# Patient Record
Sex: MALE | Race: WHITE | Employment: FULL TIME | URBAN - METROPOLITAN AREA
[De-identification: names, ages, dates, MRNs, and addresses within clinical notes are randomized per-mention and may not be internally consistent; named-entity substitution may affect disease eponyms.]

---

## 2017-01-06 RX ORDER — AMOXICILLIN 500 MG/1
CAPSULE ORAL
Qty: 30 CAPSULE | Refills: 0 | Status: SHIPPED | OUTPATIENT
Start: 2017-01-06 | End: 2017-12-05

## 2017-01-10 ENCOUNTER — TELEPHONE (OUTPATIENT)
Dept: INTERNAL MEDICINE CLINIC | Age: 52
End: 2017-01-10

## 2017-04-10 ENCOUNTER — TELEPHONE (OUTPATIENT)
Dept: INTERNAL MEDICINE CLINIC | Age: 52
End: 2017-04-10

## 2017-04-23 DIAGNOSIS — F32.0 MILD SINGLE CURRENT EPISODE OF MAJOR DEPRESSIVE DISORDER (HCC): ICD-10-CM

## 2017-07-11 DIAGNOSIS — E78.00 PURE HYPERCHOLESTEROLEMIA: ICD-10-CM

## 2017-07-13 RX ORDER — ATORVASTATIN CALCIUM 20 MG/1
TABLET, FILM COATED ORAL
Qty: 30 TABLET | Refills: 2 | Status: SHIPPED | OUTPATIENT
Start: 2017-07-13 | End: 2017-11-09 | Stop reason: SDUPTHER

## 2017-08-11 RX ORDER — IBUPROFEN 600 MG/1
TABLET ORAL
Qty: 90 TABLET | Refills: 2 | Status: SHIPPED | OUTPATIENT
Start: 2017-08-11 | End: 2017-12-05 | Stop reason: SDUPTHER

## 2017-11-09 DIAGNOSIS — E78.00 PURE HYPERCHOLESTEROLEMIA: ICD-10-CM

## 2017-11-09 RX ORDER — ATORVASTATIN CALCIUM 20 MG/1
TABLET, FILM COATED ORAL
Qty: 30 TABLET | Refills: 1 | Status: SHIPPED | OUTPATIENT
Start: 2017-11-09 | End: 2017-12-05 | Stop reason: SDUPTHER

## 2017-12-05 ENCOUNTER — OFFICE VISIT (OUTPATIENT)
Dept: INTERNAL MEDICINE CLINIC | Age: 52
End: 2017-12-05

## 2017-12-05 VITALS
HEART RATE: 80 BPM | SYSTOLIC BLOOD PRESSURE: 124 MMHG | HEIGHT: 67 IN | BODY MASS INDEX: 33.51 KG/M2 | DIASTOLIC BLOOD PRESSURE: 80 MMHG | TEMPERATURE: 98.1 F | WEIGHT: 213.5 LBS

## 2017-12-05 DIAGNOSIS — F32.0 MILD SINGLE CURRENT EPISODE OF MAJOR DEPRESSIVE DISORDER (HCC): ICD-10-CM

## 2017-12-05 DIAGNOSIS — Z12.11 COLON CANCER SCREENING: ICD-10-CM

## 2017-12-05 DIAGNOSIS — Z00.00 ROUTINE GENERAL MEDICAL EXAMINATION AT A HEALTH CARE FACILITY: Primary | ICD-10-CM

## 2017-12-05 DIAGNOSIS — E78.00 PURE HYPERCHOLESTEROLEMIA: ICD-10-CM

## 2017-12-05 DIAGNOSIS — Z12.5 PROSTATE CANCER SCREENING: ICD-10-CM

## 2017-12-05 DIAGNOSIS — F41.9 ANXIETY: ICD-10-CM

## 2017-12-05 LAB
A/G RATIO: 1.7 (ref 1.1–2.2)
ALBUMIN SERPL-MCNC: 4.7 G/DL (ref 3.4–5)
ALP BLD-CCNC: 112 U/L (ref 40–129)
ALT SERPL-CCNC: 28 U/L (ref 10–40)
ANION GAP SERPL CALCULATED.3IONS-SCNC: 16 MMOL/L (ref 3–16)
AST SERPL-CCNC: 22 U/L (ref 15–37)
BILIRUB SERPL-MCNC: 0.5 MG/DL (ref 0–1)
BILIRUBIN, POC: NORMAL
BLOOD URINE, POC: NORMAL
BUN BLDV-MCNC: 19 MG/DL (ref 7–20)
CALCIUM SERPL-MCNC: 9.7 MG/DL (ref 8.3–10.6)
CHLORIDE BLD-SCNC: 102 MMOL/L (ref 99–110)
CHOLESTEROL, TOTAL: 234 MG/DL (ref 0–199)
CLARITY, POC: CLEAR
CO2: 23 MMOL/L (ref 21–32)
COLOR, POC: NORMAL
CREAT SERPL-MCNC: 0.9 MG/DL (ref 0.9–1.3)
GFR AFRICAN AMERICAN: >60
GFR NON-AFRICAN AMERICAN: >60
GLOBULIN: 2.8 G/DL
GLUCOSE BLD-MCNC: 86 MG/DL (ref 70–99)
GLUCOSE URINE, POC: NORMAL
HCT VFR BLD CALC: 41.4 % (ref 40.5–52.5)
HDLC SERPL-MCNC: 64 MG/DL (ref 40–60)
HEMOGLOBIN: 14.1 G/DL (ref 13.5–17.5)
KETONES, POC: NORMAL
LDL CHOLESTEROL CALCULATED: 123 MG/DL
LEUKOCYTE EST, POC: NORMAL
MCH RBC QN AUTO: 31.7 PG (ref 26–34)
MCHC RBC AUTO-ENTMCNC: 34 G/DL (ref 31–36)
MCV RBC AUTO: 93.2 FL (ref 80–100)
NITRITE, POC: NORMAL
PDW BLD-RTO: 14.2 % (ref 12.4–15.4)
PH, POC: 6
PLATELET # BLD: 272 K/UL (ref 135–450)
PMV BLD AUTO: 7.8 FL (ref 5–10.5)
POTASSIUM SERPL-SCNC: 5.1 MMOL/L (ref 3.5–5.1)
PROSTATE SPECIFIC ANTIGEN: 0.33 NG/ML (ref 0–4)
PROTEIN, POC: NORMAL
RBC # BLD: 4.45 M/UL (ref 4.2–5.9)
SODIUM BLD-SCNC: 141 MMOL/L (ref 136–145)
SPECIFIC GRAVITY, POC: 1.02
TOTAL PROTEIN: 7.5 G/DL (ref 6.4–8.2)
TRIGL SERPL-MCNC: 236 MG/DL (ref 0–150)
TSH SERPL DL<=0.05 MIU/L-ACNC: 0.94 UIU/ML (ref 0.27–4.2)
UROBILINOGEN, POC: 0.2
VLDLC SERPL CALC-MCNC: 47 MG/DL
WBC # BLD: 7.1 K/UL (ref 4–11)

## 2017-12-05 PROCEDURE — 81002 URINALYSIS NONAUTO W/O SCOPE: CPT | Performed by: INTERNAL MEDICINE

## 2017-12-05 PROCEDURE — 99396 PREV VISIT EST AGE 40-64: CPT | Performed by: INTERNAL MEDICINE

## 2017-12-05 PROCEDURE — 36415 COLL VENOUS BLD VENIPUNCTURE: CPT | Performed by: INTERNAL MEDICINE

## 2017-12-05 RX ORDER — ALPRAZOLAM 0.5 MG/1
0.5 TABLET ORAL NIGHTLY PRN
Qty: 28 TABLET | Refills: 2 | Status: SHIPPED | OUTPATIENT
Start: 2017-12-05 | End: 2018-03-06 | Stop reason: SDUPTHER

## 2017-12-05 RX ORDER — OMEPRAZOLE 40 MG/1
40 CAPSULE, DELAYED RELEASE ORAL
Qty: 90 CAPSULE | Refills: 3 | Status: SHIPPED | OUTPATIENT
Start: 2017-12-05 | End: 2018-12-07

## 2017-12-05 RX ORDER — SERTRALINE HYDROCHLORIDE 100 MG/1
100 TABLET, FILM COATED ORAL DAILY
Qty: 90 TABLET | Refills: 3 | Status: SHIPPED | OUTPATIENT
Start: 2017-12-05 | End: 2018-03-06

## 2017-12-05 RX ORDER — ATORVASTATIN CALCIUM 20 MG/1
20 TABLET, FILM COATED ORAL DAILY
Qty: 90 TABLET | Refills: 3 | Status: SHIPPED | OUTPATIENT
Start: 2017-12-05 | End: 2018-08-31 | Stop reason: SDUPTHER

## 2017-12-05 RX ORDER — IBUPROFEN 600 MG/1
600 TABLET ORAL EVERY 8 HOURS PRN
Qty: 180 TABLET | Refills: 3 | Status: SHIPPED | OUTPATIENT
Start: 2017-12-05 | End: 2018-12-31 | Stop reason: SDUPTHER

## 2017-12-05 ASSESSMENT — PATIENT HEALTH QUESTIONNAIRE - PHQ9
SUM OF ALL RESPONSES TO PHQ QUESTIONS 1-9: 0
1. LITTLE INTEREST OR PLEASURE IN DOING THINGS: 0
SUM OF ALL RESPONSES TO PHQ9 QUESTIONS 1 & 2: 0
2. FEELING DOWN, DEPRESSED OR HOPELESS: 0

## 2017-12-05 ASSESSMENT — ENCOUNTER SYMPTOMS
ABDOMINAL PAIN: 1
RESPIRATORY NEGATIVE: 1
SHORTNESS OF BREATH: 0
CONSTIPATION: 0

## 2017-12-05 NOTE — PROGRESS NOTES
episode of major depressive disorder (HCC)    Anxiety  -     ALPRAZolam (XANAX) 0.5 MG tablet; Take 1 tablet by mouth nightly as needed for Sleep . Prostate cancer screening  -     POCT Urinalysis no Micro  -     Psa screening    Colon cancer screening  -     POCT Fecal Immunochemical Test (FIT); Future    Other orders  -     ibuprofen (ADVIL;MOTRIN) 600 MG tablet; Take 1 tablet by mouth every 8 hours as needed for Pain  -     omeprazole (PRILOSEC) 40 MG delayed release capsule; Take 1 capsule by mouth daily (with breakfast)  -     sertraline (ZOLOFT) 100 MG tablet; Take 1 tablet by mouth daily    Controlled Substances Monitoring:     Attestation: The Prescription Monitoring Report for this patient was reviewed today. Ann-Marie Oscar MD)  Documentation: No signs of potential drug abuse or diversion identified.  Ann-Marie Oscar MD)

## 2017-12-18 ENCOUNTER — TELEPHONE (OUTPATIENT)
Dept: INTERNAL MEDICINE CLINIC | Age: 52
End: 2017-12-18

## 2017-12-18 RX ORDER — AMOXICILLIN 250 MG/1
250 CAPSULE ORAL 3 TIMES DAILY
Qty: 30 CAPSULE | Refills: 0 | Status: SHIPPED | OUTPATIENT
Start: 2017-12-18 | End: 2017-12-28

## 2018-03-06 ENCOUNTER — OFFICE VISIT (OUTPATIENT)
Dept: INTERNAL MEDICINE CLINIC | Age: 53
End: 2018-03-06

## 2018-03-06 VITALS
WEIGHT: 219.5 LBS | HEART RATE: 73 BPM | SYSTOLIC BLOOD PRESSURE: 136 MMHG | OXYGEN SATURATION: 97 % | DIASTOLIC BLOOD PRESSURE: 82 MMHG | TEMPERATURE: 98.7 F | BODY MASS INDEX: 34.38 KG/M2

## 2018-03-06 DIAGNOSIS — Z72.0 TOBACCO ABUSE: ICD-10-CM

## 2018-03-06 DIAGNOSIS — M15.3 OTHER SECONDARY OSTEOARTHRITIS OF MULTIPLE SITES: ICD-10-CM

## 2018-03-06 DIAGNOSIS — F41.9 ANXIETY: ICD-10-CM

## 2018-03-06 DIAGNOSIS — F32.0 MILD SINGLE CURRENT EPISODE OF MAJOR DEPRESSIVE DISORDER (HCC): Primary | ICD-10-CM

## 2018-03-06 DIAGNOSIS — E78.00 PURE HYPERCHOLESTEROLEMIA: ICD-10-CM

## 2018-03-06 PROCEDURE — 99214 OFFICE O/P EST MOD 30 MIN: CPT | Performed by: INTERNAL MEDICINE

## 2018-03-06 RX ORDER — ALPRAZOLAM 0.5 MG/1
0.5 TABLET ORAL NIGHTLY PRN
Qty: 28 TABLET | Refills: 2 | Status: SHIPPED | OUTPATIENT
Start: 2018-03-06 | End: 2018-08-31 | Stop reason: SDUPTHER

## 2018-03-06 RX ORDER — BUPROPION HYDROCHLORIDE 100 MG/1
100 TABLET, EXTENDED RELEASE ORAL 2 TIMES DAILY
Qty: 180 TABLET | Refills: 3 | Status: SHIPPED | OUTPATIENT
Start: 2018-03-06 | End: 2018-08-31 | Stop reason: SDUPTHER

## 2018-03-06 ASSESSMENT — ENCOUNTER SYMPTOMS: RESPIRATORY NEGATIVE: 1

## 2018-03-06 NOTE — PROGRESS NOTES
Negative. Cardiovascular: Negative. Gastrointestinal:        Sx of IBS. Genitourinary: Negative. Musculoskeletal: Positive for arthralgias. Both knees. Skin: Negative. Neurological: Negative. Psychiatric/Behavioral: Positive for dysphoric mood and sleep disturbance. The patient is nervous/anxious. Objective:   Physical Exam   Constitutional: He appears well-developed and well-nourished. HENT:   Mouth/Throat: No oropharyngeal exudate. Eyes: Conjunctivae and EOM are normal. Pupils are equal, round, and reactive to light. No scleral icterus. Neck: Normal range of motion. No thyromegaly present. Cardiovascular: Normal rate, regular rhythm and intact distal pulses. Pulmonary/Chest: Effort normal. He has no wheezes. He exhibits no tenderness. Abdominal: Soft. He exhibits no mass. There is no tenderness. Musculoskeletal: Normal range of motion. He exhibits no edema. Lymphadenopathy:     He has no cervical adenopathy. Neurological: He is alert. No cranial nerve deficit. Skin: Skin is warm. Psychiatric: His behavior is normal.       Assessment:      Encounter Diagnoses   Name Primary?  Anxiety     Mild single current episode of major depressive disorder ([address] Yes    Other secondary osteoarthritis of multiple sites     Tobacco abuse     Pure hypercholesterolemia            Plan:      Holly Barragan was seen today for medication refill. Diagnoses and all orders for this visit:    Mild single current episode of major depressive disorder ([address]    Anxiety    Other secondary osteoarthritis of multiple sites    Tobacco abuse    Pure hypercholesterolemia            Controlled Substances Monitoring: The Prescription Monitoring Report for this patient was reviewed today. Dominic Conti MD)    No signs of potential drug abuse or diversion identified. , Possible medication side effects, risk of tolerance/dependence & alternative treatments discussed.  Dominic Conti MD)

## 2018-04-20 ENCOUNTER — OFFICE VISIT (OUTPATIENT)
Dept: INTERNAL MEDICINE CLINIC | Age: 53
End: 2018-04-20

## 2018-04-20 VITALS
OXYGEN SATURATION: 98 % | WEIGHT: 215 LBS | DIASTOLIC BLOOD PRESSURE: 94 MMHG | TEMPERATURE: 98.7 F | SYSTOLIC BLOOD PRESSURE: 156 MMHG | BODY MASS INDEX: 33.67 KG/M2 | HEART RATE: 80 BPM

## 2018-04-20 DIAGNOSIS — K04.7 ABSCESSED TOOTH: Primary | ICD-10-CM

## 2018-04-20 PROCEDURE — 99214 OFFICE O/P EST MOD 30 MIN: CPT | Performed by: INTERNAL MEDICINE

## 2018-04-20 RX ORDER — HYDROCODONE BITARTRATE AND ACETAMINOPHEN 5; 325 MG/1; MG/1
1 TABLET ORAL EVERY 8 HOURS PRN
Qty: 10 TABLET | Refills: 0 | Status: SHIPPED | OUTPATIENT
Start: 2018-04-20 | End: 2018-04-25

## 2018-04-20 RX ORDER — AMOXICILLIN 500 MG/1
500 CAPSULE ORAL 3 TIMES DAILY
Qty: 30 CAPSULE | Refills: 0 | Status: SHIPPED | OUTPATIENT
Start: 2018-04-20 | End: 2018-04-30

## 2018-04-20 ASSESSMENT — ENCOUNTER SYMPTOMS
RESPIRATORY NEGATIVE: 1
GASTROINTESTINAL NEGATIVE: 1

## 2018-04-23 ENCOUNTER — TELEPHONE (OUTPATIENT)
Dept: INTERNAL MEDICINE CLINIC | Age: 53
End: 2018-04-23

## 2018-05-02 ENCOUNTER — TELEPHONE (OUTPATIENT)
Dept: INTERNAL MEDICINE CLINIC | Age: 53
End: 2018-05-02

## 2018-08-31 ENCOUNTER — HOSPITAL ENCOUNTER (OUTPATIENT)
Dept: NON INVASIVE DIAGNOSTICS | Age: 53
Discharge: HOME | End: 2018-08-31
Attending: INTERNAL MEDICINE | Admitting: INTERNAL MEDICINE

## 2018-08-31 ENCOUNTER — OFFICE VISIT (OUTPATIENT)
Dept: INTERNAL MEDICINE CLINIC | Age: 53
End: 2018-08-31

## 2018-08-31 VITALS
BODY MASS INDEX: 32.79 KG/M2 | TEMPERATURE: 98.4 F | WEIGHT: 209.38 LBS | DIASTOLIC BLOOD PRESSURE: 82 MMHG | SYSTOLIC BLOOD PRESSURE: 134 MMHG | HEART RATE: 86 BPM | OXYGEN SATURATION: 97 %

## 2018-08-31 DIAGNOSIS — G89.29 CHRONIC PAIN OF RIGHT ANKLE: Primary | ICD-10-CM

## 2018-08-31 DIAGNOSIS — M25.571 CHRONIC PAIN OF RIGHT ANKLE: Primary | ICD-10-CM

## 2018-08-31 DIAGNOSIS — M25.561 CHRONIC PAIN OF RIGHT KNEE: ICD-10-CM

## 2018-08-31 DIAGNOSIS — M25.571 CHRONIC PAIN OF RIGHT ANKLE: ICD-10-CM

## 2018-08-31 DIAGNOSIS — E78.00 PURE HYPERCHOLESTEROLEMIA: ICD-10-CM

## 2018-08-31 DIAGNOSIS — F41.9 ANXIETY: ICD-10-CM

## 2018-08-31 DIAGNOSIS — G89.29 CHRONIC PAIN OF RIGHT ANKLE: ICD-10-CM

## 2018-08-31 DIAGNOSIS — G89.29 CHRONIC PAIN OF RIGHT KNEE: ICD-10-CM

## 2018-08-31 PROCEDURE — 99214 OFFICE O/P EST MOD 30 MIN: CPT | Performed by: INTERNAL MEDICINE

## 2018-08-31 RX ORDER — BUPROPION HYDROCHLORIDE 100 MG/1
100 TABLET, EXTENDED RELEASE ORAL 2 TIMES DAILY
Qty: 180 TABLET | Refills: 3 | Status: SHIPPED | OUTPATIENT
Start: 2018-08-31 | End: 2018-12-07

## 2018-08-31 RX ORDER — ATORVASTATIN CALCIUM 20 MG/1
20 TABLET, FILM COATED ORAL DAILY
Qty: 90 TABLET | Refills: 3 | Status: SHIPPED | OUTPATIENT
Start: 2018-08-31 | End: 2019-12-30

## 2018-08-31 RX ORDER — ALPRAZOLAM 0.5 MG/1
0.5 TABLET ORAL NIGHTLY PRN
Qty: 28 TABLET | Refills: 2 | Status: SHIPPED | OUTPATIENT
Start: 2018-08-31 | End: 2018-12-07 | Stop reason: SDUPTHER

## 2018-08-31 ASSESSMENT — ENCOUNTER SYMPTOMS
CHOKING: 0
RESPIRATORY NEGATIVE: 1
COLOR CHANGE: 0
APNEA: 0
CONSTIPATION: 0
EYES NEGATIVE: 1
SINUS PRESSURE: 0
STRIDOR: 0
BACK PAIN: 0

## 2018-08-31 NOTE — LETTER
[address] [facility]  [address] [address]  Phone: [phone]  Fax: [phone]    Rahat Moncada MD        August 31, 2018     Patient: Corina Cassidy   YOB: 1965   Date of Visit: 8/31/2018       To Whom It May Concern: It is my medical opinion that Leona Mora requires a disability parking placard for the following reasons:  He cannot walk 200 feet without stopping to rest.  Duration of need: 1 year    If you have any questions or concerns, please don't hesitate to call.     Sincerely,          Rahat Moncada MD

## 2018-08-31 NOTE — PROGRESS NOTES
Subjective:      Patient ID: Dru Gerard is a 45 y.o. male. Patient presents with: Foot Pain: right foot pain. has to get note to be off work until 9-17, he needs to heal his foot before his new job interview. Skin Lesion: wants a growth removed off right hand. Letter for School/Work: needs new letter for handicapped placard. Medication Refill: xanax  Dru Gerard is a 45 y.o. male with the following history as recorded in [facility]: There are no active problems to display for this patient. Current Outpatient Prescriptions:  buPROPion (WELLBUTRIN SR) 100 MG extended release tablet, Take 1 tablet by mouth 2 times daily, Disp: 180 tablet, Rfl: 3  atorvastatin (LIPITOR) 20 MG tablet, Take 1 tablet by mouth daily, Disp: 90 tablet, Rfl: 3  ALPRAZolam (XANAX) 0.5 MG tablet, Take 1 tablet by mouth nightly as needed for Sleep for up to 30 days. ., Disp: 28 tablet, Rfl: 2  ibuprofen (ADVIL;MOTRIN) 600 MG tablet, Take 1 tablet by mouth every 8 hours as needed for Pain, Disp: 180 tablet, Rfl: 3  omeprazole (PRILOSEC) 40 MG delayed release capsule, Take 1 capsule by mouth daily (with breakfast), Disp: 90 capsule, Rfl: 3  Misc Natural Products (GLUCOS-CHONDROIT-MSM COMPLEX PO), Take by mouth daily, Disp: , Rfl:   aspirin 325 MG EC tablet, Take 325 mg by mouth every other day , Disp: , Rfl:     No current facility-administered medications for this visit. Allergies: Patient has no known allergies. Past Medical History:  2003: Torn ACL      Comment: left, right  No date:  Torn meniscus      Comment: left, right  Past Surgical History:  No date: DENTAL SURGERY      Comment: tooth extraction  No date: KNEE ARTHROSCOPY Left  Review of patient's family history indicates:  Problem: Heart Disease      Relation: Mother       Age of Onset: (Not Specified)     Smoking status: Former Smoker                                                              Packs/day: 0.50      Years: 30.00        Quit date: 11/5/2017  Smokeless and deformity. Feet:    Lymphadenopathy:     He has no cervical adenopathy. Neurological: He is alert. No cranial nerve deficit. Skin: Skin is warm. Psychiatric: His behavior is normal.       Assessment:      Encounter Diagnoses   Name Primary?  Chronic pain of right ankle Yes    Pure hypercholesterolemia     Anxiety     Chronic pain of right knee            Plan:      Rober Bautista was seen today for foot pain, skin lesion, letter for school/work and medication refill. Diagnoses and all orders for this visit:    Chronic pain of right ankle  -     XR ANKLE LEFT (2 VIEWS)  -     External Referral To Orthopedic Surgery    Pure hypercholesterolemia  -     atorvastatin (LIPITOR) 20 MG tablet; Take 1 tablet by mouth daily    Anxiety  -     ALPRAZolam (XANAX) 0.5 MG tablet; Take 1 tablet by mouth nightly as needed for Sleep for up to 30 days. .    Chronic pain of right knee    Other orders  -     buPROPion (WELLBUTRIN SR) 100 MG extended release tablet; Take 1 tablet by mouth 2 times daily      Controlled Substances Monitoring:     RX Monitoring 8/31/2018   Attestation The Prescription Monitoring Report for this patient was reviewed today. Documentation Possible medication side effects, risk of tolerance/dependence & alternative treatments discussed. ;No signs of potential drug abuse or diversion identified. Rt ankle had injury many years ago. Put weight on rt ankle because of deformed lt knee. Referred to orthopedics for rt ankle pain. He may come for wart remove on his hand.    Ana Paula Harding MD

## 2018-12-07 ENCOUNTER — OFFICE VISIT (OUTPATIENT)
Dept: INTERNAL MEDICINE CLINIC | Age: 53
End: 2018-12-07
Payer: COMMERCIAL

## 2018-12-07 VITALS
SYSTOLIC BLOOD PRESSURE: 120 MMHG | OXYGEN SATURATION: 94 % | BODY MASS INDEX: 33.74 KG/M2 | DIASTOLIC BLOOD PRESSURE: 76 MMHG | HEIGHT: 67 IN | WEIGHT: 215 LBS | HEART RATE: 63 BPM | TEMPERATURE: 98.3 F

## 2018-12-07 DIAGNOSIS — E78.00 PURE HYPERCHOLESTEROLEMIA: ICD-10-CM

## 2018-12-07 DIAGNOSIS — Z00.00 ROUTINE GENERAL MEDICAL EXAMINATION AT A HEALTH CARE FACILITY: Primary | ICD-10-CM

## 2018-12-07 DIAGNOSIS — F41.9 ANXIETY: ICD-10-CM

## 2018-12-07 LAB
BILIRUBIN, POC: NORMAL
BLOOD URINE, POC: NORMAL
CLARITY, POC: CLEAR
COLOR, POC: NORMAL
GLUCOSE URINE, POC: NORMAL
KETONES, POC: NORMAL
LEUKOCYTE EST, POC: NORMAL
NITRITE, POC: NORMAL
PH, POC: 6
PROTEIN, POC: NORMAL
SPECIFIC GRAVITY, POC: 1.01
UROBILINOGEN, POC: 0.2

## 2018-12-07 PROCEDURE — 81002 URINALYSIS NONAUTO W/O SCOPE: CPT | Performed by: INTERNAL MEDICINE

## 2018-12-07 PROCEDURE — 36415 COLL VENOUS BLD VENIPUNCTURE: CPT | Performed by: INTERNAL MEDICINE

## 2018-12-07 PROCEDURE — 99396 PREV VISIT EST AGE 40-64: CPT | Performed by: INTERNAL MEDICINE

## 2018-12-07 RX ORDER — ALPRAZOLAM 0.5 MG/1
0.5 TABLET ORAL NIGHTLY PRN
Qty: 28 TABLET | Refills: 2 | Status: SHIPPED | OUTPATIENT
Start: 2018-12-07 | End: 2021-02-22 | Stop reason: SDUPTHER

## 2018-12-07 RX ORDER — BUPROPION HYDROCHLORIDE 150 MG/1
150 TABLET, EXTENDED RELEASE ORAL 2 TIMES DAILY
Qty: 180 TABLET | Refills: 3 | Status: SHIPPED | OUTPATIENT
Start: 2018-12-07 | End: 2021-02-22 | Stop reason: SDUPTHER

## 2018-12-07 ASSESSMENT — ENCOUNTER SYMPTOMS
EYES NEGATIVE: 1
SHORTNESS OF BREATH: 0
RESPIRATORY NEGATIVE: 1

## 2018-12-07 NOTE — PROGRESS NOTES
Comment: down to 4-5 cigs per day  Alcohol use: Yes              Comment: social drink. Hyperlipidemia   This is a chronic problem. The current episode started more than 1 year ago. The problem is uncontrolled. Recent lipid tests were reviewed and are variable. There are no known factors aggravating his hyperlipidemia. Pertinent negatives include no chest pain, leg pain, myalgias or shortness of breath. Current antihyperlipidemic treatment includes statins. The current treatment provides significant improvement of lipids. Compliance problems: missed med often. Risk factors for coronary artery disease include dyslipidemia, male sex and stress. Review of Systems   Constitutional: Negative for fatigue. HENT: Negative. Eyes: Negative. Respiratory: Negative. Negative for shortness of breath. Tobbaco abuse. Cardiovascular: Negative. Negative for chest pain. Genitourinary: Negative. Musculoskeletal: Positive for arthralgias. Negative for myalgias. Knees and ankles. Skin: Negative. Neurological: Positive for headaches. Psychiatric/Behavioral: Positive for dysphoric mood. The patient is nervous/anxious. Objective:   Physical Exam   Constitutional: He appears well-developed and well-nourished. HENT:   Head: Normocephalic and atraumatic. Right Ear: External ear normal.   Left Ear: External ear normal.   Nose: Nose normal.   Mouth/Throat: Oropharynx is clear and moist.   Eyes: Conjunctivae are normal. Right eye exhibits no discharge. Left eye exhibits no discharge. No scleral icterus. Neck: Normal range of motion. Neck supple. No JVD present. No tracheal deviation present. No thyromegaly present. Cardiovascular: Normal rate, regular rhythm, normal heart sounds and intact distal pulses. Exam reveals no gallop and no friction rub. No murmur heard. Pulmonary/Chest: Effort normal and breath sounds normal. No stridor. No respiratory distress.  He has no

## 2018-12-08 LAB
A/G RATIO: 1.8 (ref 1.1–2.2)
ALBUMIN SERPL-MCNC: 4.8 G/DL (ref 3.4–5)
ALP BLD-CCNC: 118 U/L (ref 40–129)
ALT SERPL-CCNC: 34 U/L (ref 10–40)
AMPHETAMINE SCREEN, URINE: NORMAL
ANION GAP SERPL CALCULATED.3IONS-SCNC: 15 MMOL/L (ref 3–16)
AST SERPL-CCNC: 21 U/L (ref 15–37)
BARBITURATE SCREEN URINE: NORMAL
BENZODIAZEPINE SCREEN, URINE: NORMAL
BILIRUB SERPL-MCNC: 0.6 MG/DL (ref 0–1)
BUN BLDV-MCNC: 13 MG/DL (ref 7–20)
CALCIUM SERPL-MCNC: 10 MG/DL (ref 8.3–10.6)
CANNABINOID SCREEN URINE: NORMAL
CHLORIDE BLD-SCNC: 103 MMOL/L (ref 99–110)
CHOLESTEROL, TOTAL: 213 MG/DL (ref 0–199)
CO2: 27 MMOL/L (ref 21–32)
COCAINE METABOLITE SCREEN URINE: NORMAL
CREAT SERPL-MCNC: 0.8 MG/DL (ref 0.9–1.3)
GFR AFRICAN AMERICAN: >60
GFR NON-AFRICAN AMERICAN: >60
GLOBULIN: 2.6 G/DL
GLUCOSE BLD-MCNC: 79 MG/DL (ref 70–99)
HCT VFR BLD CALC: 44 % (ref 40.5–52.5)
HDLC SERPL-MCNC: 61 MG/DL (ref 40–60)
HEMOGLOBIN: 14.6 G/DL (ref 13.5–17.5)
LDL CHOLESTEROL CALCULATED: 102 MG/DL
Lab: NORMAL
MCH RBC QN AUTO: 31.3 PG (ref 26–34)
MCHC RBC AUTO-ENTMCNC: 33.2 G/DL (ref 31–36)
MCV RBC AUTO: 94.3 FL (ref 80–100)
METHADONE SCREEN, URINE: NORMAL
OPIATE SCREEN URINE: NORMAL
OXYCODONE URINE: NORMAL
PDW BLD-RTO: 14.9 % (ref 12.4–15.4)
PH UA: 6
PHENCYCLIDINE SCREEN URINE: NORMAL
PLATELET # BLD: 246 K/UL (ref 135–450)
PMV BLD AUTO: 8.3 FL (ref 5–10.5)
POTASSIUM SERPL-SCNC: 4.3 MMOL/L (ref 3.5–5.1)
PROPOXYPHENE SCREEN: NORMAL
RBC # BLD: 4.67 M/UL (ref 4.2–5.9)
SODIUM BLD-SCNC: 145 MMOL/L (ref 136–145)
TOTAL PROTEIN: 7.4 G/DL (ref 6.4–8.2)
TRIGL SERPL-MCNC: 252 MG/DL (ref 0–150)
TSH SERPL DL<=0.05 MIU/L-ACNC: 0.89 UIU/ML (ref 0.27–4.2)
VLDLC SERPL CALC-MCNC: 50 MG/DL
WBC # BLD: 6.9 K/UL (ref 4–11)

## 2019-01-03 RX ORDER — IBUPROFEN 600 MG/1
TABLET ORAL
Qty: 90 TABLET | Refills: 2 | Status: SHIPPED | OUTPATIENT
Start: 2019-01-03 | End: 2021-02-22

## 2019-09-16 ENCOUNTER — HOSPITAL ENCOUNTER (EMERGENCY)
Age: 54
Discharge: HOME | End: 2019-09-16
Attending: EMERGENCY MEDICINE
Payer: COMMERCIAL

## 2019-09-16 ENCOUNTER — APPOINTMENT (OUTPATIENT)
Dept: GENERAL RADIOLOGY | Age: 54
End: 2019-09-16
Payer: COMMERCIAL

## 2019-09-16 VITALS
WEIGHT: 208 LBS | BODY MASS INDEX: 32.65 KG/M2 | HEART RATE: 66 BPM | SYSTOLIC BLOOD PRESSURE: 173 MMHG | TEMPERATURE: 98 F | HEIGHT: 67 IN | RESPIRATION RATE: 16 BRPM | OXYGEN SATURATION: 98 % | DIASTOLIC BLOOD PRESSURE: 105 MMHG

## 2019-09-16 DIAGNOSIS — S60.221A CONTUSION OF RIGHT HAND, INITIAL ENCOUNTER: Primary | ICD-10-CM

## 2019-09-16 PROCEDURE — 73130 X-RAY EXAM OF HAND: CPT

## 2019-09-16 PROCEDURE — 99283 EMERGENCY DEPT VISIT LOW MDM: CPT

## 2019-09-16 ASSESSMENT — PAIN DESCRIPTION - FREQUENCY: FREQUENCY: CONTINUOUS

## 2019-09-16 ASSESSMENT — PAIN DESCRIPTION - DESCRIPTORS: DESCRIPTORS: SORE

## 2019-09-16 ASSESSMENT — PAIN DESCRIPTION - ORIENTATION: ORIENTATION: RIGHT

## 2019-09-16 ASSESSMENT — PAIN SCALES - GENERAL
PAINLEVEL_OUTOF10: 8
PAINLEVEL_OUTOF10: 8

## 2019-09-16 ASSESSMENT — PAIN - FUNCTIONAL ASSESSMENT: PAIN_FUNCTIONAL_ASSESSMENT: PREVENTS OR INTERFERES SOME ACTIVE ACTIVITIES AND ADLS

## 2019-09-16 ASSESSMENT — PAIN DESCRIPTION - PAIN TYPE: TYPE: ACUTE PAIN

## 2019-09-16 ASSESSMENT — PAIN DESCRIPTION - PROGRESSION: CLINICAL_PROGRESSION: NOT CHANGED

## 2019-09-16 ASSESSMENT — PAIN DESCRIPTION - LOCATION: LOCATION: HAND

## 2019-09-16 NOTE — ED PROVIDER NOTES
Recommend conservative therapy and close clinical follow-up. LABS:  Labs Reviewed - No data to display    All other labs were within normal range or not returned as of this dictation. EMERGENCY DEPARTMENT COURSE and DIFFERENTIAL DIAGNOSIS/MDM:   Vitals:    Vitals:    09/16/19 1949   BP: (!) 173/105   Pulse: 66   Resp: 16   Temp: 98 °F (36.7 °C)   TempSrc: Oral   SpO2: 98%   Weight: 208 lb (94.3 kg)   Height: 5' 7\" (1.702 m)       Patient hit his car door with his hand. He has extensive swelling on the dorsum of his hand. His tenderness is mainly in the area of the distal second third and fourth metacarpals. The x-ray showed some lucencies in the area of the distal fifth metacarpal which were questionable for fracture. I reexamined the patient. I discussed with him the x-ray findings. He says he has no pain at all in that area. He has no tenderness in that area on exam.  I did explain to him that the radiologist felt there was a questionable fracture but in light of the fact that he had no pain or tenderness there were going to manage him with an Ace wrap, ice and elevate. I gave him an orthopedic referral for follow-up for persistent pain. He will use ibuprofen and Tylenol as needed for pain. The x-ray results, treatment plan, and follow-up were discussed with the patient. He understands a follow-up and treatment plan. His blood pressure was elevated. He understands he needs to follow-up to have his blood pressure recheck. PROCEDURES:  None    FINAL IMPRESSION      1.  Contusion of right hand, initial encounter          DISPOSITION/PLAN   DISPOSITION Decision To Discharge 09/16/2019 08:16:36 PM      PATIENT REFERRED TO:  Ariel Albarado MD  [address] [facility]  [address] [place] [address]  [phone]            DISCHARGE MEDICATIONS:  New Prescriptions    No medications on file       (Please note that portions of this note were completed with a voice recognition program. Efforts were made to edit the dictations but occasionally words are mis-transcribed.)    Jolie Young MD  Attending Emergency Physician        Dagmar Porter MD  09/16/19 2019

## 2019-12-29 DIAGNOSIS — E78.00 PURE HYPERCHOLESTEROLEMIA: ICD-10-CM

## 2019-12-30 RX ORDER — ATORVASTATIN CALCIUM 20 MG/1
TABLET, FILM COATED ORAL
Qty: 90 TABLET | Refills: 2 | Status: SHIPPED | OUTPATIENT
Start: 2019-12-30 | End: 2020-11-10

## 2020-11-10 RX ORDER — ATORVASTATIN CALCIUM 20 MG/1
TABLET, FILM COATED ORAL
Qty: 30 TABLET | Refills: 1 | Status: SHIPPED | OUTPATIENT
Start: 2020-11-10 | End: 2021-01-18

## 2021-01-17 DIAGNOSIS — E78.00 PURE HYPERCHOLESTEROLEMIA: ICD-10-CM

## 2021-01-18 RX ORDER — ATORVASTATIN CALCIUM 20 MG/1
TABLET, FILM COATED ORAL
Qty: 30 TABLET | Refills: 0 | Status: SHIPPED | OUTPATIENT
Start: 2021-01-18 | End: 2021-03-05

## 2021-02-22 ENCOUNTER — OFFICE VISIT (OUTPATIENT)
Dept: FAMILY MEDICINE CLINIC | Age: 56
End: 2021-02-22
Payer: COMMERCIAL

## 2021-02-22 VITALS — HEART RATE: 72 BPM | OXYGEN SATURATION: 98 % | WEIGHT: 210 LBS | BODY MASS INDEX: 32.89 KG/M2 | TEMPERATURE: 98 F

## 2021-02-22 DIAGNOSIS — F41.9 ANXIETY: ICD-10-CM

## 2021-02-22 DIAGNOSIS — L98.9 SKIN LESION: Primary | ICD-10-CM

## 2021-02-22 DIAGNOSIS — E78.00 PURE HYPERCHOLESTEROLEMIA: ICD-10-CM

## 2021-02-22 PROCEDURE — G8417 CALC BMI ABV UP PARAM F/U: HCPCS | Performed by: INTERNAL MEDICINE

## 2021-02-22 PROCEDURE — 99213 OFFICE O/P EST LOW 20 MIN: CPT | Performed by: INTERNAL MEDICINE

## 2021-02-22 PROCEDURE — 3017F COLORECTAL CA SCREEN DOC REV: CPT | Performed by: INTERNAL MEDICINE

## 2021-02-22 PROCEDURE — 4004F PT TOBACCO SCREEN RCVD TLK: CPT | Performed by: INTERNAL MEDICINE

## 2021-02-22 PROCEDURE — G8427 DOCREV CUR MEDS BY ELIG CLIN: HCPCS | Performed by: INTERNAL MEDICINE

## 2021-02-22 PROCEDURE — G8484 FLU IMMUNIZE NO ADMIN: HCPCS | Performed by: INTERNAL MEDICINE

## 2021-02-22 RX ORDER — ALPRAZOLAM 0.5 MG/1
0.5 TABLET ORAL NIGHTLY PRN
Qty: 28 TABLET | Refills: 2 | Status: SHIPPED | OUTPATIENT
Start: 2021-02-22 | End: 2021-03-24

## 2021-02-22 RX ORDER — BUPROPION HYDROCHLORIDE 150 MG/1
150 TABLET, EXTENDED RELEASE ORAL 2 TIMES DAILY
Qty: 180 TABLET | Refills: 3 | Status: SHIPPED | OUTPATIENT
Start: 2021-02-22 | End: 2022-04-05 | Stop reason: ALTCHOICE

## 2021-02-22 ASSESSMENT — ENCOUNTER SYMPTOMS
GASTROINTESTINAL NEGATIVE: 1
RESPIRATORY NEGATIVE: 1

## 2021-02-22 NOTE — PROGRESS NOTES
Negative. Respiratory: Negative. Cardiovascular: Negative. Gastrointestinal: Negative. Musculoskeletal: Positive for arthralgias. Knees. Skin:        Moles on his back. Neurological: Negative for dizziness. Psychiatric/Behavioral: The patient is nervous/anxious. Objective:   Physical Exam  Constitutional:       Appearance: He is well-developed. HENT:      Head: Normocephalic and atraumatic. Right Ear: External ear normal.      Left Ear: External ear normal.      Nose: Nose normal.   Eyes:      General: No scleral icterus. Right eye: No discharge. Left eye: No discharge. Conjunctiva/sclera: Conjunctivae normal.   Neck:      Musculoskeletal: Normal range of motion and neck supple. Thyroid: No thyromegaly. Vascular: No JVD. Trachea: No tracheal deviation. Cardiovascular:      Rate and Rhythm: Normal rate and regular rhythm. Heart sounds: Normal heart sounds. No murmur. No friction rub. No gallop. Pulmonary:      Effort: Pulmonary effort is normal. No respiratory distress. Breath sounds: Normal breath sounds. No stridor. No wheezing or rales. Chest:      Chest wall: No tenderness. Abdominal:      General: Bowel sounds are normal. There is no distension. Palpations: Abdomen is soft. There is no mass. Tenderness: There is no abdominal tenderness. There is no guarding or rebound. Genitourinary:     Penis: Normal. No tenderness. Prostate: Normal.      Rectum: Normal. Guaiac result negative. Musculoskeletal:         General: No tenderness. Lymphadenopathy:      Cervical: No cervical adenopathy. Skin:     General: Skin is warm and dry. Coloration: Skin is not pale. Findings: No erythema or rash. Neurological:      Cranial Nerves: No cranial nerve deficit. Motor: No abnormal muscle tone. Coordination: Coordination normal.      Deep Tendon Reflexes: Reflexes are normal and symmetric. Reflexes normal.   Psychiatric:         Behavior: Behavior normal.         Thought Content: Thought content normal.         Judgment: Judgment normal.         Assessment:      Encounter Diagnoses   Name Primary?  Skin lesion Yes    Anxiety     Pure hypercholesterolemia            Plan:      Loli Blue was seen today for medication refill. Diagnoses and all orders for this visit:    Skin lesion  -     External Referral To Dermatology    Anxiety  -     ALPRAZolam (XANAX) 0.5 MG tablet; Take 1 tablet by mouth nightly as needed for Sleep for up to 30 days. Pure hypercholesterolemia    Other orders  -     buPROPion (WELLBUTRIN SR) 150 MG extended release tablet;  Take 1 tablet by mouth 2 times daily              Terrell Diaz MD

## 2021-02-22 NOTE — PATIENT INSTRUCTIONS
Please call your pharmacy if you need any refills of your medication(s). Please call our office at [phone] if you don't hear from us about your test results. Bring an accurate list of your medications with you at every appointment to ensure that we have the correct information.     Our office hours are: Monday - Friday 8:30 am- 5 pm    Phone lines turn on at 8:30 am

## 2021-04-01 ENCOUNTER — OFFICE VISIT (OUTPATIENT)
Dept: FAMILY MEDICINE CLINIC | Age: 56
End: 2021-04-01
Payer: COMMERCIAL

## 2021-04-01 ENCOUNTER — TELEPHONE (OUTPATIENT)
Dept: FAMILY MEDICINE CLINIC | Age: 56
End: 2021-04-01

## 2021-04-01 VITALS
WEIGHT: 201.4 LBS | SYSTOLIC BLOOD PRESSURE: 158 MMHG | OXYGEN SATURATION: 99 % | DIASTOLIC BLOOD PRESSURE: 80 MMHG | HEIGHT: 67 IN | TEMPERATURE: 98.5 F | BODY MASS INDEX: 31.61 KG/M2 | HEART RATE: 66 BPM

## 2021-04-01 DIAGNOSIS — K92.1 MELENA: Primary | ICD-10-CM

## 2021-04-01 DIAGNOSIS — K62.5 BRIGHT RED RECTAL BLEEDING: Primary | ICD-10-CM

## 2021-04-01 DIAGNOSIS — K92.1 MELENA: ICD-10-CM

## 2021-04-01 LAB
A/G RATIO: 1.5 (ref 1.1–2.2)
ALBUMIN SERPL-MCNC: 4.4 G/DL (ref 3.4–5)
ALP BLD-CCNC: 120 U/L (ref 40–129)
ALT SERPL-CCNC: 36 U/L (ref 10–40)
ANION GAP SERPL CALCULATED.3IONS-SCNC: 8 MMOL/L (ref 3–16)
AST SERPL-CCNC: 29 U/L (ref 15–37)
BASOPHILS ABSOLUTE: 0 K/UL (ref 0–0.2)
BASOPHILS RELATIVE PERCENT: 0.5 %
BILIRUB SERPL-MCNC: 0.3 MG/DL (ref 0–1)
BUN BLDV-MCNC: 14 MG/DL (ref 7–20)
CALCIUM SERPL-MCNC: 9 MG/DL (ref 8.3–10.6)
CHLORIDE BLD-SCNC: 104 MMOL/L (ref 99–110)
CO2: 28 MMOL/L (ref 21–32)
CREAT SERPL-MCNC: 0.9 MG/DL (ref 0.9–1.3)
EOSINOPHILS ABSOLUTE: 0.2 K/UL (ref 0–0.6)
EOSINOPHILS RELATIVE PERCENT: 3 %
GFR AFRICAN AMERICAN: >60
GFR NON-AFRICAN AMERICAN: >60
GLOBULIN: 3 G/DL
GLUCOSE BLD-MCNC: 88 MG/DL (ref 70–99)
HCT VFR BLD CALC: 41.4 % (ref 40.5–52.5)
HEMOGLOBIN: 14.2 G/DL (ref 13.5–17.5)
LYMPHOCYTES ABSOLUTE: 1.6 K/UL (ref 1–5.1)
LYMPHOCYTES RELATIVE PERCENT: 24.8 %
MCH RBC QN AUTO: 32 PG (ref 26–34)
MCHC RBC AUTO-ENTMCNC: 34.3 G/DL (ref 31–36)
MCV RBC AUTO: 93.4 FL (ref 80–100)
MONOCYTES ABSOLUTE: 0.6 K/UL (ref 0–1.3)
MONOCYTES RELATIVE PERCENT: 8.9 %
NEUTROPHILS ABSOLUTE: 4.1 K/UL (ref 1.7–7.7)
NEUTROPHILS RELATIVE PERCENT: 62.8 %
PDW BLD-RTO: 14.4 % (ref 12.4–15.4)
PLATELET # BLD: 219 K/UL (ref 135–450)
PMV BLD AUTO: 8.1 FL (ref 5–10.5)
POTASSIUM SERPL-SCNC: 4.2 MMOL/L (ref 3.5–5.1)
RBC # BLD: 4.44 M/UL (ref 4.2–5.9)
SODIUM BLD-SCNC: 140 MMOL/L (ref 136–145)
TOTAL PROTEIN: 7.4 G/DL (ref 6.4–8.2)
WBC # BLD: 6.4 K/UL (ref 4–11)

## 2021-04-01 PROCEDURE — 3017F COLORECTAL CA SCREEN DOC REV: CPT | Performed by: INTERNAL MEDICINE

## 2021-04-01 PROCEDURE — G8427 DOCREV CUR MEDS BY ELIG CLIN: HCPCS | Performed by: INTERNAL MEDICINE

## 2021-04-01 PROCEDURE — 99213 OFFICE O/P EST LOW 20 MIN: CPT | Performed by: INTERNAL MEDICINE

## 2021-04-01 PROCEDURE — G8417 CALC BMI ABV UP PARAM F/U: HCPCS | Performed by: INTERNAL MEDICINE

## 2021-04-01 PROCEDURE — 4004F PT TOBACCO SCREEN RCVD TLK: CPT | Performed by: INTERNAL MEDICINE

## 2021-04-01 RX ORDER — ALPRAZOLAM 0.5 MG/1
0.5 TABLET ORAL NIGHTLY PRN
COMMUNITY
End: 2021-09-29 | Stop reason: SDUPTHER

## 2021-04-01 SDOH — ECONOMIC STABILITY: TRANSPORTATION INSECURITY
IN THE PAST 12 MONTHS, HAS LACK OF TRANSPORTATION KEPT YOU FROM MEETINGS, WORK, OR FROM GETTING THINGS NEEDED FOR DAILY LIVING?: NO

## 2021-04-01 SDOH — ECONOMIC STABILITY: TRANSPORTATION INSECURITY
IN THE PAST 12 MONTHS, HAS THE LACK OF TRANSPORTATION KEPT YOU FROM MEDICAL APPOINTMENTS OR FROM GETTING MEDICATIONS?: NO

## 2021-04-01 SDOH — ECONOMIC STABILITY: FOOD INSECURITY: WITHIN THE PAST 12 MONTHS, THE FOOD YOU BOUGHT JUST DIDN'T LAST AND YOU DIDN'T HAVE MONEY TO GET MORE.: NEVER TRUE

## 2021-04-01 ASSESSMENT — PATIENT HEALTH QUESTIONNAIRE - PHQ9
1. LITTLE INTEREST OR PLEASURE IN DOING THINGS: 0
SUM OF ALL RESPONSES TO PHQ QUESTIONS 1-9: 0

## 2021-04-01 NOTE — PATIENT INSTRUCTIONS
Thank you for choosing [facility]. Please bring a current list of medications to every appointment. Please contact your pharmacy for any prescription refill(s) that you are requesting. Cigarette Smoking and Its Health Risks   GENERAL INFORMATION:   Smoking and your health: Cigarette smoking is the most preventable cause of illness and death in the [place]. A large number of Americans smoke cigarettes, and each year more than one million children and adults start smoking cigarettes. Many people die every year from illnesses caused by smoking. People who smoke die earlier than those who do not smoke. The risk of disease increases if you smoke a lot, inhale deeply, or have smoked many years. Why are cigarettes bad for you? Cigarettes are filled with poison that goes into the lungs when you inhale. Coughing, dizziness, and burning of the eyes, nose, and throat are early signs that smoking is harming you. Smoking increases your health risks if you have diabetes, high blood pressure, or high blood cholesterol. The long-term problems of smoking cigarettes are the following:   Cancer: Smoking increases your chances of getting cancer. Cigarette smoking may play a role in developing many kinds of cancer. Lung cancer is the most common kind of cancer caused by smoking. A smoker is at greater risk of getting cancer of the lips, mouth, throat, or voice box. Smokers also have a higher risk of getting esophagus, stomach, kidney, pancreas, cervix, bladder, and skin cancer. Heart and blood vessel disease: If you already have heart or blood vessel problems and smoke, you are at even greater risk of having continued or worse health problems. The nicotine in the tobacco causes an increase in your heart rate and blood pressure. The arteries (blood vessels) in your arms and legs tighten and narrow because of the nicotine in cigarette smoke.  Cigarette smoke increases blood clotting, and may damage the cigarette. Men may have problems having an erection. Sleeping problems. Smoking is an expensive (costly) habit. You will save money if you choose to stop smoking. Sore throat. Staining of teeth. Women and smoking: You may have a higher risk of having a heart attack or stroke if you smoke and use birth control pills. This risk is more serious if you are 35 years or older. The risk of losing your unborn baby or having a stillborn baby is higher if you are pregnant and smoke. Babies born to smoking mothers often weigh less, and are at a higher risk of sudden infant death syndrome (SIDS). You may have a harder time getting pregnant if you are a smoker. Women who smoke may have a higher risk of osteoporosis (also known as \"brittle bones\"). Women who smoke also have a higher risk of incontinence, which is when you are unable to control when you urinate. Are there risks with smoking cigars or pipes? The risks are the same for people who smoke cigars or pipes as they are for cigarette smokers. There is a risk of getting cancer of the mouth, lip, larynx (voice box), or esophagus if you smoke a cigar or pipe. What are the risks of using snuff or chewing tobacco (\"smokeless tobacco\")? People who use snuff or chewing tobacco have an increased risk of getting mouth or throat cancer. The risk of heart disease, stroke, blood vessel disease and stomach problems is the same as it is for cigarette smokers. What is \"passive smoking\"? Tobacco smoke is dangerous to others. The effect that smoking has on nonsmokers is called \"passive smoking\". Nonsmokers who breathe tobacco smoke have the same health risks as smokers. Children who are around tobacco smoke may have more colds, ear infections, or other breathing problems. Why should I quit smoking? The benefits from quitting smoking happen right away. Your sense of taste and smell will improve. Your body, clothes, car, and home will not smell of tobacco smoke.  Your chance of getting cancer will be reduced as compared to a person who does not quit. As a former smoker, you will live longer than people who continue to smoke. Women who quit smoking before getting pregnant have a better chance of having a healthy baby. You will decrease the health risks of nonsmokers if you stop smoking. By stopping smoking you will also save money. What is the best way to stop smoking? A large percentage of people have tried to quit smoking at least once. Most people who try to quit smoking go through a series of stages. Following are the stages you may go through to stop smoking: Thinking about quitting. Deciding to quit on a certain day. Quitting smoking. Successfully staying an ex-smoker. You must be strong in order to quit smoking. When you decide to quit, you can get help from your caregiver or others. You will learn that there are many ways to stop smoking. Talk to your caregiver about the best method for you when you are ready to quit smoking. Ask your caregiver for more information about how to stop smoking. Call or write the following for more information about the risks of smoking. Smokefree. gov  Phone: 9-808.633.7260  Web Address: www.smokefree. gov  American Lung Association  [address] [facility],[address]. [address]  Phone: 9-1-892.986.5562  Phone: 5-8-011--039-7784  Web Address: [employer] [address] [place] [address]  Phone: 5-473.883.1867  Web Address: http://[employer]/. gov   CARE AGREEMENT:   You have the right to help plan your care. To help with this plan, you must learn about your health condition and how it may be treated. You can then discuss treatment options with your caregivers. Work with them to decide what care may be used to treat you. You always have the right to refuse treatment. Copyright © 2009. [employer] All rights reserved.  Information is for End User's use only and may not be sold, redistributed or otherwise used for commercial purposes. The above information is an  only. It is not intended as medical advice for individual conditions or treatments. Talk to your doctor, nurse or pharmacist before following any medical regimen to see if it is safe and effective for you. Patient Education        Lower Gastrointestinal Bleeding: Care Instructions  Your Care Instructions     The digestive or gastrointestinal tract goes from the mouth to the anus. It is often called the GI tract. Bleeding in the lower GI tract can happen anywhere in your small or large intestine. It can also happen in your rectum or anus. In some cases, it is caused by an infection, cancer, or inflammatory bowel disease. Or it may be caused by hemorrhoids, diverticulitis, or clotting problems. Light bleeding may not cause any symptoms at first. But if you continue to bleed for a while, you may feel very weak or tired. Sudden, heavy bleeding means you need to see a doctor right away. This kind of bleeding can be very dangerous. But it can usually be cured or controlled. The doctor may do some tests to find the cause of your bleeding. Follow-up care is a key part of your treatment and safety. Be sure to make and go to all appointments, and call your doctor if you are having problems. It's also a good idea to know your test results and keep a list of the medicines you take. How can you care for yourself at home? · Be safe with medicines. Take your medicines exactly as prescribed. Call your doctor if you think you are having a problem with your medicine. You will get more details on the specific medicines your doctor prescribes. · Do not take aspirin or other anti-inflammatory medicines, such as naproxen (Aleve) or ibuprofen (Advil, Motrin), without talking to your doctor first. Ask your doctor if it is okay to use acetaminophen (Tylenol). · Do not drink alcohol. · The bleeding may make you lose iron.  So it's important to eat foods that have a lot of iron. These include red meat, shellfish, poultry, and eggs. They also include beans, raisins, whole-grain breads, and leafy green vegetables. If you want help planning meals, you can meet with a dietitian. When should you call for help? Call 911 anytime you think you may need emergency care. For example, call if:    · You have sudden, severe belly pain.     · You vomit blood or what looks like coffee grounds.     · You passed out (lost consciousness).     · Your stools are maroon or very bloody. Call your doctor now or seek immediate medical care if:    · You are dizzy or lightheaded, or you feel like you may faint.     · Your stools are black and look like tar, or they have streaks of blood.     · You have belly pain.     · You vomit or have nausea. Watch closely for changes in your health, and be sure to contact your doctor if you do not get better as expected. Where can you learn more? Go to https://[employer]oll[employer]. org and sign in to your Cell[employer] account. Enter F874 in the Linkurious box to learn more about \"Lower Gastrointestinal Bleeding: Care Instructions. \"     If you do not have an account, please click on the \"Sign Up Now\" link. Current as of: February 26, 2020               Content Version: 12.8  © 2006-2021 Healthwise, Incorporated. Care instructions adapted under license by [facility] ([facility]). If you have questions about a medical condition or this instruction, always ask your healthcare professional. [place] [address] any warranty or liability for your use of this information. Patient Education        Lower Gastrointestinal Bleeding: Care Instructions  Your Care Instructions     The digestive or gastrointestinal tract goes from the mouth to the anus. It is often called the GI tract. Bleeding in the lower GI tract can happen anywhere in your small or large intestine. It can also happen in your rectum or anus.  In some cases, it is caused by an infection, cancer, or inflammatory bowel disease. Or it may be caused by hemorrhoids, diverticulitis, or clotting problems. Light bleeding may not cause any symptoms at first. But if you continue to bleed for a while, you may feel very weak or tired. Sudden, heavy bleeding means you need to see a doctor right away. This kind of bleeding can be very dangerous. But it can usually be cured or controlled. The doctor may do some tests to find the cause of your bleeding. Follow-up care is a key part of your treatment and safety. Be sure to make and go to all appointments, and call your doctor if you are having problems. It's also a good idea to know your test results and keep a list of the medicines you take. How can you care for yourself at home? · Be safe with medicines. Take your medicines exactly as prescribed. Call your doctor if you think you are having a problem with your medicine. You will get more details on the specific medicines your doctor prescribes. · Do not take aspirin or other anti-inflammatory medicines, such as naproxen (Aleve) or ibuprofen (Advil, Motrin), without talking to your doctor first. Ask your doctor if it is okay to use acetaminophen (Tylenol). · Do not drink alcohol. · The bleeding may make you lose iron. So it's important to eat foods that have a lot of iron. These include red meat, shellfish, poultry, and eggs. They also include beans, raisins, whole-grain breads, and leafy green vegetables. If you want help planning meals, you can meet with a dietitian. When should you call for help? Call 911 anytime you think you may need emergency care. For example, call if:    · You have sudden, severe belly pain.     · You vomit blood or what looks like coffee grounds.     · You passed out (lost consciousness).     · Your stools are maroon or very bloody.    Call your doctor now or seek immediate medical care if:    · You are dizzy or lightheaded, or you feel like you may faint.     · Your stools are black and look like tar, or they have streaks of blood.     · You have belly pain.     · You vomit or have nausea. Watch closely for changes in your health, and be sure to contact your doctor if you do not get better as expected. Where can you learn more? Go to https://chpegalenewmontserrat.[employer]. org and sign in to your Daio account. Enter G964 in the appiris box to learn more about \"Lower Gastrointestinal Bleeding: Care Instructions. \"     If you do not have an account, please click on the \"Sign Up Now\" link. Current as of: February 26, 2020               Content Version: 12.8  © 2006-2021 Healthwise, Incorporated. Care instructions adapted under license by [facility] ([facility]). If you have questions about a medical condition or this instruction, always ask your healthcare professional. [address] any warranty or liability for your use of this information.

## 2021-04-01 NOTE — PROGRESS NOTES
SUBJECTIVE:  Josph Burkitt is a 60 y.o. male being evaluated for:    Chief Complaint   Patient presents with    Rectal Bleeding     patient c/o rectal bleeding (blood in stool) since 4am this morning; loose stools. patient denies abdominal pain, nausea, vomiting, fever. please note patient states that he takes [place] lot\" of Ibuprofen       HPI   Awoke with clots of blood in his sool and then loose bloody stools   Blood around it  Nose bleed drip in there   Always tired and fatigued   Not feeling like will pass out  A little nervous  Slight cramping in his stomach NOt painful  No heart burn  No nausea vomiting Takes lots of ibuprofen for his kneee Checking into knee replacement  [facility] and getting [facility] in his knee       No Known Allergies  Current Outpatient Medications   Medication Sig Dispense Refill    ALPRAZolam (XANAX) 0.5 MG tablet Take 0.5 mg by mouth nightly as needed for Sleep.  Glucosamine-Chondroitin (GLUCOSAMINE CHONDR COMPLEX PO) Take by mouth Take 2-3 by mouth every morning      atorvastatin (LIPITOR) 20 MG tablet TAKE ONE TABLET BY MOUTH DAILY 90 tablet 1    buPROPion (WELLBUTRIN SR) 150 MG extended release tablet Take 1 tablet by mouth 2 times daily 180 tablet 3    Misc Natural Products (GLUCOS-CHONDROIT-MSM COMPLEX PO) Take by mouth daily       No current facility-administered medications for this visit.           Social History     Socioeconomic History    Marital status:      Spouse name: Not on file    Number of children: Not on file    Years of education: Not on file    Highest education level: Not on file   Occupational History    Not on file   Social Needs    Financial resource strain: Not hard at all   [employer] insecurity     Worry: Never true     Inability: Never true   [employer] needs     Medical: No     Non-medical: No   Tobacco Use    Smoking status: Current Every Day Smoker     Packs/day: 0.25     Years: 30.00     Pack years: 7.50    Smokeless tobacco: Never Used    Tobacco comment: down to 4-5 cigs per day   Substance and Sexual Activity    Alcohol use: Yes     Comment: social drink.  Drug use: No    Sexual activity: Yes     Partners: Female   Lifestyle    Physical activity     Days per week: Not on file     Minutes per session: Not on file    Stress: Not on file   Relationships    Social connections     Talks on phone: Not on file     Gets together: Not on file     Attends [employer] service: Not on file     Active member of club or organization: Not on file     Attends meetings of clubs or organizations: Not on file     Relationship status: Not on file    Intimate partner violence     Fear of current or ex partner: Not on file     Emotionally abused: Not on file     Physically abused: Not on file     Forced sexual activity: Not on file   Other Topics Concern    Not on file   Social History Narrative    Not on file      Past Medical History:   Diagnosis Date    Anxiety     Depression     Hyperlipidemia     Torn ACL 2003    left, right    Torn meniscus     left, right     Past Surgical History:   Procedure Laterality Date    DENTAL SURGERY      tooth extraction    KNEE ARTHROSCOPY Left        Review of Systems   Constitutional: Positive for fatigue. Negative for chills and fever. HENT: Positive for nosebleeds. Respiratory: Negative for cough and stridor. Cardiovascular: Negative for chest pain, palpitations and leg swelling. Gastrointestinal: Positive for abdominal pain (mild cramping ), anal bleeding and blood in stool. Negative for constipation, diarrhea, nausea and vomiting. No  Heart burn    Genitourinary: Negative for dysuria. Musculoskeletal: Positive for arthralgias (knees ). Neurological: Negative for dizziness, light-headedness and headaches. Psychiatric/Behavioral: The patient is nervous/anxious.         OBJECTIVE:  BP (!) 158/80 (Site: Right Upper Arm, Position: Sitting, Cuff Size: Medium Adult)   Pulse 66 Erica Huffman MD, Gastroenterology, [facility]        No orders of the defined types were placed in this encounter. No follow-ups on file. Patient Instructions     Thank you for choosing [facility]. Please bring a current list of medications to every appointment. Please contact your pharmacy for any prescription refill(s) that you are requesting. Cigarette Smoking and Its Health Risks   GENERAL INFORMATION:   Smoking and your health: Cigarette smoking is the most preventable cause of illness and death in the [place]. A large number of Americans smoke cigarettes, and each year more than one million children and adults start smoking cigarettes. Many people die every year from illnesses caused by smoking. People who smoke die earlier than those who do not smoke. The risk of disease increases if you smoke a lot, inhale deeply, or have smoked many years. Why are cigarettes bad for you? Cigarettes are filled with poison that goes into the lungs when you inhale. Coughing, dizziness, and burning of the eyes, nose, and throat are early signs that smoking is harming you. Smoking increases your health risks if you have diabetes, high blood pressure, or high blood cholesterol. The long-term problems of smoking cigarettes are the following:   Cancer: Smoking increases your chances of getting cancer. Cigarette smoking may play a role in developing many kinds of cancer. Lung cancer is the most common kind of cancer caused by smoking. A smoker is at greater risk of getting cancer of the lips, mouth, throat, or voice box. Smokers also have a higher risk of getting esophagus, stomach, kidney, pancreas, cervix, bladder, and skin cancer. Heart and blood vessel disease: If you already have heart or blood vessel problems and smoke, you are at even greater risk of having continued or worse health problems.  The nicotine in the tobacco causes an increase in your heart rate and blood of the skin, usually the face. Higher risk of bone fractures, such as hip, wrist, or spine. Higher risk of starting a fire. This may happen if you fall asleep with a lit cigarette. Men may have problems having an erection. Sleeping problems. Smoking is an expensive (costly) habit. You will save money if you choose to stop smoking. Sore throat. Staining of teeth. Women and smoking: You may have a higher risk of having a heart attack or stroke if you smoke and use birth control pills. This risk is more serious if you are 35 years or older. The risk of losing your unborn baby or having a stillborn baby is higher if you are pregnant and smoke. Babies born to smoking mothers often weigh less, and are at a higher risk of sudden infant death syndrome (SIDS). You may have a harder time getting pregnant if you are a smoker. Women who smoke may have a higher risk of osteoporosis (also known as \"brittle bones\"). Women who smoke also have a higher risk of incontinence, which is when you are unable to control when you urinate. Are there risks with smoking cigars or pipes? The risks are the same for people who smoke cigars or pipes as they are for cigarette smokers. There is a risk of getting cancer of the mouth, lip, larynx (voice box), or esophagus if you smoke a cigar or pipe. What are the risks of using snuff or chewing tobacco (\"smokeless tobacco\")? People who use snuff or chewing tobacco have an increased risk of getting mouth or throat cancer. The risk of heart disease, stroke, blood vessel disease and stomach problems is the same as it is for cigarette smokers. What is \"passive smoking\"? Tobacco smoke is dangerous to others. The effect that smoking has on nonsmokers is called \"passive smoking\". Nonsmokers who breathe tobacco smoke have the same health risks as smokers. Children who are around tobacco smoke may have more colds, ear infections, or other breathing problems.   Why should I quit smoking? The benefits from quitting smoking happen right away. Your sense of taste and smell will improve. Your body, clothes, car, and home will not smell of tobacco smoke. Your chance of getting cancer will be reduced as compared to a person who does not quit. As a former smoker, you will live longer than people who continue to smoke. Women who quit smoking before getting pregnant have a better chance of having a healthy baby. You will decrease the health risks of nonsmokers if you stop smoking. By stopping smoking you will also save money. What is the best way to stop smoking? A large percentage of people have tried to quit smoking at least once. Most people who try to quit smoking go through a series of stages. Following are the stages you may go through to stop smoking: Thinking about quitting. Deciding to quit on a certain day. Quitting smoking. Successfully staying an ex-smoker. You must be strong in order to quit smoking. When you decide to quit, you can get help from your caregiver or others. You will learn that there are many ways to stop smoking. Talk to your caregiver about the best method for you when you are ready to quit smoking. Ask your caregiver for more information about how to stop smoking. Call or write the following for more information about the risks of smoking. Smokefree. gov  Phone: 9-740.843.4260  Web Address: www.smokefree. gov  American Lung Association  [address] [place] [address],[address]. [address]  Phone: 0-9-214.442.2028  Phone: 9-9-063--772-8964  Web Address: [employer]. [address] [place] [address]  Phone: 4-809.782.1565  Web Address: http://[employer]/. gov   CARE AGREEMENT:   You have the right to help plan your care. To help with this plan, you must learn about your health condition and how it may be treated. You can then discuss treatment options with your caregivers. Work with them to decide what care may be used to treat you.  You always have the right to refuse treatment. Copyright © 2009. [employer] All rights reserved. Information is for End User's use only and may not be sold, redistributed or otherwise used for commercial purposes. The above information is an  only. It is not intended as medical advice for individual conditions or treatments. Talk to your doctor, nurse or pharmacist before following any medical regimen to see if it is safe and effective for you. Patient Education        Lower Gastrointestinal Bleeding: Care Instructions  Your Care Instructions     The digestive or gastrointestinal tract goes from the mouth to the anus. It is often called the GI tract. Bleeding in the lower GI tract can happen anywhere in your small or large intestine. It can also happen in your rectum or anus. In some cases, it is caused by an infection, cancer, or inflammatory bowel disease. Or it may be caused by hemorrhoids, diverticulitis, or clotting problems. Light bleeding may not cause any symptoms at first. But if you continue to bleed for a while, you may feel very weak or tired. Sudden, heavy bleeding means you need to see a doctor right away. This kind of bleeding can be very dangerous. But it can usually be cured or controlled. The doctor may do some tests to find the cause of your bleeding. Follow-up care is a key part of your treatment and safety. Be sure to make and go to all appointments, and call your doctor if you are having problems. It's also a good idea to know your test results and keep a list of the medicines you take. How can you care for yourself at home? · Be safe with medicines. Take your medicines exactly as prescribed. Call your doctor if you think you are having a problem with your medicine. You will get more details on the specific medicines your doctor prescribes.   · Do not take aspirin or other anti-inflammatory medicines, such as naproxen (Aleve) or ibuprofen (Advil, Motrin), without talking to your doctor first. Ask your doctor if it is okay to use acetaminophen (Tylenol). · Do not drink alcohol. · The bleeding may make you lose iron. So it's important to eat foods that have a lot of iron. These include red meat, shellfish, poultry, and eggs. They also include beans, raisins, whole-grain breads, and leafy green vegetables. If you want help planning meals, you can meet with a dietitian. When should you call for help? Call 911 anytime you think you may need emergency care. For example, call if:    · You have sudden, severe belly pain.     · You vomit blood or what looks like coffee grounds.     · You passed out (lost consciousness).     · Your stools are maroon or very bloody. Call your doctor now or seek immediate medical care if:    · You are dizzy or lightheaded, or you feel like you may faint.     · Your stools are black and look like tar, or they have streaks of blood.     · You have belly pain.     · You vomit or have nausea. Watch closely for changes in your health, and be sure to contact your doctor if you do not get better as expected. Where can you learn more? Go to https://[employer].VBOX. org and sign in to your [employer] account. Enter L229 in the [facility] box to learn more about \"Lower Gastrointestinal Bleeding: Care Instructions. \"     If you do not have an account, please click on the \"Sign Up Now\" link. Current as of: February 26, 2020               Content Version: 12.8  © 2006-2021 Healthwise, Incorporated. Care instructions adapted under license by [facility] ([facility]). If you have questions about a medical condition or this instruction, always ask your healthcare professional. [place] [address] any warranty or liability for your use of this information.          Patient Education        Lower Gastrointestinal Bleeding: Care Instructions  Your Care Instructions     The digestive or gastrointestinal tract goes from the mouth to the anus. It is often called the GI tract. Bleeding in the lower GI tract can happen anywhere in your small or large intestine. It can also happen in your rectum or anus. In some cases, it is caused by an infection, cancer, or inflammatory bowel disease. Or it may be caused by hemorrhoids, diverticulitis, or clotting problems. Light bleeding may not cause any symptoms at first. But if you continue to bleed for a while, you may feel very weak or tired. Sudden, heavy bleeding means you need to see a doctor right away. This kind of bleeding can be very dangerous. But it can usually be cured or controlled. The doctor may do some tests to find the cause of your bleeding. Follow-up care is a key part of your treatment and safety. Be sure to make and go to all appointments, and call your doctor if you are having problems. It's also a good idea to know your test results and keep a list of the medicines you take. How can you care for yourself at home? · Be safe with medicines. Take your medicines exactly as prescribed. Call your doctor if you think you are having a problem with your medicine. You will get more details on the specific medicines your doctor prescribes. · Do not take aspirin or other anti-inflammatory medicines, such as naproxen (Aleve) or ibuprofen (Advil, Motrin), without talking to your doctor first. Ask your doctor if it is okay to use acetaminophen (Tylenol). · Do not drink alcohol. · The bleeding may make you lose iron. So it's important to eat foods that have a lot of iron. These include red meat, shellfish, poultry, and eggs. They also include beans, raisins, whole-grain breads, and leafy green vegetables. If you want help planning meals, you can meet with a dietitian. When should you call for help? Call 911 anytime you think you may need emergency care.  For example, call if:    · You have sudden, severe belly pain.     · You vomit blood or what looks like coffee grounds.     · You passed out (lost consciousness).     · Your stools are maroon or very bloody. Call your doctor now or seek immediate medical care if:    · You are dizzy or lightheaded, or you feel like you may faint.     · Your stools are black and look like tar, or they have streaks of blood.     · You have belly pain.     · You vomit or have nausea. Watch closely for changes in your health, and be sure to contact your doctor if you do not get better as expected. Where can you learn more? Go to https://[employer]peXi3eb.Advanced Patient Care. org and sign in to your [employer] account. Enter W116 in the [employer] box to learn more about \"Lower Gastrointestinal Bleeding: Care Instructions. \"     If you do not have an account, please click on the \"Sign Up Now\" link. Current as of: February 26, 2020               Content Version: 12.8  © 3582-9797 Healthwise, Incorporated. Care instructions adapted under license by [facility] [place] ([facility]). If you have questions about a medical condition or this instruction, always ask your healthcare professional. [address] any warranty or liability for your use of this information.

## 2021-04-01 NOTE — TELEPHONE ENCOUNTER
We schedule him at the end of the day.   Maybe we should get some blood work beforehand the lab might close

## 2021-04-07 NOTE — PROGRESS NOTES
[facility] time__4/13/21 1315__________        Surgery time_____1415_______    Take the following medications with a sip of water:    Do not eat or drink anything after 12:00 midnight prior to your surgery. This includes water chewing gum, mints and ice chips. You may brush your teeth and gargle the morning of your surgery, but do not swallow the water     Please see your family doctor/pediatrician for a history and physical and/or concerning medications. Bring any test results/reports from your physicians office. If you are under the care of a heart doctor or specialist doctor, please be aware that you may be asked to them for clearance    You may be asked to stop blood thinners such as Coumadin, Plavix, Fragmin, Lovenox, etc., or any anti-inflammatories such as:  Aspirin, Ibuprofen, Advil, Naproxen prior to your surgery. We also ask that you stop any OTC medications such as fish oil, vitamin E, glucosamine, garlic, Multivitamins, COQ 10, etc.    We ask that you do not smoke 24 hours prior to surgery  We ask that you do not  drink any alcoholic beverages 24 hours prior to surgery     You must make arrangements for a responsible adult to take you home after your surgery. For your safety you will not be allowed to leave alone or drive yourself home. Your surgery will be cancelled if you do not have a ride home. Also for your safety, it is strongly suggested that someone stay with you the first 24 hours after your surgery. A parent or legal guardian must accompany a child scheduled for surgery and plan to stay at the hospital until the child is discharged. Please do not bring other children with you. For your comfort, please wear simple loose fitting clothing to the hospital.  Please do not bring valuables.     Do not wear any make-up or nail polish on your fingers or toes      For your safety, please do not wear any jewelry or body piercing's on the day of surgery. All jewelry must be removed. If you have dentures, they will be removed before going to operating room. For your convenience, we will provide you with a container. If you wear contact lenses or glasses, they will be removed, please bring a case for them. If you have a living will and a durable power of  for healthcare, please bring in a copy. As part of our patient safety program to minimize surgical site infections, we ask you to do the following:    · Please notify your surgeon if you develop any illness between         now and the  day of your surgery. · This includes a cough, cold, fever, sore throat, nausea,         or vomiting, and diarrhea, etc.  ·  Please notify your surgeon if you experience dizziness, shortness         of breath or blurred vision between now and the time of your surgery. Do not shave your operative site 96 hours prior to surgery. For face and neck surgery, men may use an electric razor 48 hours   prior to surgery. You may shower the night before surgery or the morning of   your surgery with an antibacterial soap. You will need to bring a photo ID and insurance card    [facility] has an onsite pharmacy, would you like to utilize our pharmacy     If you will be staying overnight and use a C-pap machine, please bring   your C-pap to hospital     Our goal is to provide you with excellent care, therefore, visitors will be limited to two(2) in the room at a time so that we may focus on providing this care for you. Please contact pre-admission testing if you have any further questions. [facility] phone number:  [phone]  Please note these are generalized instructions for all surgical cases, you may be provided with more specific instructions according to your surgery.

## 2021-04-07 NOTE — PROGRESS NOTES
Preoperative Screening for Elective Surgery/Invasive Procedures While COVID-19 present in the community     Have you tested positive or have been told to self-isolate for COVID-19 like symptoms within the past 28 days?  Do you currently have any of the following symptoms? o Fever >100.0 F or 99.9 F in immunocompromised patients? o New onset cough, shortness of breath or difficulty breathing?  o New onset sore throat, myalgia (muscle aches and pains), headache, loss of taste/smell or diarrhea?  Have you had a potential exposure to COVID-19 within the past 14 days by:  o Close contact with a confirmed case? o Close contact with a healthcare worker,  or essential infrastructure worker (grocery store, [employer], gas station, public utilities or transportation)? o Do you reside in a congregate setting such as; skilled nursing facility, adult home, correctional facility, homeless shelter or other institutional setting?  o Have you had recent travel to a known COVID-19 hotspot? Indicate if the patient has a positive screen by answering yes to one or more of the above questions. Patients who test positive or screen positive prior to surgery or on the day of surgery should be evaluated in conjunction with the surgeon/proceduralist/anesthesiologist to determine the urgency of the procedure. no to all above. What Type Of Note Output Would You Prefer (Optional)?: Bullet Format Is This A New Presentation, Or A Follow-Up?: Skin Lesions Which Family Member (Optional)?: Father Which Family Member (Optional)?: Mother

## 2021-04-10 ASSESSMENT — ENCOUNTER SYMPTOMS
NAUSEA: 0
CONSTIPATION: 0
ANAL BLEEDING: 1
BLOOD IN STOOL: 1
ABDOMINAL PAIN: 1
COUGH: 0
STRIDOR: 0
DIARRHEA: 0
VOMITING: 0

## 2021-04-12 ENCOUNTER — ANESTHESIA EVENT (OUTPATIENT)
Dept: ENDOSCOPY | Age: 56
End: 2021-04-12
Payer: COMMERCIAL

## 2021-04-13 ENCOUNTER — ANESTHESIA (OUTPATIENT)
Dept: ENDOSCOPY | Age: 56
End: 2021-04-13
Payer: COMMERCIAL

## 2021-04-13 ENCOUNTER — HOSPITAL ENCOUNTER (OUTPATIENT)
Age: 56
Setting detail: OUTPATIENT SURGERY
Discharge: HOME | End: 2021-04-13
Attending: INTERNAL MEDICINE | Admitting: INTERNAL MEDICINE
Payer: COMMERCIAL

## 2021-04-13 VITALS
BODY MASS INDEX: 31.55 KG/M2 | RESPIRATION RATE: 16 BRPM | WEIGHT: 201 LBS | SYSTOLIC BLOOD PRESSURE: 162 MMHG | TEMPERATURE: 97.3 F | OXYGEN SATURATION: 98 % | HEART RATE: 54 BPM | HEIGHT: 67 IN | DIASTOLIC BLOOD PRESSURE: 97 MMHG

## 2021-04-13 VITALS
RESPIRATION RATE: 16 BRPM | DIASTOLIC BLOOD PRESSURE: 65 MMHG | OXYGEN SATURATION: 98 % | SYSTOLIC BLOOD PRESSURE: 143 MMHG

## 2021-04-13 PROCEDURE — 3700000001 HC ADD 15 MINUTES (ANESTHESIA): Performed by: INTERNAL MEDICINE

## 2021-04-13 PROCEDURE — 3609027000 HC COLONOSCOPY: Performed by: INTERNAL MEDICINE

## 2021-04-13 PROCEDURE — 2580000003 HC RX 258: Performed by: ANESTHESIOLOGY

## 2021-04-13 PROCEDURE — 6360000002 HC RX W HCPCS: Performed by: NURSE ANESTHETIST, CERTIFIED REGISTERED

## 2021-04-13 PROCEDURE — 3700000000 HC ANESTHESIA ATTENDED CARE: Performed by: INTERNAL MEDICINE

## 2021-04-13 PROCEDURE — 7100000011 HC PHASE II RECOVERY - ADDTL 15 MIN: Performed by: INTERNAL MEDICINE

## 2021-04-13 PROCEDURE — 7100000010 HC PHASE II RECOVERY - FIRST 15 MIN: Performed by: INTERNAL MEDICINE

## 2021-04-13 RX ORDER — SODIUM CHLORIDE 0.9 % (FLUSH) 0.9 %
10 SYRINGE (ML) INJECTION PRN
Status: DISCONTINUED | OUTPATIENT
Start: 2021-04-13 | End: 2021-04-13 | Stop reason: HOSPADM

## 2021-04-13 RX ORDER — ONDANSETRON 2 MG/ML
4 INJECTION INTRAMUSCULAR; INTRAVENOUS
Status: DISCONTINUED | OUTPATIENT
Start: 2021-04-13 | End: 2021-04-13 | Stop reason: HOSPADM

## 2021-04-13 RX ORDER — SODIUM CHLORIDE 0.9 % (FLUSH) 0.9 %
10 SYRINGE (ML) INJECTION EVERY 12 HOURS SCHEDULED
Status: DISCONTINUED | OUTPATIENT
Start: 2021-04-13 | End: 2021-04-13 | Stop reason: HOSPADM

## 2021-04-13 RX ORDER — SODIUM CHLORIDE 9 MG/ML
INJECTION, SOLUTION INTRAVENOUS CONTINUOUS
Status: DISCONTINUED | OUTPATIENT
Start: 2021-04-13 | End: 2021-04-13 | Stop reason: HOSPADM

## 2021-04-13 RX ORDER — SODIUM CHLORIDE 9 MG/ML
25 INJECTION, SOLUTION INTRAVENOUS PRN
Status: DISCONTINUED | OUTPATIENT
Start: 2021-04-13 | End: 2021-04-13 | Stop reason: HOSPADM

## 2021-04-13 RX ORDER — PROPOFOL 10 MG/ML
INJECTION, EMULSION INTRAVENOUS PRN
Status: DISCONTINUED | OUTPATIENT
Start: 2021-04-13 | End: 2021-04-13 | Stop reason: SDUPTHER

## 2021-04-13 RX ADMIN — SODIUM CHLORIDE: 9 INJECTION, SOLUTION INTRAVENOUS at 13:49

## 2021-04-13 RX ADMIN — PROPOFOL 150 MG: 10 INJECTION, EMULSION INTRAVENOUS at 14:17

## 2021-04-13 RX ADMIN — PROPOFOL 50 MG: 10 INJECTION, EMULSION INTRAVENOUS at 14:23

## 2021-04-13 ASSESSMENT — ENCOUNTER SYMPTOMS: SHORTNESS OF BREATH: 0

## 2021-04-13 ASSESSMENT — LIFESTYLE VARIABLES: SMOKING_STATUS: 1

## 2021-04-13 ASSESSMENT — PAIN SCALES - GENERAL: PAINLEVEL_OUTOF10: 0

## 2021-04-13 NOTE — H&P
[facility]   Pre-operative History and Physical    Patient: Shiloh Cobb  : 1965  Acct#: [de-identified]    History Obtained From: electronic medical record    HISTORY OF PRESENT ILLNESS  Procedure:Colonoscopy  Indications:rectal bleeding  Past Medical History:        Diagnosis Date    Anxiety     Depression     Hyperlipidemia     Torn ACL     left, right    Torn meniscus     left, right     Past Surgical History:        Procedure Laterality Date    DENTAL SURGERY      tooth extraction    KNEE ARTHROSCOPY Left      Medications prior to admission:   Prior to Admission medications    Medication Sig Start Date End Date Taking? Authorizing Provider   ALPRAZolam Maddi Harding) 0.5 MG tablet Take 0.5 mg by mouth nightly as needed for Sleep. Yes Historical Provider, MD   Glucosamine-Chondroitin (GLUCOSAMINE CHONDR COMPLEX PO) Take by mouth Take 2-3 by mouth every morning   Yes Historical Provider, MD   atorvastatin (LIPITOR) 20 MG tablet TAKE ONE TABLET BY MOUTH DAILY 3/5/21  Yes Joey Dunne MD   buPROPion [facility] SR) 150 MG extended release tablet Take 1 tablet by mouth 2 times daily 21  Yes Joey Dunne MD   Misc Natural Products (GLUCOS-CHONDROIT-MSM COMPLEX PO) Take by mouth daily   Yes Historical Provider, MD     Allergies:   Patient has no known allergies.     Social History     Socioeconomic History    Marital status:      Spouse name: Not on file    Number of children: Not on file    Years of education: Not on file    Highest education level: Not on file   Occupational History    Not on file   Social Needs    Financial resource strain: Not hard at all   [employer] insecurity     Worry: Never true     Inability: Never true   [employer] needs     Medical: No     Non-medical: No   Tobacco Use    Smoking status: Current Every Day Smoker     Packs/day: 0.25     Years: 30.00     Pack years: 7.50    Smokeless tobacco: Never Used    Tobacco comment: down to 4-5 cigs per day Substance and Sexual Activity    Alcohol use: Yes     Comment: social drink.  Drug use: No    Sexual activity: Yes     Partners: Female   Lifestyle    Physical activity     Days per week: Not on file     Minutes per session: Not on file    Stress: Not on file   Relationships    Social connections     Talks on phone: Not on file     Gets together: Not on file     Attends Hinduism service: Not on file     Active member of club or organization: Not on file     Attends meetings of clubs or organizations: Not on file     Relationship status: Not on file    Intimate partner violence     Fear of current or ex partner: Not on file     Emotionally abused: Not on file     Physically abused: Not on file     Forced sexual activity: Not on file   Other Topics Concern    Not on file   Social History Narrative    Not on file     Family History   Problem Relation Age of Onset    Heart Disease Mother          PHYSICAL EXAM:      Pulse 62   Temp 97.8 °F (36.6 °C) (Temporal)   Ht 5' 7\" (1.702 m)   Wt 201 lb (91.2 kg)   BMI 31.48 kg/m²  I        Heart:normal    Lungs: normal    Abdomen: normal      ASA Grade:  See anesthesia note      ASSESSMENT AND PLAN:    1. Procedure options, risks and benefits reviewed with patient and expresses understanding.

## 2021-04-13 NOTE — ANESTHESIA POSTPROCEDURE EVALUATION
Department of Anesthesiology  Postprocedure Note    Patient: Pam Zepeda  MRN: [id number]  YOB: 1965  Date of evaluation: 4/13/2021  Time:  2:52 PM     Procedure Summary     Date: 04/13/21 Room / Location: [address] [place] [address]    Anesthesia Start: 2172 Anesthesia Stop: 3647    Procedure: COLONOSCOPY DIAGNOSTIC (N/A ) Diagnosis:       Rectal bleeding      (Rectal bleeding)    Surgeons: Chucho Baxter MD Responsible Provider: Praful Randhawa MD    Anesthesia Type: MAC ASA Status: 2          Anesthesia Type: MAC    Fran Phase I: Fran Score: 10    Fran Phase II: Fran Score: 10    Last vitals: Reviewed and per EMR flowsheets.        Anesthesia Post Evaluation    Patient location during evaluation: PACU  Patient participation: complete - patient participated  Level of consciousness: awake and alert  Airway patency: patent  Nausea & Vomiting: no nausea and no vomiting  Complications: no  Cardiovascular status: hemodynamically stable  Respiratory status: acceptable  Hydration status: stable

## 2021-04-13 NOTE — ANESTHESIA PRE PROCEDURE
left, right       Past Surgical History:        Procedure Laterality Date    DENTAL SURGERY      tooth extraction    KNEE ARTHROSCOPY Left        Social History:    Social History     Tobacco Use    Smoking status: Current Every Day Smoker     Packs/day: 0.25     Years: 30.00     Pack years: 7.50    Smokeless tobacco: Never Used    Tobacco comment: down to 4-5 cigs per day   Substance Use Topics    Alcohol use: Yes     Comment: social drink. Ready to quit: Not Answered  Counseling given: Not Answered  Comment: down to 4-5 cigs per day      Vital Signs (Current):   Vitals:    04/07/21 1051 04/13/21 1341   Pulse:  62   Temp:  97.8 °F (36.6 °C)   TempSrc:  Temporal   Weight: 201 lb (91.2 kg)    Height: 5' 7\" (1.702 m)                                               BP Readings from Last 3 Encounters:   04/01/21 (!) 158/80   09/16/19 (!) 173/105   12/07/18 120/76       NPO Status: Time of last liquid consumption: 0915                        Time of last solid consumption: 1030                        Date of last liquid consumption: 04/13/21                        Date of last solid food consumption: 04/12/21    BMI:   Wt Readings from Last 3 Encounters:   04/07/21 201 lb (91.2 kg)   04/01/21 201 lb 6.4 oz (91.4 kg)   02/22/21 210 lb (95.3 kg)     Body mass index is 31.48 kg/m².     CBC:   Lab Results   Component Value Date    WBC 6.4 04/01/2021    RBC 4.44 04/01/2021    HGB 14.2 04/01/2021    HCT 41.4 04/01/2021    MCV 93.4 04/01/2021    RDW 14.4 04/01/2021     04/01/2021       CMP:   Lab Results   Component Value Date     04/01/2021    K 4.2 04/01/2021     04/01/2021    CO2 28 04/01/2021    BUN 14 04/01/2021    CREATININE 0.9 04/01/2021    GFRAA >60 04/01/2021    AGRATIO 1.5 04/01/2021    LABGLOM >60 04/01/2021    GLUCOSE 88 04/01/2021    PROT 7.4 04/01/2021    CALCIUM 9.0 04/01/2021    BILITOT 0.3 04/01/2021    ALKPHOS 120 04/01/2021    AST 29 04/01/2021    ALT 36 04/01/2021       POC Tests: No results for input(s): POCGLU, POCNA, POCK, POCCL, POCBUN, POCHEMO, POCHCT in the last 72 hours. Coags: No results found for: PROTIME, INR, APTT    HCG (If Applicable): No results found for: PREGTESTUR, PREGSERUM, HCG, HCGQUANT     ABGs: No results found for: PHART, PO2ART, NVO3WPC, SRL4RCI, BEART, Q5DQWHKK     Type & Screen (If Applicable):  No results found for: LABABO, LABRH    Drug/Infectious Status (If Applicable):  No results found for: HIV, HEPCAB    COVID-19 Screening (If Applicable): No results found for: COVID19        Anesthesia Evaluation  Patient summary reviewed no history of anesthetic complications:   Airway: Mallampati: III  TM distance: >3 FB   Neck ROM: full  Mouth opening: > = 3 FB Dental:      Comment: No loose teeth    Pulmonary: breath sounds clear to auscultation  (+) current smoker    (-) COPD, asthma, shortness of breath, recent URI and sleep apnea                           Cardiovascular:        (-) hypertension, valvular problems/murmurs, past MI, CAD, CABG/stent, dysrhythmias,  angina,  CHF, orthopnea and murmur      Rhythm: regular  Rate: normal                    Neuro/Psych:   (+) psychiatric history:depression/anxiety    (-) seizures, neuromuscular disease, TIA and CVA           GI/Hepatic/Renal:   (+) bowel prep,      (-) GERD, PUD, hepatitis, liver disease and no renal disease       Endo/Other:        (-) diabetes mellitus, hypothyroidism, hyperthyroidism, blood dyscrasia, arthritis               Abdominal:           Vascular:                                      Anesthesia Plan      MAC     ASA 2       Induction: intravenous. Anesthetic plan and risks discussed with patient. Plan discussed with CRNA. This pre-anesthesia assessment may be used as a history and physical.    DOS STAFF ADDENDUM:    Pt seen and examined, chart reviewed (including anesthesia, drug and allergy history).   No interval changes to history and physical examination. Anesthetic plan, risks, benefits, alternatives, and personnel involved discussed with patient. Patient verbalized an understanding and agrees to proceed.       Power Mahre MD  April 13, 2021  1:53 PM      Power Maher MD   4/13/2021

## 2021-09-15 DIAGNOSIS — E78.00 PURE HYPERCHOLESTEROLEMIA: ICD-10-CM

## 2021-09-15 DIAGNOSIS — Z12.5 PROSTATE CANCER SCREENING: Primary | ICD-10-CM

## 2021-09-15 RX ORDER — ATORVASTATIN CALCIUM 20 MG/1
TABLET, FILM COATED ORAL
Qty: 90 TABLET | Refills: 0 | Status: SHIPPED | OUTPATIENT
Start: 2021-09-15 | End: 2021-12-13

## 2021-09-28 ENCOUNTER — TELEPHONE (OUTPATIENT)
Dept: FAMILY MEDICINE CLINIC | Age: 56
End: 2021-09-28

## 2021-09-28 NOTE — TELEPHONE ENCOUNTER
----- Message from Maranda Moran sent at 9/28/2021  3:22 PM EDT -----  Subject: Message to Provider    QUESTIONS  Information for Provider? Pt is asking what can he drink since he is   having fasting labs and his appt is tomorrow. He is outside working and   asked that you leave him a message. ---------------------------------------------------------------------------  --------------  [employer]  What is the best way for the office to contact you? OK to leave message on   voicemail  Preferred Call Back Phone Number? [phone]  ---------------------------------------------------------------------------  --------------  SCRIPT ANSWERS  Relationship to Patient?  Self

## 2021-09-29 ENCOUNTER — OFFICE VISIT (OUTPATIENT)
Dept: FAMILY MEDICINE CLINIC | Age: 56
End: 2021-09-29
Payer: COMMERCIAL

## 2021-09-29 VITALS
TEMPERATURE: 97.6 F | SYSTOLIC BLOOD PRESSURE: 142 MMHG | DIASTOLIC BLOOD PRESSURE: 87 MMHG | BODY MASS INDEX: 31.48 KG/M2 | WEIGHT: 200.6 LBS | HEIGHT: 67 IN | OXYGEN SATURATION: 97 % | HEART RATE: 98 BPM

## 2021-09-29 DIAGNOSIS — E78.00 PURE HYPERCHOLESTEROLEMIA: Primary | ICD-10-CM

## 2021-09-29 DIAGNOSIS — M17.0 PRIMARY OSTEOARTHRITIS OF BOTH KNEES: ICD-10-CM

## 2021-09-29 DIAGNOSIS — F32.A ANXIETY AND DEPRESSION: ICD-10-CM

## 2021-09-29 DIAGNOSIS — K58.9 IRRITABLE BOWEL SYNDROME, UNSPECIFIED TYPE: ICD-10-CM

## 2021-09-29 DIAGNOSIS — F41.9 ANXIETY AND DEPRESSION: ICD-10-CM

## 2021-09-29 PROCEDURE — 90715 TDAP VACCINE 7 YRS/> IM: CPT | Performed by: INTERNAL MEDICINE

## 2021-09-29 PROCEDURE — 99213 OFFICE O/P EST LOW 20 MIN: CPT | Performed by: INTERNAL MEDICINE

## 2021-09-29 PROCEDURE — 4004F PT TOBACCO SCREEN RCVD TLK: CPT | Performed by: INTERNAL MEDICINE

## 2021-09-29 PROCEDURE — G8427 DOCREV CUR MEDS BY ELIG CLIN: HCPCS | Performed by: INTERNAL MEDICINE

## 2021-09-29 PROCEDURE — 90674 CCIIV4 VAC NO PRSV 0.5 ML IM: CPT | Performed by: INTERNAL MEDICINE

## 2021-09-29 PROCEDURE — G8417 CALC BMI ABV UP PARAM F/U: HCPCS | Performed by: INTERNAL MEDICINE

## 2021-09-29 PROCEDURE — 3017F COLORECTAL CA SCREEN DOC REV: CPT | Performed by: INTERNAL MEDICINE

## 2021-09-29 PROCEDURE — 90472 IMMUNIZATION ADMIN EACH ADD: CPT | Performed by: INTERNAL MEDICINE

## 2021-09-29 PROCEDURE — 90471 IMMUNIZATION ADMIN: CPT | Performed by: INTERNAL MEDICINE

## 2021-09-29 RX ORDER — DICYCLOMINE HYDROCHLORIDE 10 MG/1
10 CAPSULE ORAL 3 TIMES DAILY PRN
Qty: 90 CAPSULE | Refills: 0 | Status: SHIPPED | OUTPATIENT
Start: 2021-09-29 | End: 2021-10-27

## 2021-09-29 RX ORDER — VIT C/B6/B5/MAGNESIUM/HERB 173 50-5-6-5MG
CAPSULE ORAL DAILY
COMMUNITY
End: 2022-07-07

## 2021-09-29 RX ORDER — VENLAFAXINE HYDROCHLORIDE 150 MG/1
150 CAPSULE, EXTENDED RELEASE ORAL DAILY
Qty: 30 CAPSULE | Refills: 2 | Status: SHIPPED | OUTPATIENT
Start: 2021-09-29 | End: 2021-12-27

## 2021-09-29 RX ORDER — ALPRAZOLAM 0.5 MG/1
0.5 TABLET ORAL NIGHTLY PRN
Qty: 30 TABLET | Refills: 2 | Status: SHIPPED | OUTPATIENT
Start: 2021-09-29 | End: 2022-02-08

## 2021-09-29 RX ORDER — THIAMINE MONONITRATE (VIT B1) 100 MG
100 TABLET ORAL DAILY
COMMUNITY

## 2021-09-29 ASSESSMENT — ENCOUNTER SYMPTOMS
NAUSEA: 0
BLOOD IN STOOL: 0
ABDOMINAL DISTENTION: 1
VOMITING: 0
TROUBLE SWALLOWING: 0
ABDOMINAL PAIN: 0
DIARRHEA: 0
SINUS PRESSURE: 0
CONSTIPATION: 0
COUGH: 0
SHORTNESS OF BREATH: 0

## 2021-09-29 NOTE — PROGRESS NOTES
SUBJECTIVE:  Lindsey Pritchard is a 60 y.o. male being evaluated for:    Chief Complaint   Patient presents with   Mason Saldana Doctor      Dr Anisa Whitman patient     Knee Pain       HPI     Bone on bone in  both knees Seeing ortho next month Dr Eveline Mei weight and stomach always is bloated and feels uncomfortable  NO Nausea diatthea  No heart burn  Gassy  Colonoscopy     Wellbutrin not helping  Wife says has adhd  Depressed and sad  Son has autism and is a handful   Anxious and nervous at times  Trouble sleeping Has been on zoloft in the past Trouble completing projects around the house  Not organized at home     No Known Allergies  Current Outpatient Medications   Medication Sig Dispense Refill    turmeric 500 MG CAPS Take by mouth daily      B Complex Vitamins (VITAMIN B COMPLEX PO) Take by mouth      vitamin B-1 (THIAMINE) 100 MG tablet Take 100 mg by mouth daily      dicyclomine (BENTYL) 10 MG capsule Take 1 capsule by mouth 3 times daily as needed (abdominal bloating) 90 capsule 0    ALPRAZolam (XANAX) 0.5 MG tablet Take 1 tablet by mouth nightly as needed for Sleep for up to 90 days. 30 tablet 2    venlafaxine (EFFEXOR XR) 150 MG extended release capsule Take 1 capsule by mouth daily 30 capsule 2    atorvastatin (LIPITOR) 20 MG tablet TAKE ONE TABLET BY MOUTH DAILY 90 tablet 0    Glucosamine-Chondroitin (GLUCOSAMINE CHONDR COMPLEX PO) Take by mouth Take 2-3 by mouth every morning      buPROPion (WELLBUTRIN SR) 150 MG extended release tablet Take 1 tablet by mouth 2 times daily 180 tablet 3    Misc Natural Products (GLUCOS-CHONDROIT-MSM COMPLEX PO) Take by mouth daily       No current facility-administered medications for this visit.          Social History     Socioeconomic History    Marital status:      Spouse name: Not on file    Number of children: Not on file    Years of education: Not on file    Highest education level: Not on file   Occupational History    Not on file   Tobacco Use    Smoking status: Current Every Day Smoker     Packs/day: 0.25     Years: 30.00     Pack years: 7.50    Smokeless tobacco: Never Used    Tobacco comment: down to 4-5 cigs per day   Vaping Use    Vaping Use: Never used   Substance and Sexual Activity    Alcohol use: Yes     Comment: social drink.  Drug use: No    Sexual activity: Yes     Partners: Female   Other Topics Concern    Not on file   Social History Narrative    Not on file     Social Determinants of Health     Financial Resource Strain: Low Risk     Difficulty of Paying Living Expenses: Not hard at all   Food Insecurity: No Food Insecurity    Worried About Running Out of Food in the Last Year: Never true    [address] in the Last Year: Never true   Transportation Needs: No Transportation Needs    Lack of Transportation (Medical): No    Lack of Transportation (Non-Medical):  No   Physical Activity:     Days of Exercise per Week:     Minutes of Exercise per Session:    Stress:     Feeling of Stress :    Social Connections:     Frequency of Communication with Friends and Family:     Frequency of Social Gatherings with Friends and Family:     Attends Anabaptist Services:     Active Member of Clubs or Organizations:     Attends Club or Organization Meetings:     Marital Status:    Intimate Partner Violence:     Fear of Current or Ex-Partner:     Emotionally Abused:     Physically Abused:     Sexually Abused:       Past Medical History:   Diagnosis Date    Anxiety     Depression     Hyperlipidemia     Osteoarthritis     knees    Torn ACL 2003    left, right    Torn meniscus     left, right     Past Surgical History:   Procedure Laterality Date    COLONOSCOPY N/A 4/13/2021    COLONOSCOPY DIAGNOSTIC performed by Dee Salgado MD at [address]      tooth extraction    KNEE ARTHROSCOPY Left      Family History   Problem Relation Age of Onset    Heart Disease Mother    Kimberly Rojo Other Mother Pulmonary embolism     Other Father         does not know     Cancer Maternal Grandfather         abdominal cancer of some kind      Review of Systems   Constitutional: Positive for fatigue. Negative for activity change and unexpected weight change. HENT: Negative for congestion, nosebleeds, sinus pressure and trouble swallowing. Eyes: Negative for visual disturbance (glasses and due to see eye doctor ). Respiratory: Negative for cough and shortness of breath. Cardiovascular: Negative for chest pain, palpitations and leg swelling. Gastrointestinal: Positive for abdominal distention. Negative for abdominal pain, blood in stool, constipation, diarrhea, nausea and vomiting. No heart burn    Genitourinary: Negative for difficulty urinating. Musculoskeletal: Positive for arthralgias. Neurological: Negative for dizziness, light-headedness and headaches. Psychiatric/Behavioral: Positive for dysphoric mood and sleep disturbance. Negative for agitation, behavioral problems, decreased concentration and suicidal ideas. The patient is nervous/anxious and is hyperactive. OBJECTIVE:  BP (!) 142/87 (Site: Right Upper Arm, Position: Sitting, Cuff Size: Medium Adult)   Pulse 98   Temp 97.6 °F (36.4 °C) (Temporal)   Ht 5' 7\" (1.702 m)   Wt 200 lb 9.6 oz (91 kg)   SpO2 97%   BMI 31.42 kg/m²      Body mass index is 31.42 kg/m². Physical Exam  Constitutional:       General: He is not in acute distress. Appearance: Normal appearance. He is well-developed. HENT:      Head: Normocephalic and atraumatic. Right Ear: External ear normal.      Left Ear: External ear normal.      Nose: Nose normal.      Mouth/Throat:      Pharynx: Oropharynx is clear. Eyes:      Conjunctiva/sclera: Conjunctivae normal.   Neck:      Thyroid: No thyromegaly. Vascular: No carotid bruit or JVD. Trachea: No tracheal deviation. Cardiovascular:      Rate and Rhythm: Normal rate and regular rhythm. Heart sounds: Normal heart sounds. No murmur heard. No friction rub. No gallop. Pulmonary:      Effort: Pulmonary effort is normal.      Breath sounds: Normal breath sounds. Chest:      Chest wall: No tenderness. Abdominal:      General: Bowel sounds are normal. There is no distension. Palpations: Abdomen is soft. Tenderness: There is no abdominal tenderness. Comments: No  hsm    Genitourinary:     Penis: No tenderness. Musculoskeletal:         General: No swelling. Cervical back: Neck supple. Lymphadenopathy:      Cervical: No cervical adenopathy. Skin:     General: Skin is warm and dry. Neurological:      General: No focal deficit present. Mental Status: He is alert. Motor: No abnormal muscle tone. Gait: Gait normal.      Deep Tendon Reflexes: Reflexes are normal and symmetric. Psychiatric:         Behavior: Behavior normal.         Thought Content: Thought content normal.         ASSESSMENT/PLAN:    Indigo Rinaldi was seen today for established new doctor and knee pain. Diagnoses and all orders for this visit:    Pure hypercholesterolemia has order for labs     Anxiety and depression reviewed oarrs   -     ALPRAZolam (XANAX) 0.5 MG tablet; Take 1 tablet by mouth nightly as needed for Sleep for up to 90 days. Primary osteoarthritis of both knees following with Dr Mag Heck     Irritable bowel syndrome, unspecified type  -     dicyclomine (BENTYL) 10 MG capsule;  Take 1 capsule by mouth 3 times daily as needed (abdominal bloating)    vaccine  -     INFLUENZA, MDCK QUADV, 2 YRS AND OLDER, IM, PF, PREFILL SYR OR SDV, 0.5ML (FLUCELVAX QUADV, PF)  -     Tdap (age 6y and older) IM (BOOSTRIX)          Orders Placed This Encounter   Medications    dicyclomine (BENTYL) 10 MG capsule     Sig: Take 1 capsule by mouth 3 times daily as needed (abdominal bloating)     Dispense:  90 capsule     Refill:  0    ALPRAZolam (XANAX) 0.5 MG tablet     Sig: Take 1 tablet by mouth nightly as needed for Sleep for up to 90 days. Dispense:  30 tablet     Refill:  2    venlafaxine (EFFEXOR XR) 150 MG extended release capsule     Sig: Take 1 capsule by mouth daily     Dispense:  30 capsule     Refill:  2        Return in about 3 months (around 12/29/2021), or if symptoms worsen or fail to improve, for medication monitoring. There are no Patient Instructions on file for this visit.

## 2021-10-27 RX ORDER — DICYCLOMINE HYDROCHLORIDE 10 MG/1
CAPSULE ORAL
Qty: 90 CAPSULE | Refills: 3 | Status: SHIPPED | OUTPATIENT
Start: 2021-10-27 | End: 2022-08-01

## 2021-12-11 DIAGNOSIS — E78.00 PURE HYPERCHOLESTEROLEMIA: ICD-10-CM

## 2021-12-13 RX ORDER — ATORVASTATIN CALCIUM 20 MG/1
TABLET, FILM COATED ORAL
Qty: 90 TABLET | Refills: 1 | Status: SHIPPED | OUTPATIENT
Start: 2021-12-13 | End: 2022-06-09

## 2021-12-27 RX ORDER — VENLAFAXINE HYDROCHLORIDE 150 MG/1
CAPSULE, EXTENDED RELEASE ORAL
Qty: 30 CAPSULE | Refills: 2 | Status: SHIPPED | OUTPATIENT
Start: 2021-12-27 | End: 2022-04-15 | Stop reason: SDUPTHER

## 2022-01-11 ENCOUNTER — OFFICE VISIT (OUTPATIENT)
Dept: FAMILY MEDICINE CLINIC | Age: 57
End: 2022-01-11
Payer: COMMERCIAL

## 2022-01-11 VITALS
HEIGHT: 67 IN | HEART RATE: 76 BPM | OXYGEN SATURATION: 96 % | BODY MASS INDEX: 32.33 KG/M2 | DIASTOLIC BLOOD PRESSURE: 86 MMHG | WEIGHT: 206 LBS | SYSTOLIC BLOOD PRESSURE: 152 MMHG | TEMPERATURE: 98.2 F

## 2022-01-11 DIAGNOSIS — E78.00 PURE HYPERCHOLESTEROLEMIA: ICD-10-CM

## 2022-01-11 DIAGNOSIS — R94.31 NONSPECIFIC ST-T WAVE ELECTROCARDIOGRAPHIC CHANGES: ICD-10-CM

## 2022-01-11 DIAGNOSIS — F41.9 ANXIETY AND DEPRESSION: ICD-10-CM

## 2022-01-11 DIAGNOSIS — F32.A ANXIETY AND DEPRESSION: ICD-10-CM

## 2022-01-11 DIAGNOSIS — M17.12 PRIMARY OSTEOARTHRITIS OF LEFT KNEE: ICD-10-CM

## 2022-01-11 DIAGNOSIS — Z01.818 PRE-OP EXAMINATION: Primary | ICD-10-CM

## 2022-01-11 DIAGNOSIS — R03.0 BORDERLINE HYPERTENSION: ICD-10-CM

## 2022-01-11 PROCEDURE — G8417 CALC BMI ABV UP PARAM F/U: HCPCS | Performed by: INTERNAL MEDICINE

## 2022-01-11 PROCEDURE — G8482 FLU IMMUNIZE ORDER/ADMIN: HCPCS | Performed by: INTERNAL MEDICINE

## 2022-01-11 PROCEDURE — G8427 DOCREV CUR MEDS BY ELIG CLIN: HCPCS | Performed by: INTERNAL MEDICINE

## 2022-01-11 PROCEDURE — 99212 OFFICE O/P EST SF 10 MIN: CPT | Performed by: INTERNAL MEDICINE

## 2022-01-11 PROCEDURE — 93000 ELECTROCARDIOGRAM COMPLETE: CPT | Performed by: INTERNAL MEDICINE

## 2022-01-11 ASSESSMENT — ENCOUNTER SYMPTOMS
ABDOMINAL PAIN: 0
BACK PAIN: 1
NAUSEA: 0
APNEA: 0
TROUBLE SWALLOWING: 0
COUGH: 0
BLOOD IN STOOL: 0
DIARRHEA: 0
SHORTNESS OF BREATH: 0
CONSTIPATION: 0
VOMITING: 0

## 2022-01-11 NOTE — PROGRESS NOTES
SUBJECTIVE:  Trina Pena is a 60 y.o. male being evaluated for:    Chief Complaint   Patient presents with   Judith Alvares Pre-op Exam      Fax number  [phone]  Patient is having surgery on 2022 with Shantel Cowan of knee replacement. HPI   Bad arthritis in his left knee worsening with trouble ambulating  Has tried shots for 2 years and no longer lasting    Uses lots of ibuprofen  Does home exercises  Working long hours and increasing pain   BP up      No Known Allergies  Current Outpatient Medications   Medication Sig Dispense Refill    venlafaxine (EFFEXOR XR) 150 MG extended release capsule TAKE ONE CAPSULE BY MOUTH DAILY 30 capsule 2    atorvastatin (LIPITOR) 20 MG tablet TAKE ONE TABLET BY MOUTH DAILY 90 tablet 1    dicyclomine (BENTYL) 10 MG capsule TAKE ONE CAPSULE BY MOUTH THREE TIMES A DAY AS NEEDED FOR ABDOMINAL BLOATING 90 capsule 3    turmeric 500 MG CAPS Take by mouth daily      B Complex Vitamins (VITAMIN B COMPLEX PO) Take by mouth      vitamin B-1 (THIAMINE) 100 MG tablet Take 100 mg by mouth daily      Glucosamine-Chondroitin (GLUCOSAMINE CHONDR COMPLEX PO) Take by mouth Take 2-3 by mouth every morning      buPROPion (WELLBUTRIN SR) 150 MG extended release tablet Take 1 tablet by mouth 2 times daily 180 tablet 3    Misc Natural Products (GLUCOS-CHONDROIT-MSM COMPLEX PO) Take by mouth daily       No current facility-administered medications for this visit. Social History     Socioeconomic History    Marital status:      Spouse name: Not on file    Number of children: Not on file    Years of education: Not on file    Highest education level: Not on file   Occupational History    Not on file   Tobacco Use    Smoking status: Former Smoker     Start date:      Quit date: 12/15/2021     Years since quittin.0    Smokeless tobacco: Never Used   Vaping Use    Vaping Use: Never used   Substance and Sexual Activity    Alcohol use: Yes     Comment: social drink.  Drug use: No    Sexual activity: Yes     Partners: Female   Other Topics Concern    Not on file   Social History Narrative    Not on file     Social Determinants of Health     Financial Resource Strain: Low Risk     Difficulty of Paying Living Expenses: Not hard at all   Food Insecurity: No Food Insecurity    Worried About Running Out of Food in the Last Year: Never true    [address] in the Last Year: Never true   Transportation Needs: No Transportation Needs    Lack of Transportation (Medical): No    Lack of Transportation (Non-Medical):  No   Physical Activity:     Days of Exercise per Week: Not on file    Minutes of Exercise per Session: Not on file   Stress:     Feeling of Stress : Not on file   Social Connections:     Frequency of Communication with Friends and Family: Not on file    Frequency of Social Gatherings with Friends and Family: Not on file    Attends Christian Services: Not on file    Active Member of [address] [place] [address] or Organizations: Not on file    Attends Club or Organization Meetings: Not on file    Marital Status: Not on file   Intimate Partner Violence:     Fear of Current or Ex-Partner: Not on file    Emotionally Abused: Not on file    Physically Abused: Not on file    Sexually Abused: Not on file   Housing Stability:     Unable to Pay for Housing in the Last Year: Not on file    Number of [place] in the Last Year: Not on file    Unstable Housing in the Last Year: Not on file      Past Medical History:   Diagnosis Date    Anxiety     Depression     Hyperlipidemia     Hypertension     borderline     Osteoarthritis     knees    Torn ACL 2003    left, right    Torn meniscus     left, right     Past Surgical History:   Procedure Laterality Date    COLONOSCOPY N/A 4/13/2021    COLONOSCOPY DIAGNOSTIC performed by Mima Steele MD at [address]      tooth extraction    KNEE ARTHROSCOPY Left      Family History   Problem Relation Age of Onset    Other Mother         Pulmonary embolism     Other Father         does not know     Cancer Maternal Grandfather         abdominal cancer of some kind    NO family hx of problems with anesthesia or bleeding/clotting disorders    Review of Systems   Constitutional: Positive for activity change and unexpected weight change (up slightly ). Negative for fever. No problems with anesthesia in patient or family  No known exposure to contagious or infectious diseases   HENT: Positive for nosebleeds (rare). Negative for congestion, dental problem (no loose teeth or caps in front   Does hav posterior dental problem ) and trouble swallowing. Eyes: Negative for visual disturbance. No glaucoma    Respiratory: Negative for apnea, cough and shortness of breath. No COPD or asthma or TB    Cardiovascular: Negative for chest pain, palpitations and leg swelling. No  Hx  Of MI, valvular heart disease, arrhythmia, rheumatic fever, or chf    Gastrointestinal: Negative for abdominal pain, blood in stool, constipation, diarrhea, nausea and vomiting. No hepatitis or jaundice,  No hx of ulcer disease   Endocrine:        No hx of thyroid disease or diabetes    Genitourinary: Negative for difficulty urinating and hematuria. No hx of kidney or bladder disease    Musculoskeletal: Positive for arthralgias and back pain (depends on activity ). Negative for gait problem and neck pain. No skeletal deformities or defects    Skin: Negative for rash and wound. Neurological: Negative for dizziness, tremors, seizures, syncope, speech difficulty, weakness, light-headedness, numbness and headaches. No hx of stroke or tia   No paralysis      Hematological: Does not bruise/bleed easily (no bleeding with previous surgeries  ). No hx of blood clots    Psychiatric/Behavioral: Positive for dysphoric mood (meds helping ). Negative for suicidal ideas.        OBJECTIVE:  BP (!) 152/86 (Site: Right Upper Arm, Position: Sitting, Cuff Size: Medium Adult)   Pulse 76   Temp 98.2 °F (36.8 °C) (Oral)   Ht 5' 7\" (1.702 m)   Wt 206 lb (93.4 kg)   SpO2 96%   BMI 32.26 kg/m²      Body mass index is 32.26 kg/m². Physical Exam  Constitutional:       General: He is not in acute distress. Appearance: Normal appearance. He is well-developed. HENT:      Head: Normocephalic and atraumatic. Right Ear: Tympanic membrane, ear canal and external ear normal.      Left Ear: Tympanic membrane, ear canal and external ear normal.      Nose: Nose normal.      Mouth/Throat:      Pharynx: Oropharynx is clear. Eyes:      Conjunctiva/sclera: Conjunctivae normal.   Neck:      Thyroid: No thyromegaly. Vascular: No carotid bruit or JVD. Trachea: No tracheal deviation. Cardiovascular:      Rate and Rhythm: Normal rate and regular rhythm. Heart sounds: Normal heart sounds. No murmur heard. No friction rub. No gallop. Pulmonary:      Effort: Pulmonary effort is normal.      Breath sounds: Normal breath sounds. Chest:      Chest wall: No tenderness. Abdominal:      General: Bowel sounds are normal. There is no distension. Palpations: Abdomen is soft. Tenderness: There is no abdominal tenderness. Comments: No  hsm    Genitourinary:     Penis: No tenderness. Musculoskeletal:         General: No swelling. Cervical back: Neck supple. Lymphadenopathy:      Cervical: No cervical adenopathy. Skin:     General: Skin is warm and dry. Neurological:      General: No focal deficit present. Mental Status: He is alert. Motor: No abnormal muscle tone. Gait: Gait normal.      Deep Tendon Reflexes: Reflexes are normal and symmetric. Psychiatric:         Mood and Affect: Mood normal.         Behavior: Behavior normal.         Thought Content:  Thought content normal.         ASSESSMENT/PLAN:    Lauren Mccormick was seen today for pre-op exam.    Diagnoses and all orders for this visit:    Pre-op examination EKG done    Told to check pre op orders and get labs requested by [facility]   Disability and fmla forms done  If needs more time will need to be through ortho    Labs reviewed  Normal kidney and electrolytes, blood count and protime      Primary osteoarthritis of left knee     non specific  ST-T wave ekg abnormalities new from 2014  No symptoms  Normal stress test     prediabetes and HGA1c 5.7     Pure hypercholesterolemia  Comments:  controlled     Borderline hypertension  Comments:  cut down salt and caffiene  Dash diet     Cleared for surgery     No orders of the defined types were placed in this encounter. Return in about 3 months (around 4/11/2022), or if symptoms worsen or fail to improve, for BP check . There are no Patient Instructions on file for this visit.

## 2022-01-12 ENCOUNTER — TELEPHONE (OUTPATIENT)
Dept: FAMILY MEDICINE CLINIC | Age: 57
End: 2022-01-12

## 2022-01-12 LAB
ANION GAP SERPL CALCULATED.3IONS-SCNC: 14 MMOL/L (ref 3–16)
APTT: 41.9 SEC (ref 26.2–38.6)
BASOPHILS ABSOLUTE: 0 K/UL (ref 0–0.2)
BASOPHILS RELATIVE PERCENT: 0.5 %
BUN BLDV-MCNC: 18 MG/DL (ref 7–20)
CALCIUM SERPL-MCNC: 9.4 MG/DL (ref 8.3–10.6)
CHLORIDE BLD-SCNC: 105 MMOL/L (ref 99–110)
CO2: 24 MMOL/L (ref 21–32)
CREAT SERPL-MCNC: 0.8 MG/DL (ref 0.9–1.3)
EOSINOPHILS ABSOLUTE: 0.2 K/UL (ref 0–0.6)
EOSINOPHILS RELATIVE PERCENT: 3.2 %
GFR AFRICAN AMERICAN: >60
GFR NON-AFRICAN AMERICAN: >60
GLUCOSE BLD-MCNC: 116 MG/DL (ref 70–99)
HCT VFR BLD CALC: 42.4 % (ref 40.5–52.5)
HEMOGLOBIN: 14.1 G/DL (ref 13.5–17.5)
INR BLD: 0.91 (ref 0.88–1.12)
LYMPHOCYTES ABSOLUTE: 1.5 K/UL (ref 1–5.1)
LYMPHOCYTES RELATIVE PERCENT: 22.8 %
MCH RBC QN AUTO: 30.9 PG (ref 26–34)
MCHC RBC AUTO-ENTMCNC: 33.3 G/DL (ref 31–36)
MCV RBC AUTO: 92.6 FL (ref 80–100)
MONOCYTES ABSOLUTE: 0.5 K/UL (ref 0–1.3)
MONOCYTES RELATIVE PERCENT: 6.8 %
NEUTROPHILS ABSOLUTE: 4.5 K/UL (ref 1.7–7.7)
NEUTROPHILS RELATIVE PERCENT: 66.7 %
PDW BLD-RTO: 15.1 % (ref 12.4–15.4)
PLATELET # BLD: 265 K/UL (ref 135–450)
PMV BLD AUTO: 7.9 FL (ref 5–10.5)
POTASSIUM SERPL-SCNC: 3.9 MMOL/L (ref 3.5–5.1)
PROTHROMBIN TIME: 10.2 SEC (ref 9.9–12.7)
RBC # BLD: 4.58 M/UL (ref 4.2–5.9)
SODIUM BLD-SCNC: 143 MMOL/L (ref 136–145)
WBC # BLD: 6.7 K/UL (ref 4–11)

## 2022-01-12 NOTE — TELEPHONE ENCOUNTER
----- Message from Opla Laguerre sent at 1/12/2022 10:04 AM EST -----  Subject: Message to Provider    QUESTIONS  Information for Provider? Pt will drop off paperwork this evening, that   the Dr requested. Antohny try to be there this after noon to get labs drawn. Thank you  ---------------------------------------------------------------------------  --------------  CALL BACK INFO  What is the best way for the office to contact you? OK to leave message on   voicemail  Preferred Call Back Phone Number? [phone]  ---------------------------------------------------------------------------  --------------  SCRIPT ANSWERS  Relationship to Patient?  Self

## 2022-01-13 LAB
ESTIMATED AVERAGE GLUCOSE: 116.9 MG/DL
HBA1C MFR BLD: 5.7 %

## 2022-01-17 ENCOUNTER — HOSPITAL ENCOUNTER (OUTPATIENT)
Dept: NON INVASIVE DIAGNOSTICS | Age: 57
Discharge: HOME | End: 2022-01-17
Payer: COMMERCIAL

## 2022-01-17 ENCOUNTER — TELEPHONE (OUTPATIENT)
Dept: FAMILY MEDICINE CLINIC | Age: 57
End: 2022-01-17

## 2022-01-17 DIAGNOSIS — R94.31 NONSPECIFIC ST-T WAVE ELECTROCARDIOGRAPHIC CHANGES: Primary | ICD-10-CM

## 2022-01-17 PROCEDURE — 93017 CV STRESS TEST TRACING ONLY: CPT

## 2022-01-17 NOTE — TELEPHONE ENCOUNTER
Per Dr Rona bender to order stress test STAT appt scheduled for tues 1/18/222 @ 9:00 am    Pt informed of new day and time

## 2022-01-17 NOTE — TELEPHONE ENCOUNTER
Pt is scheduled for a stress test today for his upcoming knee replacement. Pt had a normal EKG 5 years ago and this EKG came back abnormal.    Pt is needing a new order for   NUCLEAR MEDICINE STRESS TEST  This will need a PA done     Pt is needing to be scheduled this week, his surgery is on the 25th.      Latricia Warren at [facility]   [phone]

## 2022-01-18 ENCOUNTER — HOSPITAL ENCOUNTER (OUTPATIENT)
Dept: NON INVASIVE DIAGNOSTICS | Age: 57
Discharge: HOME | End: 2022-01-18
Payer: COMMERCIAL

## 2022-01-18 DIAGNOSIS — R94.31 NONSPECIFIC ST-T WAVE ELECTROCARDIOGRAPHIC CHANGES: ICD-10-CM

## 2022-01-18 LAB
LV EF: 58 %
LVEF MODALITY: NORMAL

## 2022-01-18 PROCEDURE — A9502 TC99M TETROFOSMIN: HCPCS | Performed by: INTERNAL MEDICINE

## 2022-01-18 PROCEDURE — 6360000002 HC RX W HCPCS: Performed by: INTERNAL MEDICINE

## 2022-01-18 PROCEDURE — 78452 HT MUSCLE IMAGE SPECT MULT: CPT

## 2022-01-18 PROCEDURE — 93017 CV STRESS TEST TRACING ONLY: CPT

## 2022-01-18 PROCEDURE — 3430000000 HC RX DIAGNOSTIC RADIOPHARMACEUTICAL: Performed by: INTERNAL MEDICINE

## 2022-01-18 RX ADMIN — TETROFOSMIN 30 MILLICURIE: 1.38 INJECTION, POWDER, LYOPHILIZED, FOR SOLUTION INTRAVENOUS at 09:51

## 2022-01-18 RX ADMIN — REGADENOSON 0.4 MG: 0.08 INJECTION, SOLUTION INTRAVENOUS at 10:06

## 2022-01-18 RX ADMIN — TETROFOSMIN 10 MILLICURIE: 1.38 INJECTION, POWDER, LYOPHILIZED, FOR SOLUTION INTRAVENOUS at 08:40

## 2022-01-19 ENCOUNTER — TELEPHONE (OUTPATIENT)
Dept: FAMILY MEDICINE CLINIC | Age: 57
End: 2022-01-19

## 2022-01-19 NOTE — TELEPHONE ENCOUNTER
----- Message from Josefina Gomez sent at 1/19/2022  3:06 PM EST -----  Subject: Message to Provider    QUESTIONS  Information for Provider? Roya Hdz from [facility] Dr [address] Fletcher   fax number, Is not on hippa.  # [phone]  ---------------------------------------------------------------------------  --------------  Osvaldo Danger INFO  What is the best way for the office to contact you? OK to leave message on   voicemail  Preferred Call Back Phone Number? [phone]  ---------------------------------------------------------------------------  --------------  SCRIPT ANSWERS  Relationship to Patient? Third Party  Representative Name?  Insurance company

## 2022-01-21 LAB — APTT: 41.4 SEC (ref 26.2–38.6)

## 2022-02-08 DIAGNOSIS — F41.9 ANXIETY AND DEPRESSION: ICD-10-CM

## 2022-02-08 DIAGNOSIS — F32.A ANXIETY AND DEPRESSION: ICD-10-CM

## 2022-02-08 RX ORDER — ALPRAZOLAM 0.5 MG/1
0.5 TABLET ORAL NIGHTLY PRN
Qty: 30 TABLET | Refills: 0 | Status: SHIPPED | OUTPATIENT
Start: 2022-02-08 | End: 2022-03-10

## 2022-03-27 DIAGNOSIS — F41.9 ANXIETY: Primary | ICD-10-CM

## 2022-03-28 RX ORDER — ALPRAZOLAM 0.5 MG/1
0.5 TABLET ORAL NIGHTLY PRN
Qty: 30 TABLET | Refills: 0 | Status: SHIPPED | OUTPATIENT
Start: 2022-03-28 | End: 2022-04-27

## 2022-04-05 ENCOUNTER — OFFICE VISIT (OUTPATIENT)
Dept: FAMILY MEDICINE CLINIC | Age: 57
End: 2022-04-05
Payer: COMMERCIAL

## 2022-04-05 VITALS
WEIGHT: 215 LBS | BODY MASS INDEX: 33.74 KG/M2 | HEART RATE: 91 BPM | DIASTOLIC BLOOD PRESSURE: 89 MMHG | HEIGHT: 67 IN | SYSTOLIC BLOOD PRESSURE: 147 MMHG | OXYGEN SATURATION: 98 % | TEMPERATURE: 98.6 F

## 2022-04-05 DIAGNOSIS — I10 PRIMARY HYPERTENSION: ICD-10-CM

## 2022-04-05 DIAGNOSIS — R61 NIGHT SWEATS: ICD-10-CM

## 2022-04-05 DIAGNOSIS — M17.12 PRIMARY OSTEOARTHRITIS OF LEFT KNEE: ICD-10-CM

## 2022-04-05 DIAGNOSIS — F32.A ANXIETY AND DEPRESSION: Primary | ICD-10-CM

## 2022-04-05 DIAGNOSIS — Z51.81 MEDICATION MONITORING ENCOUNTER: ICD-10-CM

## 2022-04-05 DIAGNOSIS — R35.0 URINARY FREQUENCY: ICD-10-CM

## 2022-04-05 DIAGNOSIS — F41.9 ANXIETY AND DEPRESSION: Primary | ICD-10-CM

## 2022-04-05 LAB
AMPHETAMINE SCREEN, URINE: ABNORMAL
BARBITURATE SCREEN URINE: ABNORMAL
BENZODIAZEPINE SCREEN, URINE: ABNORMAL
BILIRUBIN, POC: NEGATIVE
BLOOD URINE, POC: NEGATIVE
CANNABINOID SCREEN URINE: POSITIVE
CLARITY, POC: CLEAR
COCAINE METABOLITE SCREEN URINE: ABNORMAL
COLOR, POC: YELLOW
GLUCOSE URINE, POC: NEGATIVE
KETONES, POC: NORMAL
LEUKOCYTE EST, POC: NEGATIVE
Lab: ABNORMAL
METHADONE SCREEN, URINE: ABNORMAL
NITRITE, POC: NEGATIVE
OPIATE SCREEN URINE: ABNORMAL
OXYCODONE URINE: ABNORMAL
PH UA: 7
PH, POC: 7
PHENCYCLIDINE SCREEN URINE: ABNORMAL
PROPOXYPHENE SCREEN: ABNORMAL
PROTEIN, POC: NEGATIVE
SPECIFIC GRAVITY, POC: 1.02
UROBILINOGEN, POC: NORMAL

## 2022-04-05 PROCEDURE — G8427 DOCREV CUR MEDS BY ELIG CLIN: HCPCS | Performed by: INTERNAL MEDICINE

## 2022-04-05 PROCEDURE — 81002 URINALYSIS NONAUTO W/O SCOPE: CPT | Performed by: INTERNAL MEDICINE

## 2022-04-05 PROCEDURE — 99213 OFFICE O/P EST LOW 20 MIN: CPT | Performed by: INTERNAL MEDICINE

## 2022-04-05 PROCEDURE — 4004F PT TOBACCO SCREEN RCVD TLK: CPT | Performed by: INTERNAL MEDICINE

## 2022-04-05 PROCEDURE — G8417 CALC BMI ABV UP PARAM F/U: HCPCS | Performed by: INTERNAL MEDICINE

## 2022-04-05 PROCEDURE — 3017F COLORECTAL CA SCREEN DOC REV: CPT | Performed by: INTERNAL MEDICINE

## 2022-04-05 RX ORDER — NICOTINE POLACRILEX 2 MG
GUM BUCCAL
COMMUNITY

## 2022-04-05 RX ORDER — METOPROLOL SUCCINATE 50 MG/1
50 TABLET, EXTENDED RELEASE ORAL DAILY
Qty: 30 TABLET | Refills: 2 | Status: SHIPPED | OUTPATIENT
Start: 2022-04-05 | End: 2022-07-05

## 2022-04-05 SDOH — ECONOMIC STABILITY: FOOD INSECURITY: WITHIN THE PAST 12 MONTHS, THE FOOD YOU BOUGHT JUST DIDN'T LAST AND YOU DIDN'T HAVE MONEY TO GET MORE.: NEVER TRUE

## 2022-04-05 SDOH — ECONOMIC STABILITY: FOOD INSECURITY: WITHIN THE PAST 12 MONTHS, YOU WORRIED THAT YOUR FOOD WOULD RUN OUT BEFORE YOU GOT MONEY TO BUY MORE.: NEVER TRUE

## 2022-04-05 ASSESSMENT — PATIENT HEALTH QUESTIONNAIRE - PHQ9
10. IF YOU CHECKED OFF ANY PROBLEMS, HOW DIFFICULT HAVE THESE PROBLEMS MADE IT FOR YOU TO DO YOUR WORK, TAKE CARE OF THINGS AT HOME, OR GET ALONG WITH OTHER PEOPLE: 0
5. POOR APPETITE OR OVEREATING: 1
SUM OF ALL RESPONSES TO PHQ9 QUESTIONS 1 & 2: 0
SUM OF ALL RESPONSES TO PHQ QUESTIONS 1-9: 9
8. MOVING OR SPEAKING SO SLOWLY THAT OTHER PEOPLE COULD HAVE NOTICED. OR THE OPPOSITE, BEING SO FIGETY OR RESTLESS THAT YOU HAVE BEEN MOVING AROUND A LOT MORE THAN USUAL: 0
SUM OF ALL RESPONSES TO PHQ QUESTIONS 1-9: 9
7. TROUBLE CONCENTRATING ON THINGS, SUCH AS READING THE NEWSPAPER OR WATCHING TELEVISION: 0
SUM OF ALL RESPONSES TO PHQ QUESTIONS 1-9: 9
1. LITTLE INTEREST OR PLEASURE IN DOING THINGS: 0
SUM OF ALL RESPONSES TO PHQ QUESTIONS 1-9: 9
9. THOUGHTS THAT YOU WOULD BE BETTER OFF DEAD, OR OF HURTING YOURSELF: 0
6. FEELING BAD ABOUT YOURSELF - OR THAT YOU ARE A FAILURE OR HAVE LET YOURSELF OR YOUR FAMILY DOWN: 2
3. TROUBLE FALLING OR STAYING ASLEEP: 3
2. FEELING DOWN, DEPRESSED OR HOPELESS: 0
4. FEELING TIRED OR HAVING LITTLE ENERGY: 3

## 2022-04-05 ASSESSMENT — SOCIAL DETERMINANTS OF HEALTH (SDOH): HOW HARD IS IT FOR YOU TO PAY FOR THE VERY BASICS LIKE FOOD, HOUSING, MEDICAL CARE, AND HEATING?: NOT HARD AT ALL

## 2022-04-05 NOTE — PROGRESS NOTES
SUBJECTIVE:  Toyin Staples is a 60 y.o. male being evaluated for:    Chief Complaint   Patient presents with    Follow-up      Patient is here for [facility] followup today .  Sweats      Patient is having night sweats . HPI   Problems at home   Trouble with their autistic son  Left knee total knee some pain with increased activity and with weather changes we are having     Increased anxiety and depression    Night sweats    No Known Allergies  Current Outpatient Medications   Medication Sig Dispense Refill    Biotin 1 MG CAPS Take by mouth      metoprolol succinate (TOPROL XL) 50 MG extended release tablet Take 1 tablet by mouth daily 30 tablet 2    ALPRAZolam (XANAX) 0.5 MG tablet Take 1 tablet by mouth nightly as needed for Sleep for up to 30 days. 30 tablet 0    venlafaxine (EFFEXOR XR) 150 MG extended release capsule TAKE ONE CAPSULE BY MOUTH DAILY 30 capsule 2    atorvastatin (LIPITOR) 20 MG tablet TAKE ONE TABLET BY MOUTH DAILY 90 tablet 1    dicyclomine (BENTYL) 10 MG capsule TAKE ONE CAPSULE BY MOUTH THREE TIMES A DAY AS NEEDED FOR ABDOMINAL BLOATING 90 capsule 3    turmeric 500 MG CAPS Take by mouth daily      B Complex Vitamins (VITAMIN B COMPLEX PO) Take by mouth      vitamin B-1 (THIAMINE) 100 MG tablet Take 100 mg by mouth daily      Glucosamine-Chondroitin (GLUCOSAMINE CHONDR COMPLEX PO) Take by mouth Take 2-3 by mouth every morning      Misc Natural Products (GLUCOS-CHONDROIT-MSM COMPLEX PO) Take by mouth daily       No current facility-administered medications for this visit.          Social History     Socioeconomic History    Marital status:      Spouse name: Not on file    Number of children: Not on file    Years of education: Not on file    Highest education level: Not on file   Occupational History    Not on file   Tobacco Use    Smoking status: Current Some Day Smoker     Start date: 0     Last attempt to quit: 12/15/2021     Years since quittin.3    Smokeless tobacco: Never Used   Vaping Use    Vaping Use: Some days   Substance and Sexual Activity    Alcohol use: Yes     Comment: social drink.  Drug use: No    Sexual activity: Yes     Partners: Female   Other Topics Concern    Not on file   Social History Narrative    Not on file     Social Determinants of Health     Financial Resource Strain: Low Risk     Difficulty of Paying Living Expenses: Not hard at all   Food Insecurity: No Food Insecurity    Worried About Running Out of Food in the Last Year: Never true    [address] in the Last Year: Never true   Transportation Needs: No Transportation Needs    Lack of Transportation (Medical): No    Lack of Transportation (Non-Medical):  No   Physical Activity:     Days of Exercise per Week: Not on file    Minutes of Exercise per Session: Not on file   Stress:     Feeling of Stress : Not on file   Social Connections:     Frequency of Communication with Friends and Family: Not on file    Frequency of Social Gatherings with Friends and Family: Not on file    Attends [employer] Services: Not on file    Active Member of Clubs or Organizations: Not on file    Attends Club or Organization Meetings: Not on file    Marital Status: Not on file   Intimate Partner Violence:     Fear of Current or Ex-Partner: Not on file    Emotionally Abused: Not on file    Physically Abused: Not on file    Sexually Abused: Not on file   Housing Stability:     Unable to Pay for Housing in the Last Year: Not on file    Number of [place] in the Last Year: Not on file    Unstable Housing in the Last Year: Not on file      Past Medical History:   Diagnosis Date    Anxiety     Depression     Hyperlipidemia     Hypertension     borderline     Osteoarthritis     knees    Torn ACL 2003    left, right    Torn meniscus     left, right     Past Surgical History:   Procedure Laterality Date    COLONOSCOPY N/A 4/13/2021    COLONOSCOPY DIAGNOSTIC performed by Delbra Bloch Dougie Jacob MD at [address]      tooth extraction    KNEE ARTHROSCOPY Left        Review of Systems   Constitutional: Positive for diaphoresis and fatigue. Negative for activity change and unexpected weight change. HENT: Negative for nosebleeds. Respiratory: Negative for cough and shortness of breath. Cardiovascular: Negative for chest pain, palpitations and leg swelling. Gastrointestinal: Negative for abdominal pain, blood in stool, constipation, diarrhea, nausea and vomiting. No heart burn    Musculoskeletal: Positive for arthralgias. Neurological: Negative for dizziness, light-headedness and headaches. Psychiatric/Behavioral: Positive for dysphoric mood and sleep disturbance. Negative for agitation, behavioral problems, decreased concentration and suicidal ideas. The patient is nervous/anxious and is hyperactive. OBJECTIVE:  BP (!) 147/89 (Site: Left Upper Arm, Position: Sitting, Cuff Size: Medium Adult)   Pulse 91   Temp 98.6 °F (37 °C) (Oral)   Ht 5' 7\" (1.702 m)   Wt 215 lb (97.5 kg)   SpO2 98%   BMI 33.67 kg/m²      Body mass index is 33.67 kg/m². Physical Exam  Constitutional:       General: He is not in acute distress. Appearance: Normal appearance. He is well-developed. HENT:      Head: Normocephalic and atraumatic. Right Ear: External ear normal.      Left Ear: External ear normal.      Nose: Nose normal.      Mouth/Throat:      Pharynx: Oropharynx is clear. Eyes:      Conjunctiva/sclera: Conjunctivae normal.   Neck:      Thyroid: No thyromegaly. Vascular: No carotid bruit or JVD. Trachea: No tracheal deviation. Cardiovascular:      Rate and Rhythm: Normal rate and regular rhythm. Heart sounds: Normal heart sounds. No murmur heard. No friction rub. No gallop. Pulmonary:      Effort: Pulmonary effort is normal.      Breath sounds: Normal breath sounds. Chest:      Chest wall: No tenderness.    Abdominal: General: Bowel sounds are normal. There is no distension. Palpations: Abdomen is soft. Tenderness: There is no abdominal tenderness. Comments: No  hsm    Genitourinary:     Penis: No tenderness. Musculoskeletal:         General: No swelling or deformity. Cervical back: Neck supple. Lymphadenopathy:      Cervical: No cervical adenopathy. Skin:     General: Skin is warm and dry. Neurological:      General: No focal deficit present. Mental Status: He is alert. Motor: No abnormal muscle tone. Gait: Gait normal.      Deep Tendon Reflexes: Reflexes are normal and symmetric. Psychiatric:         Behavior: Behavior normal.         Thought Content: Thought content normal.         ASSESSMENT/PLAN:    Taylor Moreno was seen today for follow-up and sweats. Diagnoses and all orders for this visit:    Anxiety and depression does not want any medications  Dealing with it     Primary osteoarthritis of left knee      Primary hypertension controlled   -     Comprehensive Metabolic Panel; Future    Night sweats  -     Cancel: CBC with Auto Differential; Future  -     TSH; Future  -     CBC with Auto Differential; Future    Urinary frequency  -     POCT Urinalysis no Micro        Orders Placed This Encounter   Medications    metoprolol succinate (TOPROL XL) 50 MG extended release tablet     Sig: Take 1 tablet by mouth daily     Dispense:  30 tablet     Refill:  2        Return if symptoms worsen or fail to improve. There are no Patient Instructions on file for this visit.

## 2022-04-10 ASSESSMENT — ENCOUNTER SYMPTOMS
DIARRHEA: 0
SHORTNESS OF BREATH: 0
ABDOMINAL PAIN: 0
NAUSEA: 0
COUGH: 0
BLOOD IN STOOL: 0
VOMITING: 0
CONSTIPATION: 0

## 2022-04-15 ENCOUNTER — TELEPHONE (OUTPATIENT)
Dept: FAMILY MEDICINE CLINIC | Age: 57
End: 2022-04-15

## 2022-04-15 RX ORDER — VENLAFAXINE HYDROCHLORIDE 150 MG/1
CAPSULE, EXTENDED RELEASE ORAL
Qty: 90 CAPSULE | Refills: 1 | Status: SHIPPED | OUTPATIENT
Start: 2022-04-15 | End: 2022-07-07 | Stop reason: SDUPTHER

## 2022-04-15 NOTE — TELEPHONE ENCOUNTER
Patient was told by [facility] that they have been sending refill requests for Venlafaxine 150 mg, and we are not responding. Patient has not taken in 2 days. Please call when sent.   [phone]

## 2022-06-09 DIAGNOSIS — E78.00 PURE HYPERCHOLESTEROLEMIA: ICD-10-CM

## 2022-06-09 RX ORDER — ATORVASTATIN CALCIUM 20 MG/1
TABLET, FILM COATED ORAL
Qty: 30 TABLET | Refills: 3 | Status: SHIPPED | OUTPATIENT
Start: 2022-06-09 | End: 2022-07-07 | Stop reason: SDUPTHER

## 2022-06-25 DIAGNOSIS — F41.9 ANXIETY: Primary | ICD-10-CM

## 2022-06-27 RX ORDER — ALPRAZOLAM 0.5 MG/1
0.5 TABLET ORAL NIGHTLY PRN
Qty: 30 TABLET | Refills: 0 | Status: SHIPPED | OUTPATIENT
Start: 2022-06-27 | End: 2022-07-27

## 2022-07-05 RX ORDER — METOPROLOL SUCCINATE 50 MG/1
TABLET, EXTENDED RELEASE ORAL
Qty: 30 TABLET | Refills: 2 | Status: SHIPPED
Start: 2022-07-05 | End: 2022-07-07 | Stop reason: DRUGHIGH

## 2022-07-07 ENCOUNTER — OFFICE VISIT (OUTPATIENT)
Dept: FAMILY MEDICINE CLINIC | Age: 57
End: 2022-07-07
Payer: COMMERCIAL

## 2022-07-07 VITALS
SYSTOLIC BLOOD PRESSURE: 142 MMHG | OXYGEN SATURATION: 98 % | WEIGHT: 211 LBS | HEIGHT: 67 IN | HEART RATE: 100 BPM | BODY MASS INDEX: 33.12 KG/M2 | DIASTOLIC BLOOD PRESSURE: 73 MMHG | TEMPERATURE: 98.2 F

## 2022-07-07 DIAGNOSIS — M15.9 PRIMARY OSTEOARTHRITIS INVOLVING MULTIPLE JOINTS: ICD-10-CM

## 2022-07-07 DIAGNOSIS — I10 PRIMARY HYPERTENSION: ICD-10-CM

## 2022-07-07 DIAGNOSIS — F41.9 ANXIETY AND DEPRESSION: Primary | ICD-10-CM

## 2022-07-07 DIAGNOSIS — F32.A ANXIETY AND DEPRESSION: Primary | ICD-10-CM

## 2022-07-07 DIAGNOSIS — R73.03 PRE-DIABETES: ICD-10-CM

## 2022-07-07 DIAGNOSIS — Z51.81 MEDICATION MONITORING ENCOUNTER: ICD-10-CM

## 2022-07-07 DIAGNOSIS — M17.12 PRIMARY OSTEOARTHRITIS OF LEFT KNEE: ICD-10-CM

## 2022-07-07 DIAGNOSIS — E78.00 PURE HYPERCHOLESTEROLEMIA: ICD-10-CM

## 2022-07-07 PROCEDURE — 3017F COLORECTAL CA SCREEN DOC REV: CPT | Performed by: INTERNAL MEDICINE

## 2022-07-07 PROCEDURE — G8417 CALC BMI ABV UP PARAM F/U: HCPCS | Performed by: INTERNAL MEDICINE

## 2022-07-07 PROCEDURE — 4004F PT TOBACCO SCREEN RCVD TLK: CPT | Performed by: INTERNAL MEDICINE

## 2022-07-07 PROCEDURE — G8427 DOCREV CUR MEDS BY ELIG CLIN: HCPCS | Performed by: INTERNAL MEDICINE

## 2022-07-07 PROCEDURE — 99214 OFFICE O/P EST MOD 30 MIN: CPT | Performed by: INTERNAL MEDICINE

## 2022-07-07 RX ORDER — ATORVASTATIN CALCIUM 20 MG/1
20 TABLET, FILM COATED ORAL DAILY
Qty: 90 TABLET | Refills: 1 | Status: SHIPPED | OUTPATIENT
Start: 2022-07-07

## 2022-07-07 RX ORDER — VENLAFAXINE HYDROCHLORIDE 150 MG/1
CAPSULE, EXTENDED RELEASE ORAL
Qty: 90 CAPSULE | Refills: 1 | Status: SHIPPED | OUTPATIENT
Start: 2022-07-07

## 2022-07-07 RX ORDER — METOPROLOL SUCCINATE 100 MG/1
100 TABLET, EXTENDED RELEASE ORAL DAILY
Qty: 90 TABLET | Refills: 1 | Status: SHIPPED | OUTPATIENT
Start: 2022-07-07 | End: 2022-07-07 | Stop reason: SDUPTHER

## 2022-07-07 RX ORDER — METOPROLOL SUCCINATE 100 MG/1
100 TABLET, EXTENDED RELEASE ORAL DAILY
Qty: 90 TABLET | Refills: 1 | Status: SHIPPED | OUTPATIENT
Start: 2022-07-07

## 2022-07-07 NOTE — PROGRESS NOTES
SUBJECTIVE:  Neva Duque is a 60 y.o. male being evaluated for:    Chief Complaint   Patient presents with    3 Month Follow-Up      Patient is here for 3 months followup today . HPI   left knee is finally doing better after replacement  Right is hurting worse  Anxiety off amd pm   Out of his metoprolol Blood pressure at home running 135/80's  No headaches nose bleeds cp or dizziness    Uses CBD oil for his arthritis   No Known Allergies  Current Outpatient Medications   Medication Sig Dispense Refill    metoprolol succinate (TOPROL XL) 100 MG extended release tablet Take 1 tablet by mouth daily 90 tablet 1    atorvastatin (LIPITOR) 20 MG tablet Take 1 tablet by mouth daily 90 tablet 1    venlafaxine (EFFEXOR XR) 150 MG extended release capsule TAKE ONE CAPSULE BY MOUTH DAILY 90 capsule 1    ALPRAZolam (XANAX) 0.5 MG tablet Take 1 tablet by mouth nightly as needed for Sleep for up to 30 days. 30 tablet 0    Biotin 1 MG CAPS Take by mouth      dicyclomine (BENTYL) 10 MG capsule TAKE ONE CAPSULE BY MOUTH THREE TIMES A DAY AS NEEDED FOR ABDOMINAL BLOATING 90 capsule 3    B Complex Vitamins (VITAMIN B COMPLEX PO) Take by mouth      vitamin B-1 (THIAMINE) 100 MG tablet Take 100 mg by mouth daily      Glucosamine-Chondroitin (GLUCOSAMINE CHONDR COMPLEX PO) Take by mouth Take 2-3 by mouth every morning      Misc Natural Products (GLUCOS-CHONDROIT-MSM COMPLEX PO) Take by mouth daily       No current facility-administered medications for this visit.          Social History     Socioeconomic History    Marital status:      Spouse name: Not on file    Number of children: Not on file    Years of education: Not on file    Highest education level: Not on file   Occupational History    Not on file   Tobacco Use    Smoking status: Current Some Day Smoker     Packs/day: 0.25     Start date: 0     Last attempt to quit: 12/15/2021     Years since quittin.5    Smokeless tobacco: Never Used Vaping Use    Vaping Use: Some days   Substance and Sexual Activity    Alcohol use: Yes     Alcohol/week: 6.0 standard drinks     Types: 3 Cans of beer, 3 Shots of liquor per week     Comment: social drink.  Drug use: No    Sexual activity: Yes     Partners: Female   Other Topics Concern    Not on file   Social History Narrative    Not on file     Social Determinants of Health     Financial Resource Strain: Low Risk     Difficulty of Paying Living Expenses: Not hard at all   Food Insecurity: No Food Insecurity    Worried About Running Out of Food in the Last Year: Never true    [address] in the Last Year: Never true   Transportation Needs: No Transportation Needs    Lack of Transportation (Medical): No    Lack of Transportation (Non-Medical):  No   Physical Activity:     Days of Exercise per Week: Not on file    Minutes of Exercise per Session: Not on file   Stress:     Feeling of Stress : Not on file   Social Connections:     Frequency of Communication with Friends and Family: Not on file    Frequency of Social Gatherings with Friends and Family: Not on file    Attends Denominational Services: Not on file    Active Member of [address] [place] [address] or Organizations: Not on file    Attends Club or Organization Meetings: Not on file    Marital Status: Not on file   Intimate Partner Violence:     Fear of Current or Ex-Partner: Not on file    Emotionally Abused: Not on file    Physically Abused: Not on file    Sexually Abused: Not on file   Housing Stability:     Unable to Pay for Housing in the Last Year: Not on file    Number of [place] in the Last Year: Not on file    Unstable Housing in the Last Year: Not on file      Past Medical History:   Diagnosis Date    Anxiety     Depression     Hyperlipidemia     Hypertension     borderline     Osteoarthritis     knees    Torn ACL 2003    left, right    Torn meniscus     left, right     Past Surgical History:   Procedure Laterality Date    COLONOSCOPY N/A 4/13/2021    COLONOSCOPY DIAGNOSTIC performed by Zenaida Reddy MD at [address]      tooth extraction    KNEE ARTHROSCOPY Left        Review of Systems   Constitutional: Negative for activity change, diaphoresis, fatigue and unexpected weight change. HENT: Negative for nosebleeds. Respiratory: Negative for cough and shortness of breath. Cardiovascular: Negative for chest pain, palpitations and leg swelling. Gastrointestinal: Negative for abdominal pain, blood in stool, constipation, diarrhea, nausea and vomiting. No heart burn    Musculoskeletal: Positive for arthralgias. Neurological: Negative for dizziness, light-headedness and headaches. Psychiatric/Behavioral: Positive for dysphoric mood and sleep disturbance. Negative for agitation, behavioral problems, decreased concentration and suicidal ideas. The patient is nervous/anxious and is hyperactive. OBJECTIVE:  BP (!) 142/73 (Site: Left Upper Arm, Position: Sitting, Cuff Size: Medium Adult)   Pulse 100   Temp 98.2 °F (36.8 °C) (Oral)   Ht 5' 7\" (1.702 m)   Wt 211 lb (95.7 kg)   SpO2 98%   BMI 33.05 kg/m²      Body mass index is 33.05 kg/m². Physical Exam  Constitutional:       General: He is not in acute distress. Appearance: Normal appearance. He is well-developed. He is obese. HENT:      Head: Normocephalic and atraumatic. Eyes:      Conjunctiva/sclera: Conjunctivae normal.   Neck:      Thyroid: No thyromegaly. Vascular: No JVD. Trachea: No tracheal deviation. Cardiovascular:      Rate and Rhythm: Normal rate and regular rhythm. Heart sounds: Normal heart sounds. No murmur heard. No friction rub. No gallop. Pulmonary:      Effort: Pulmonary effort is normal.      Breath sounds: Normal breath sounds. Chest:      Chest wall: No tenderness. Abdominal:      General: Bowel sounds are normal. There is no distension. Palpations: Abdomen is soft. Tenderness: There is no abdominal tenderness. Comments: No  hsm    Genitourinary:     Penis: No tenderness. Musculoskeletal:         General: No swelling or deformity. Cervical back: Neck supple. Lymphadenopathy:      Cervical: No cervical adenopathy. Skin:     General: Skin is warm and dry. Neurological:      General: No focal deficit present. Mental Status: He is alert. Motor: No abnormal muscle tone. Gait: Gait normal.      Deep Tendon Reflexes: Reflexes are normal and symmetric. Psychiatric:         Behavior: Behavior normal.         Thought Content: Thought content normal.      Comments: Anxious          ASSESSMENT/PLAN:    Trini Peter was seen today for 3 month follow-up. Diagnoses and all orders for this visit:    Anxiety and depression xanax nighty and reviewed oarrs  Discussed cbd and positive marijuana screen   -     venlafaxine (EFFEXOR XR) 150 MG extended release capsule; TAKE ONE CAPSULE BY MOUTH DAILY    Pure hypercholesterolemia  -     atorvastatin (LIPITOR) 20 MG tablet; Take 1 tablet by mouth daily  -     Lipid Panel; Future  -     Comprehensive Metabolic Panel; Future    Primary hypertension increase dose  -     Comprehensive Metabolic Panel; Future  -     metoprolol succinate (TOPROL XL) 100 MG extended release tablet; Take 1 tablet by mouth daily    Primary osteoarthritis involving multiple joints  -     CBC; Future    Primary osteoarthritis of left knee  Doing therapy  Stopping CBD     Pre-diabetes low carb diet   -     Hemoglobin A1C; Future    Medication monitoring encounter  -     CBC;  Future      Orders Placed This Encounter   Medications    metoprolol succinate (TOPROL XL) 100 MG extended release tablet     Sig: Take 1 tablet by mouth daily     Dispense:  90 tablet     Refill:  1    atorvastatin (LIPITOR) 20 MG tablet     Sig: Take 1 tablet by mouth daily     Dispense:  90 tablet     Refill:  1    venlafaxine (EFFEXOR XR) 150 MG extended release capsule Sig: TAKE ONE CAPSULE BY MOUTH DAILY     Dispense:  90 capsule     Refill:  1        Return in about 3 months (around 10/7/2022), or if symptoms worsen or fail to improve. There are no Patient Instructions on file for this visit.

## 2022-07-10 ASSESSMENT — ENCOUNTER SYMPTOMS
VOMITING: 0
BLOOD IN STOOL: 0
ABDOMINAL PAIN: 0
DIARRHEA: 0
SHORTNESS OF BREATH: 0
COUGH: 0
NAUSEA: 0
CONSTIPATION: 0

## 2022-08-01 RX ORDER — DICYCLOMINE HYDROCHLORIDE 10 MG/1
CAPSULE ORAL
Qty: 90 CAPSULE | Refills: 3 | Status: SHIPPED | OUTPATIENT
Start: 2022-08-01

## 2022-08-20 DIAGNOSIS — F41.9 ANXIETY: Primary | ICD-10-CM

## 2022-08-22 RX ORDER — ALPRAZOLAM 0.5 MG/1
0.5 TABLET ORAL NIGHTLY PRN
Qty: 30 TABLET | Refills: 0 | Status: SHIPPED | OUTPATIENT
Start: 2022-08-22 | End: 2022-09-21

## 2022-09-26 DIAGNOSIS — F41.9 ANXIETY: Primary | ICD-10-CM

## 2022-09-26 RX ORDER — ALPRAZOLAM 0.5 MG/1
0.5 TABLET ORAL NIGHTLY PRN
Qty: 30 TABLET | Refills: 0 | Status: SHIPPED | OUTPATIENT
Start: 2022-09-26 | End: 2022-10-26

## 2022-10-14 DIAGNOSIS — E78.00 PURE HYPERCHOLESTEROLEMIA: ICD-10-CM

## 2022-10-14 DIAGNOSIS — R73.03 PRE-DIABETES: ICD-10-CM

## 2022-10-14 DIAGNOSIS — M15.9 PRIMARY OSTEOARTHRITIS INVOLVING MULTIPLE JOINTS: ICD-10-CM

## 2022-10-14 DIAGNOSIS — I10 PRIMARY HYPERTENSION: ICD-10-CM

## 2022-10-14 DIAGNOSIS — Z51.81 MEDICATION MONITORING ENCOUNTER: ICD-10-CM

## 2022-10-14 DIAGNOSIS — F41.9 ANXIETY: ICD-10-CM

## 2022-10-14 LAB
HCT VFR BLD CALC: 42.2 % (ref 40.5–52.5)
HEMOGLOBIN: 14.1 G/DL (ref 13.5–17.5)
MCH RBC QN AUTO: 30.6 PG (ref 26–34)
MCHC RBC AUTO-ENTMCNC: 33.3 G/DL (ref 31–36)
MCV RBC AUTO: 91.8 FL (ref 80–100)
PDW BLD-RTO: 14.1 % (ref 12.4–15.4)
PLATELET # BLD: 331 K/UL (ref 135–450)
PMV BLD AUTO: 7.8 FL (ref 5–10.5)
RBC # BLD: 4.6 M/UL (ref 4.2–5.9)
WBC # BLD: 6.1 K/UL (ref 4–11)

## 2022-10-14 RX ORDER — ALPRAZOLAM 0.5 MG/1
TABLET ORAL
Qty: 30 TABLET | OUTPATIENT
Start: 2022-10-14

## 2022-10-15 LAB
A/G RATIO: 1.4 (ref 1.1–2.2)
ALBUMIN SERPL-MCNC: 4.2 G/DL (ref 3.4–5)
ALP BLD-CCNC: 152 U/L (ref 40–129)
ALT SERPL-CCNC: 40 U/L (ref 10–40)
ANION GAP SERPL CALCULATED.3IONS-SCNC: 14 MMOL/L (ref 3–16)
AST SERPL-CCNC: 30 U/L (ref 15–37)
BILIRUB SERPL-MCNC: 0.4 MG/DL (ref 0–1)
BUN BLDV-MCNC: 10 MG/DL (ref 7–20)
CALCIUM SERPL-MCNC: 10.1 MG/DL (ref 8.3–10.6)
CHLORIDE BLD-SCNC: 104 MMOL/L (ref 99–110)
CHOLESTEROL, TOTAL: 188 MG/DL (ref 0–199)
CO2: 24 MMOL/L (ref 21–32)
CREAT SERPL-MCNC: 0.8 MG/DL (ref 0.9–1.3)
ESTIMATED AVERAGE GLUCOSE: 116.9 MG/DL
GFR AFRICAN AMERICAN: >60
GFR NON-AFRICAN AMERICAN: >60
GLUCOSE BLD-MCNC: 96 MG/DL (ref 70–99)
HBA1C MFR BLD: 5.7 %
HDLC SERPL-MCNC: 54 MG/DL (ref 40–60)
LDL CHOLESTEROL CALCULATED: 95 MG/DL
POTASSIUM SERPL-SCNC: 4.4 MMOL/L (ref 3.5–5.1)
SODIUM BLD-SCNC: 142 MMOL/L (ref 136–145)
TOTAL PROTEIN: 7.2 G/DL (ref 6.4–8.2)
TRIGL SERPL-MCNC: 194 MG/DL (ref 0–150)
VLDLC SERPL CALC-MCNC: 39 MG/DL

## 2022-10-17 ENCOUNTER — TELEPHONE (OUTPATIENT)
Dept: FAMILY MEDICINE CLINIC | Age: 57
End: 2022-10-17

## 2022-10-17 DIAGNOSIS — R74.8 ELEVATED ALKALINE PHOSPHATASE LEVEL: Primary | ICD-10-CM

## 2022-10-17 NOTE — TELEPHONE ENCOUNTER
----- Message from Nedra Mederos sent at 10/17/2022  8:12 AM EDT -----  Subject: Referral Request    Reason for referral request? patient is wanting to know if needs to do his   lab over since he had to reschedule to 11/29  Provider patient wants to be referred to(if known):     Provider Phone Number(if known):     Additional Information for Provider?   ---------------------------------------------------------------------------  --------------  [address]    [phone]; OK to leave message on voicemail  ---------------------------------------------------------------------------  --------------
Pt informed of results and to recheck the potassium level in 3 months.
Patient

## 2022-11-17 ENCOUNTER — APPOINTMENT (OUTPATIENT)
Dept: GENERAL RADIOLOGY | Age: 57
End: 2022-11-17
Payer: COMMERCIAL

## 2022-11-17 ENCOUNTER — HOSPITAL ENCOUNTER (EMERGENCY)
Age: 57
Discharge: HOME | End: 2022-11-17
Payer: COMMERCIAL

## 2022-11-17 VITALS
HEIGHT: 67 IN | BODY MASS INDEX: 30.07 KG/M2 | SYSTOLIC BLOOD PRESSURE: 159 MMHG | WEIGHT: 191.58 LBS | RESPIRATION RATE: 16 BRPM | TEMPERATURE: 97.9 F | DIASTOLIC BLOOD PRESSURE: 90 MMHG | HEART RATE: 65 BPM | OXYGEN SATURATION: 99 %

## 2022-11-17 DIAGNOSIS — S63.501A RIGHT WRIST SPRAIN, INITIAL ENCOUNTER: Primary | ICD-10-CM

## 2022-11-17 PROCEDURE — 99283 EMERGENCY DEPT VISIT LOW MDM: CPT

## 2022-11-17 PROCEDURE — 6370000000 HC RX 637 (ALT 250 FOR IP): Performed by: PHYSICIAN ASSISTANT

## 2022-11-17 PROCEDURE — 73110 X-RAY EXAM OF WRIST: CPT

## 2022-11-17 RX ORDER — IBUPROFEN 400 MG/1
800 TABLET ORAL ONCE
Status: COMPLETED | OUTPATIENT
Start: 2022-11-17 | End: 2022-11-17

## 2022-11-17 RX ORDER — IBUPROFEN 800 MG/1
800 TABLET ORAL EVERY 8 HOURS PRN
Qty: 20 TABLET | Refills: 0 | Status: SHIPPED | OUTPATIENT
Start: 2022-11-17

## 2022-11-17 RX ADMIN — IBUPROFEN 800 MG: 400 TABLET, FILM COATED ORAL at 15:55

## 2022-11-17 ASSESSMENT — PAIN SCALES - GENERAL
PAINLEVEL_OUTOF10: 10
PAINLEVEL_OUTOF10: 10

## 2022-11-17 ASSESSMENT — PAIN DESCRIPTION - PAIN TYPE: TYPE: ACUTE PAIN

## 2022-11-17 ASSESSMENT — PAIN DESCRIPTION - FREQUENCY: FREQUENCY: CONTINUOUS

## 2022-11-17 ASSESSMENT — PAIN DESCRIPTION - ORIENTATION: ORIENTATION: RIGHT

## 2022-11-17 ASSESSMENT — PAIN DESCRIPTION - LOCATION
LOCATION: WRIST
LOCATION: WRIST

## 2022-11-17 ASSESSMENT — PAIN - FUNCTIONAL ASSESSMENT: PAIN_FUNCTIONAL_ASSESSMENT: 0-10

## 2022-11-17 NOTE — ED NOTES
Patient ambulated to lobby to depart via private vehicle     Ricco Aden [facility]  11/17/22 9917

## 2022-11-17 NOTE — ED TRIAGE NOTES
Pt arrives ambulatory for eval of right wrist pain after mechanical fall Pt has positive pulse motion sensation to right hand. Pt is a/ox4, rsp nonlabored and pwd.

## 2022-11-17 NOTE — ED PROVIDER NOTES
[address] [facility]        Pt Name: Neeta Rowland  MRN: [id number]  [place] 1965  Date of evaluation: 11/17/2022  Provider: BRITTNY Rhoades  PCP: Saeid Zimmerman MD  Note Started: 2:57 PM EST     The ED Attending Physician was available for consultation but did not see or evaluate this patient. CHIEF COMPLAINT       Chief Complaint   Patient presents with    Wrist Injury     Pt arrives ambulatory for eval of right wrist pain after mechanical fall Pt has positive pulse motion sensation to right hand       HISTORY OF PRESENT ILLNESS   (Location, Timing/Onset, Context/Setting, Quality, Duration, Modifying Factors, Severity, Associated Signs and Symptoms)  Note limiting factors. Neeta Rowland is a 59 y.o. male who presents with complaint of injury to the right wrist, when he fell at work around 11:45 AM today. Patient says he stumbled over something on the floor, and landed with his right arm extended on the floor. Has had significant pain of the right wrist since then, and the pain is increasing, with increasing swelling as well. Says it hurts worse to try to move the wrist around. He denies numbness. Says he also fell on his right knee but that is not very painful and is moving the knee and ambulating normally. Says he is past history of fractures in the wrist, but no surgery. Denies head trauma or significant injury to any other parts of the body. Nursing Notes were all reviewed and agreed with or any disagreements were addressed in the HPI. REVIEW OF SYSTEMS    (2-9 systems for level 4, 10 or more for level 5)     Positives and pertinent negatives as per HPI.      PAST MEDICAL HISTORY     Past Medical History:   Diagnosis Date    Anxiety     Depression     Hyperlipidemia     Hypertension     borderline     Osteoarthritis     knees    Torn ACL 2003    left, right    Torn meniscus     left, right       SURGICAL HISTORY Past Surgical History:   Procedure Laterality Date    COLONOSCOPY N/A 2021    COLONOSCOPY DIAGNOSTIC performed by Sarah Mcdonough MD at [address] [place] [address]      tooth extraction    KNEE ARTHROSCOPY Left        CURRENTMEDICATIONS       Discharge Medication List as of 2022  3:46 PM        CONTINUE these medications which have NOT CHANGED    Details   dicyclomine (BENTYL) 10 MG capsule TAKE ONE CAPSULE BY MOUTH THREE TIMES A DAY AS NEEDED FOR ABDOMINAL BLOATING, Disp-90 capsule, R-3Normal      atorvastatin (LIPITOR) 20 MG tablet Take 1 tablet by mouth daily, Disp-90 tablet, R-1Normal      venlafaxine (EFFEXOR XR) 150 MG extended release capsule TAKE ONE CAPSULE BY MOUTH DAILY, Disp-90 capsule, R-1Normal      metoprolol succinate (TOPROL XL) 100 MG extended release tablet Take 1 tablet by mouth daily, Disp-90 tablet, R-1Normal      Biotin 1 MG CAPS Take by mouthHistorical Med      B Complex Vitamins (VITAMIN B COMPLEX PO) Take by mouthHistorical Med      vitamin B-1 (THIAMINE) 100 MG tablet Take 100 mg by mouth dailyHistorical Med      Glucosamine-Chondroitin (GLUCOSAMINE CHONDR COMPLEX PO) Take by mouth Take 2-3 by mouth every morningHistorical Med      Misc Natural Products (GLUCOS-CHONDROIT-MSM COMPLEX PO) Take by mouth daily             ALLERGIES     Patient has no known allergies. FAMILYHISTORY       Family History   Problem Relation Age of Onset    Other Mother         Pulmonary embolism     Other Father         does not know     Cancer Maternal Grandfather         abdominal cancer of some kind         SOCIAL HISTORY       Social History     Tobacco Use    Smoking status: Every Day     Packs/day: 0.25     Types: Cigarettes     Start date: 0     Last attempt to quit: 12/15/2021     Years since quittin.9    Smokeless tobacco: Never   Vaping Use    Vaping Use: Former   Substance Use Topics    Alcohol use:  Yes     Alcohol/week: 6.0 standard drinks     Types: 3 Cans of beer, 3 Shots of liquor per week     Comment: social drink. Drug use: No       SCREENINGS    [place] Coma Scale  Eye Opening: Spontaneous  Best Verbal Response: Oriented  Best Motor Response: Obeys commands  [place] Coma Scale Score: 15      PHYSICAL EXAM    (up to 7 for level 4, 8 or more for level 5)     ED Triage Vitals [11/17/22 1446]   BP Temp Temp src Heart Rate Resp SpO2 Height Weight   (!) 159/90 97.9 °F (36.6 °C) -- 65 16 99 % 5' 7\" (1.702 m) 191 lb 9.3 oz (86.9 kg)       Physical Exam  Vitals and nursing note reviewed. Constitutional:       General: He is not in acute distress. Appearance: Normal appearance. He is not ill-appearing. HENT:      Head: Normocephalic and atraumatic. Nose: Nose normal.   Eyes:      General:         Right eye: No discharge. Left eye: No discharge. Pulmonary:      Effort: Pulmonary effort is normal. No respiratory distress. Musculoskeletal:         General: Normal range of motion. Cervical back: Normal range of motion. Comments: There is mild swelling noted diffusely throughout the right wrist, with tenderness to palpation mostly on the dorsal side, negative for ecchymosis or laceration. Range of motion to the wrist significantly limited by pain. Normal examination of the hand, with no bony tenderness. 2+ radial pulse on the right. Sensation to light touch grossly intact and capillary refill <3 seconds in the digits of the right upper extremity. Skin:     General: Skin is warm and dry. Neurological:      General: No focal deficit present. Mental Status: He is alert and oriented to person, place, and time. Psychiatric:         Mood and Affect: Mood normal.         Behavior: Behavior normal.       DIAGNOSTIC RESULTS   LABS:    Labs Reviewed - No data to display    When ordered only abnormal lab results are displayed. All other labs were within normal range or not returned as of this dictation. EKG:  When ordered, EKG's are interpreted by the Emergency Department Physician in the absence of a cardiologist.  Please see their note for interpretation of EKG. RADIOLOGY:   All images such as plain radiographs, CT, Ultrasound and MRI are interpreted by a radiologist. Some images are visualized and preliminarily interpreted by me and/or the ED attending physician. Interpretation per the radiologist below, if available at the time of this note:    XR WRIST RIGHT (MIN 3 VIEWS)   Final Result   No acute osseous injury. CONSULTS:  None    PROCEDURES   Unless otherwise noted below, none. Procedures    EMERGENCY DEPARTMENT COURSE and DIFFERENTIAL DIAGNOSIS/MDM:   Vitals:    Vitals:    11/17/22 1446   BP: (!) 159/90   Pulse: 65   Resp: 16   Temp: 97.9 °F (36.6 °C)   SpO2: 99%   Weight: 191 lb 9.3 oz (86.9 kg)   Height: 5' 7\" (1.702 m)       Patient was given the following medications:  Medications   ibuprofen (ADVIL;MOTRIN) tablet 800 mg (800 mg Oral Given 11/17/22 1555)           Is this patient to be included in the SEP-1 Core Measure due to severe sepsis or septic shock? No   Exclusion criteria - the patient is NOT to be included for SEP-1 Core Measure due to: Infection is not suspected    Exam suggested a wrist sprain. No snuffbox tenderness. Good neurovascular status in the affected extremity. X-ray negative for any acute fracture, and patient was given a Velcro wrist splint in the ED. He will be discharged with prescription for anti-inflammatory medication and given Worker's Compensation follow-up instructions. He has an orthopedic doctor and we advised to follow-up with his doctor if symptoms do not improve after several days. The patient verbalized understanding and agreement with this plan of care. The patient was advised to return to the emergency department if symptoms should significantly worsen or if new and concerning symptoms should appear.      I estimate there is LOW risk for UNSTABLE FRACTURE, COMPARTMENT SYNDROME, DEEP VENOUS THROMBOSIS, SEPTIC ARTHRITIS, NEUROVASCULAR COMPROMISE, or TENDON/LIGAMENT RUPTURE, thus I consider the discharge disposition reasonable. CRITICAL CARE TIME   None    FINAL IMPRESSION      1.  Right wrist sprain, initial encounter          DISPOSITION/PLAN   DISPOSITION Decision To Discharge 11/17/2022 03:20:54 PM      PATIENT REFERRED TO:  *[address] [address] Doctor Jony Alvarado [place] [id number]  [phone]    Call   for worker's compensation follow-up    your orthopedic doctor    Go in 1 week  keep your scheduled appointment and discuss your wrist symptoms    DISCHARGE MEDICATIONS:  Discharge Medication List as of 11/17/2022  3:46 PM        START taking these medications    Details   ibuprofen (ADVIL;MOTRIN) 800 MG tablet Take 1 tablet by mouth every 8 hours as needed for Pain, Disp-20 tablet, R-0Normal             DISCONTINUED MEDICATIONS:  Discharge Medication List as of 11/17/2022  3:46 PM               (Please note that portions of this note were completed with a voice recognition program.  Efforts were made to edit the dictations but occasionally words are mis-transcribed.)    BRITTNY Mora (electronically signed)        Frandy Mora  11/17/22 1951

## 2022-11-18 DIAGNOSIS — F41.9 ANXIETY: Primary | ICD-10-CM

## 2022-11-18 RX ORDER — ALPRAZOLAM 0.5 MG/1
0.5 TABLET ORAL NIGHTLY PRN
Qty: 30 TABLET | Refills: 0 | Status: SHIPPED | OUTPATIENT
Start: 2022-11-18 | End: 2022-12-18

## 2022-11-29 ENCOUNTER — OFFICE VISIT (OUTPATIENT)
Dept: FAMILY MEDICINE CLINIC | Age: 57
End: 2022-11-29
Payer: COMMERCIAL

## 2022-11-29 VITALS
OXYGEN SATURATION: 98 % | SYSTOLIC BLOOD PRESSURE: 143 MMHG | TEMPERATURE: 97.8 F | HEIGHT: 67 IN | HEART RATE: 64 BPM | BODY MASS INDEX: 29.66 KG/M2 | WEIGHT: 189 LBS | DIASTOLIC BLOOD PRESSURE: 71 MMHG

## 2022-11-29 DIAGNOSIS — S66.911D MUSCLE STRAIN OF RIGHT WRIST, SUBSEQUENT ENCOUNTER: ICD-10-CM

## 2022-11-29 DIAGNOSIS — R73.03 PRE-DIABETES: ICD-10-CM

## 2022-11-29 DIAGNOSIS — F41.9 ANXIETY AND DEPRESSION: Primary | ICD-10-CM

## 2022-11-29 DIAGNOSIS — F32.A ANXIETY AND DEPRESSION: Primary | ICD-10-CM

## 2022-11-29 DIAGNOSIS — Z79.899 MEDICATION MANAGEMENT: ICD-10-CM

## 2022-11-29 DIAGNOSIS — I10 PRIMARY HYPERTENSION: ICD-10-CM

## 2022-11-29 DIAGNOSIS — E78.00 PURE HYPERCHOLESTEROLEMIA: ICD-10-CM

## 2022-11-29 PROCEDURE — G8427 DOCREV CUR MEDS BY ELIG CLIN: HCPCS | Performed by: INTERNAL MEDICINE

## 2022-11-29 PROCEDURE — 3074F SYST BP LT 130 MM HG: CPT | Performed by: INTERNAL MEDICINE

## 2022-11-29 PROCEDURE — G8417 CALC BMI ABV UP PARAM F/U: HCPCS | Performed by: INTERNAL MEDICINE

## 2022-11-29 PROCEDURE — G8484 FLU IMMUNIZE NO ADMIN: HCPCS | Performed by: INTERNAL MEDICINE

## 2022-11-29 PROCEDURE — 3078F DIAST BP <80 MM HG: CPT | Performed by: INTERNAL MEDICINE

## 2022-11-29 PROCEDURE — 99214 OFFICE O/P EST MOD 30 MIN: CPT | Performed by: INTERNAL MEDICINE

## 2022-11-29 PROCEDURE — 4004F PT TOBACCO SCREEN RCVD TLK: CPT | Performed by: INTERNAL MEDICINE

## 2022-11-29 PROCEDURE — 3017F COLORECTAL CA SCREEN DOC REV: CPT | Performed by: INTERNAL MEDICINE

## 2022-11-29 RX ORDER — VENLAFAXINE HYDROCHLORIDE 75 MG/1
75 CAPSULE, EXTENDED RELEASE ORAL DAILY
Qty: 30 CAPSULE | Refills: 3 | Status: SHIPPED
Start: 2022-11-29 | End: 2022-12-03 | Stop reason: SINTOL

## 2022-11-29 NOTE — PROGRESS NOTES
SUBJECTIVE:  Estuardo Hay is a 59 y.o. male being evaluated for:    Chief Complaint   Patient presents with    6 Month Follow-Up      Patient is here for 6 months follow up today . HPI   More problems with autistic son  Had to call the police  Physical altrications and breaking things at home  Changing his meds and doing a bit better  So increased stress  Otherwise doing well  Using more xanax  taking 2 at a time to work    Dental surgery with dentures and some trouble eating  Lost weight and trying to keep it off   Tripped and fell at work   Bad strain in his right wrist  no fracture    No Known Allergies  Current Outpatient Medications   Medication Sig Dispense Refill    ALPRAZolam (XANAX) 0.5 MG tablet Take 1 tablet by mouth nightly as needed for Sleep for up to 30 days. 30 tablet 0    ibuprofen (ADVIL;MOTRIN) 800 MG tablet Take 1 tablet by mouth every 8 hours as needed for Pain 20 tablet 0    dicyclomine (BENTYL) 10 MG capsule TAKE ONE CAPSULE BY MOUTH THREE TIMES A DAY AS NEEDED FOR ABDOMINAL BLOATING 90 capsule 3    atorvastatin (LIPITOR) 20 MG tablet Take 1 tablet by mouth daily 90 tablet 1    venlafaxine (EFFEXOR XR) 150 MG extended release capsule TAKE ONE CAPSULE BY MOUTH DAILY 90 capsule 1    metoprolol succinate (TOPROL XL) 100 MG extended release tablet Take 1 tablet by mouth daily 90 tablet 1    B Complex Vitamins (VITAMIN B COMPLEX PO) Take by mouth      vitamin B-1 (THIAMINE) 100 MG tablet Take 100 mg by mouth daily      Glucosamine-Chondroitin (GLUCOSAMINE CHONDR COMPLEX PO) Take by mouth Take 2-3 by mouth every morning      Misc Natural Products (GLUCOS-CHONDROIT-MSM COMPLEX PO) Take by mouth daily      Biotin 1 MG CAPS Take by mouth (Patient not taking: Reported on 11/29/2022)       No current facility-administered medications for this visit.          Social History     Socioeconomic History    Marital status:      Spouse name: Not on file    Number of children: Not on file    Years of education: Not on file    Highest education level: Not on file   Occupational History    Not on file   Tobacco Use    Smoking status: Every Day     Packs/day: 0.25     Types: Cigarettes     Start date: 0     Last attempt to quit: 12/15/2021     Years since quittin.9    Smokeless tobacco: Never   Vaping Use    Vaping Use: Former   Substance and Sexual Activity    Alcohol use: Yes     Alcohol/week: 6.0 standard drinks     Types: 3 Cans of beer, 3 Shots of liquor per week     Comment: social drink. Drug use: No    Sexual activity: Yes     Partners: Female   Other Topics Concern    Not on file   Social History Narrative    Not on file     Social Determinants of Health     Financial Resource Strain: Low Risk     Difficulty of Paying Living Expenses: Not hard at all   Food Insecurity: No Food Insecurity    Worried About [address] in the Last Year: Never true    [facility] in the Last Year: Never true   Transportation Needs: No Transportation Needs    Lack of Transportation (Medical): No    Lack of Transportation (Non-Medical): No   Physical Activity: Not on file   Stress: Not on file   Social Connections: Not on file   Intimate Partner Violence: Not on file   Housing Stability: Not on file      Past Medical History:   Diagnosis Date    Anxiety     Depression     Hyperlipidemia     Hypertension     borderline     Osteoarthritis     knees    Torn ACL     left, right    Torn meniscus     left, right     Past Surgical History:   Procedure Laterality Date    COLONOSCOPY N/A 2021    COLONOSCOPY DIAGNOSTIC performed by Fredis Hall MD at [address] [place] [address]      tooth extraction    KNEE ARTHROSCOPY Left        Review of Systems   Constitutional:  Positive for unexpected weight change. Negative for activity change, diaphoresis and fatigue. HENT:  Positive for dental problem. Negative for nosebleeds. Respiratory:  Negative for cough and shortness of breath. Cardiovascular:  Negative for chest pain, palpitations and leg swelling. Gastrointestinal:  Negative for abdominal pain, blood in stool, constipation, diarrhea, nausea and vomiting. No heart burn    Musculoskeletal:  Positive for arthralgias. Negative for neck pain. Neurological:  Negative for dizziness, light-headedness and headaches. Psychiatric/Behavioral:  Positive for dysphoric mood and sleep disturbance. Negative for agitation, behavioral problems, decreased concentration and suicidal ideas. The patient is nervous/anxious and is hyperactive. OBJECTIVE:  BP (!) 143/71 (Site: Left Upper Arm, Position: Sitting, Cuff Size: Medium Adult)   Pulse 64   Temp 97.8 °F (36.6 °C) (Oral)   Ht 5' 7\" (1.702 m)   Wt 189 lb (85.7 kg)   SpO2 98%   BMI 29.60 kg/m²      Body mass index is 29.6 kg/m². Physical Exam  Constitutional:       General: He is not in acute distress. Appearance: Normal appearance. He is well-developed. He is obese. HENT:      Head: Normocephalic and atraumatic. Eyes:      Conjunctiva/sclera: Conjunctivae normal.   Neck:      Thyroid: No thyromegaly. Vascular: No JVD. Trachea: No tracheal deviation. Cardiovascular:      Rate and Rhythm: Normal rate and regular rhythm. Heart sounds: Normal heart sounds. No murmur heard. No friction rub. No gallop. Pulmonary:      Effort: Pulmonary effort is normal.      Breath sounds: Normal breath sounds. Chest:      Chest wall: No tenderness. Abdominal:      General: There is no distension. Palpations: Abdomen is soft. Tenderness: There is no abdominal tenderness. Comments: No  hsm    Genitourinary:     Penis: No tenderness. Musculoskeletal:         General: Deformity present. No swelling. Cervical back: Neck supple. Comments: Right wrist in spint   Lymphadenopathy:      Cervical: No cervical adenopathy. Skin:     General: Skin is warm and dry.    Neurological:      General: No focal deficit present. Mental Status: He is alert. Motor: No abnormal muscle tone. Gait: Gait normal.      Deep Tendon Reflexes: Reflexes are normal and symmetric. Psychiatric:         Behavior: Behavior normal.         Thought Content: Thought content normal.      Comments: Anxious        ASSESSMENT/PLAN:    Nasim Robison was seen today for 6 month follow-up. Diagnoses and all orders for this visit:    Anxiety and depression  -     Cancel: Complete Urine Tox Screen; Future  -     Cancel: Complete Urine Tox Screen  -     DRUG SCREEN MULTI URINE        -     venlafaxine (EFFEXOR XR) 75 MG extended release capsule; Take 1 capsule by mouth daily    Pre-diabetes hga1c 5.7 continue low carb diet and weight loss    Pure hypercholesterolemia  good profile    Primary hypertension  Controlled    Muscle strain of right wrist, subsequent encounter  Comments:  in splint  following with workers comp    Medication management  -     Cancel: Complete Urine Tox Screen; Future  -     Cancel: Complete Urine Tox Screen  -     DRUG SCREEN MULTI URINE          Orders Placed This Encounter   Medications     venlafaxine (EFFEXOR XR) 75 MG extended release capsule     Sig: Take 1 capsule by mouth daily     Dispense:  30 capsule     Refill:  3        Return in about 3 months (around 2/28/2023), or if symptoms worsen or fail to improve, for hypertension and anxiety follow up . There are no Patient Instructions on file for this visit.

## 2022-11-30 LAB
AMPHETAMINE SCREEN, URINE: ABNORMAL
BARBITURATE SCREEN URINE: ABNORMAL
BENZODIAZEPINE SCREEN, URINE: POSITIVE
CANNABINOID SCREEN URINE: POSITIVE
COCAINE METABOLITE SCREEN URINE: ABNORMAL
FENTANYL SCREEN, URINE: POSITIVE
Lab: ABNORMAL
METHADONE SCREEN, URINE: ABNORMAL
OPIATE SCREEN URINE: ABNORMAL
OXYCODONE URINE: POSITIVE
PH UA: 6
PHENCYCLIDINE SCREEN URINE: ABNORMAL

## 2022-12-03 ENCOUNTER — TELEPHONE (OUTPATIENT)
Dept: FAMILY MEDICINE CLINIC | Age: 57
End: 2022-12-03

## 2022-12-03 ASSESSMENT — ENCOUNTER SYMPTOMS
ABDOMINAL PAIN: 0
DIARRHEA: 0
COUGH: 0
SHORTNESS OF BREATH: 0
BLOOD IN STOOL: 0
VOMITING: 0
CONSTIPATION: 0
NAUSEA: 0

## 2022-12-03 NOTE — TELEPHONE ENCOUNTER
Wife called  confused and falling asleep since increased effexor   Go back down on dose and if persisting to see   not sure cause

## 2022-12-04 ENCOUNTER — TELEPHONE (OUTPATIENT)
Dept: FAMILY MEDICINE CLINIC | Age: 57
End: 2022-12-04

## 2022-12-04 PROCEDURE — 99285 EMERGENCY DEPT VISIT HI MDM: CPT

## 2022-12-05 ENCOUNTER — APPOINTMENT (OUTPATIENT)
Dept: CT IMAGING | Age: 57
DRG: 176 | End: 2022-12-05
Payer: COMMERCIAL

## 2022-12-05 ENCOUNTER — APPOINTMENT (OUTPATIENT)
Dept: GENERAL RADIOLOGY | Age: 57
DRG: 176 | End: 2022-12-05
Payer: COMMERCIAL

## 2022-12-05 ENCOUNTER — HOSPITAL ENCOUNTER (INPATIENT)
Age: 57
LOS: 5 days | Discharge: HOME | DRG: 176 | End: 2022-12-10
Attending: EMERGENCY MEDICINE | Admitting: STUDENT IN AN ORGANIZED HEALTH CARE EDUCATION/TRAINING PROGRAM
Payer: COMMERCIAL

## 2022-12-05 DIAGNOSIS — R77.8 ELEVATED TROPONIN: ICD-10-CM

## 2022-12-05 DIAGNOSIS — R55 SYNCOPE AND COLLAPSE: Primary | ICD-10-CM

## 2022-12-05 PROBLEM — R79.89 ELEVATED TROPONIN: Status: ACTIVE | Noted: 2022-12-05

## 2022-12-05 PROBLEM — I10 HTN (HYPERTENSION): Status: ACTIVE | Noted: 2022-12-05

## 2022-12-05 LAB
ALBUMIN SERPL-MCNC: 3.5 G/DL (ref 3.4–5)
ALBUMIN SERPL-MCNC: 3.9 G/DL (ref 3.4–5)
ALP BLD-CCNC: 111 U/L (ref 40–129)
ALT SERPL-CCNC: 154 U/L (ref 10–40)
ANION GAP SERPL CALCULATED.3IONS-SCNC: 13 MMOL/L (ref 3–16)
ANION GAP SERPL CALCULATED.3IONS-SCNC: 8 MMOL/L (ref 3–16)
APTT: 31.1 SEC (ref 23–34.3)
APTT: 86.2 SEC (ref 23–34.3)
AST SERPL-CCNC: 167 U/L (ref 15–37)
BASOPHILS ABSOLUTE: 0 K/UL (ref 0–0.2)
BASOPHILS ABSOLUTE: 0 K/UL (ref 0–0.2)
BASOPHILS RELATIVE PERCENT: 0.2 %
BASOPHILS RELATIVE PERCENT: 0.4 %
BILIRUB SERPL-MCNC: 0.3 MG/DL (ref 0–1)
BILIRUBIN DIRECT: <0.2 MG/DL (ref 0–0.3)
BILIRUBIN, INDIRECT: ABNORMAL MG/DL (ref 0–1)
BUN BLDV-MCNC: 28 MG/DL (ref 7–20)
BUN BLDV-MCNC: 28 MG/DL (ref 7–20)
C-REACTIVE PROTEIN: 39.3 MG/L (ref 0–5.1)
CALCIUM SERPL-MCNC: 10 MG/DL (ref 8.3–10.6)
CALCIUM SERPL-MCNC: 8.6 MG/DL (ref 8.3–10.6)
CHLORIDE BLD-SCNC: 102 MMOL/L (ref 99–110)
CHLORIDE BLD-SCNC: 102 MMOL/L (ref 99–110)
CO2: 24 MMOL/L (ref 21–32)
CO2: 27 MMOL/L (ref 21–32)
CREAT SERPL-MCNC: 1 MG/DL (ref 0.9–1.3)
CREAT SERPL-MCNC: 1.3 MG/DL (ref 0.9–1.3)
D DIMER: 1.74 UG/ML FEU (ref 0–0.6)
EKG ATRIAL RATE: 60 BPM
EKG DIAGNOSIS: NORMAL
EKG P AXIS: 43 DEGREES
EKG P-R INTERVAL: 142 MS
EKG Q-T INTERVAL: 450 MS
EKG QRS DURATION: 100 MS
EKG QTC CALCULATION (BAZETT): 450 MS
EKG R AXIS: -42 DEGREES
EKG T AXIS: -57 DEGREES
EKG VENTRICULAR RATE: 60 BPM
EOSINOPHILS ABSOLUTE: 0 K/UL (ref 0–0.6)
EOSINOPHILS ABSOLUTE: 0 K/UL (ref 0–0.6)
EOSINOPHILS RELATIVE PERCENT: 0.2 %
EOSINOPHILS RELATIVE PERCENT: 0.7 %
GFR SERPL CREATININE-BSD FRML MDRD: >60 ML/MIN/{1.73_M2}
GFR SERPL CREATININE-BSD FRML MDRD: >60 ML/MIN/{1.73_M2}
GLUCOSE BLD-MCNC: 110 MG/DL (ref 70–99)
GLUCOSE BLD-MCNC: 94 MG/DL (ref 70–99)
HCT VFR BLD CALC: 35.4 % (ref 40.5–52.5)
HCT VFR BLD CALC: 42.5 % (ref 40.5–52.5)
HEMOGLOBIN: 11.5 G/DL (ref 13.5–17.5)
HEMOGLOBIN: 13.9 G/DL (ref 13.5–17.5)
INR BLD: 0.98 (ref 0.87–1.14)
LACTATE DEHYDROGENASE: 605 U/L (ref 100–190)
LYMPHOCYTES ABSOLUTE: 1.1 K/UL (ref 1–5.1)
LYMPHOCYTES ABSOLUTE: 1.3 K/UL (ref 1–5.1)
LYMPHOCYTES RELATIVE PERCENT: 10.6 %
LYMPHOCYTES RELATIVE PERCENT: 16.9 %
MAGNESIUM: 2.4 MG/DL (ref 1.8–2.4)
MCH RBC QN AUTO: 30.2 PG (ref 26–34)
MCH RBC QN AUTO: 30.2 PG (ref 26–34)
MCHC RBC AUTO-ENTMCNC: 32.5 G/DL (ref 31–36)
MCHC RBC AUTO-ENTMCNC: 32.8 G/DL (ref 31–36)
MCV RBC AUTO: 92.2 FL (ref 80–100)
MCV RBC AUTO: 92.7 FL (ref 80–100)
MONOCYTES ABSOLUTE: 0.6 K/UL (ref 0–1.3)
MONOCYTES ABSOLUTE: 0.6 K/UL (ref 0–1.3)
MONOCYTES RELATIVE PERCENT: 5.7 %
MONOCYTES RELATIVE PERCENT: 8.4 %
NEUTROPHILS ABSOLUTE: 5.5 K/UL (ref 1.7–7.7)
NEUTROPHILS ABSOLUTE: 8.6 K/UL (ref 1.7–7.7)
NEUTROPHILS RELATIVE PERCENT: 73.6 %
NEUTROPHILS RELATIVE PERCENT: 83.3 %
PDW BLD-RTO: 15.8 % (ref 12.4–15.4)
PDW BLD-RTO: 16.2 % (ref 12.4–15.4)
PHOSPHORUS: 3.2 MG/DL (ref 2.5–4.9)
PLATELET # BLD: 188 K/UL (ref 135–450)
PLATELET # BLD: 236 K/UL (ref 135–450)
PMV BLD AUTO: 7.6 FL (ref 5–10.5)
PMV BLD AUTO: 8 FL (ref 5–10.5)
POTASSIUM REFLEX MAGNESIUM: 4 MMOL/L (ref 3.5–5.1)
POTASSIUM SERPL-SCNC: 3.9 MMOL/L (ref 3.5–5.1)
PROTHROMBIN TIME: 12.9 SEC (ref 11.7–14.5)
RBC # BLD: 3.82 M/UL (ref 4.2–5.9)
RBC # BLD: 4.61 M/UL (ref 4.2–5.9)
SEDIMENTATION RATE, ERYTHROCYTE: 39 MM/HR (ref 0–20)
SODIUM BLD-SCNC: 134 MMOL/L (ref 136–145)
SODIUM BLD-SCNC: 142 MMOL/L (ref 136–145)
TOTAL PROTEIN: 6.5 G/DL (ref 6.4–8.2)
TROPONIN: 0.09 NG/ML
TROPONIN: 0.1 NG/ML
WBC # BLD: 10.3 K/UL (ref 4–11)
WBC # BLD: 7.5 K/UL (ref 4–11)

## 2022-12-05 PROCEDURE — 70450 CT HEAD/BRAIN W/O DYE: CPT

## 2022-12-05 PROCEDURE — 96374 THER/PROPH/DIAG INJ IV PUSH: CPT

## 2022-12-05 PROCEDURE — 6360000004 HC RX CONTRAST MEDICATION: Performed by: STUDENT IN AN ORGANIZED HEALTH CARE EDUCATION/TRAINING PROGRAM

## 2022-12-05 PROCEDURE — 80069 RENAL FUNCTION PANEL: CPT

## 2022-12-05 PROCEDURE — 86140 C-REACTIVE PROTEIN: CPT

## 2022-12-05 PROCEDURE — 85025 COMPLETE CBC W/AUTO DIFF WBC: CPT

## 2022-12-05 PROCEDURE — 6360000002 HC RX W HCPCS: Performed by: STUDENT IN AN ORGANIZED HEALTH CARE EDUCATION/TRAINING PROGRAM

## 2022-12-05 PROCEDURE — 93010 ELECTROCARDIOGRAM REPORT: CPT | Performed by: INTERNAL MEDICINE

## 2022-12-05 PROCEDURE — 85379 FIBRIN DEGRADATION QUANT: CPT

## 2022-12-05 PROCEDURE — 85610 PROTHROMBIN TIME: CPT

## 2022-12-05 PROCEDURE — 2500000003 HC RX 250 WO HCPCS: Performed by: INTERNAL MEDICINE

## 2022-12-05 PROCEDURE — 93005 ELECTROCARDIOGRAM TRACING: CPT | Performed by: EMERGENCY MEDICINE

## 2022-12-05 PROCEDURE — 6370000000 HC RX 637 (ALT 250 FOR IP): Performed by: EMERGENCY MEDICINE

## 2022-12-05 PROCEDURE — 6370000000 HC RX 637 (ALT 250 FOR IP): Performed by: STUDENT IN AN ORGANIZED HEALTH CARE EDUCATION/TRAINING PROGRAM

## 2022-12-05 PROCEDURE — 6360000002 HC RX W HCPCS: Performed by: INTERNAL MEDICINE

## 2022-12-05 PROCEDURE — 83615 LACTATE (LD) (LDH) ENZYME: CPT

## 2022-12-05 PROCEDURE — 71260 CT THORAX DX C+: CPT

## 2022-12-05 PROCEDURE — 71260 CT THORAX DX C+: CPT | Performed by: INTERNAL MEDICINE

## 2022-12-05 PROCEDURE — 83735 ASSAY OF MAGNESIUM: CPT

## 2022-12-05 PROCEDURE — 36415 COLL VENOUS BLD VENIPUNCTURE: CPT

## 2022-12-05 PROCEDURE — 71046 X-RAY EXAM CHEST 2 VIEWS: CPT

## 2022-12-05 PROCEDURE — 1200000000 HC SEMI PRIVATE

## 2022-12-05 PROCEDURE — 84484 ASSAY OF TROPONIN QUANT: CPT

## 2022-12-05 PROCEDURE — 85730 THROMBOPLASTIN TIME PARTIAL: CPT

## 2022-12-05 PROCEDURE — 6360000004 HC RX CONTRAST MEDICATION: Performed by: INTERNAL MEDICINE

## 2022-12-05 PROCEDURE — 99255 IP/OBS CONSLTJ NEW/EST HI 80: CPT | Performed by: INTERNAL MEDICINE

## 2022-12-05 PROCEDURE — 80076 HEPATIC FUNCTION PANEL: CPT

## 2022-12-05 PROCEDURE — 2580000003 HC RX 258: Performed by: STUDENT IN AN ORGANIZED HEALTH CARE EDUCATION/TRAINING PROGRAM

## 2022-12-05 PROCEDURE — 6370000000 HC RX 637 (ALT 250 FOR IP): Performed by: INTERNAL MEDICINE

## 2022-12-05 PROCEDURE — 85652 RBC SED RATE AUTOMATED: CPT

## 2022-12-05 RX ORDER — HEPARIN SODIUM 1000 [USP'U]/ML
3500 INJECTION, SOLUTION INTRAVENOUS; SUBCUTANEOUS ONCE
Status: COMPLETED | OUTPATIENT
Start: 2022-12-05 | End: 2022-12-05

## 2022-12-05 RX ORDER — ACETAMINOPHEN 650 MG/1
650 SUPPOSITORY RECTAL EVERY 6 HOURS PRN
Status: DISCONTINUED | OUTPATIENT
Start: 2022-12-05 | End: 2022-12-10 | Stop reason: HOSPADM

## 2022-12-05 RX ORDER — NICOTINE 21 MG/24HR
1 PATCH, TRANSDERMAL 24 HOURS TRANSDERMAL DAILY
Status: DISCONTINUED | OUTPATIENT
Start: 2022-12-05 | End: 2022-12-10 | Stop reason: HOSPADM

## 2022-12-05 RX ORDER — ALPRAZOLAM 0.5 MG/1
0.5 TABLET ORAL NIGHTLY PRN
Status: DISCONTINUED | OUTPATIENT
Start: 2022-12-05 | End: 2022-12-06 | Stop reason: SDUPTHER

## 2022-12-05 RX ORDER — ENOXAPARIN SODIUM 100 MG/ML
40 INJECTION SUBCUTANEOUS DAILY
Status: DISCONTINUED | OUTPATIENT
Start: 2022-12-05 | End: 2022-12-05

## 2022-12-05 RX ORDER — 0.9 % SODIUM CHLORIDE 0.9 %
500 INTRAVENOUS SOLUTION INTRAVENOUS ONCE
Status: COMPLETED | OUTPATIENT
Start: 2022-12-05 | End: 2022-12-05

## 2022-12-05 RX ORDER — ASPIRIN 81 MG/1
81 TABLET, CHEWABLE ORAL DAILY
Status: DISCONTINUED | OUTPATIENT
Start: 2022-12-06 | End: 2022-12-07

## 2022-12-05 RX ORDER — ONDANSETRON 2 MG/ML
4 INJECTION INTRAMUSCULAR; INTRAVENOUS EVERY 6 HOURS PRN
Status: DISCONTINUED | OUTPATIENT
Start: 2022-12-05 | End: 2022-12-10 | Stop reason: HOSPADM

## 2022-12-05 RX ORDER — HEPARIN SODIUM 10000 [USP'U]/100ML
0-4000 INJECTION, SOLUTION INTRAVENOUS CONTINUOUS
Status: DISCONTINUED | OUTPATIENT
Start: 2022-12-05 | End: 2022-12-07

## 2022-12-05 RX ORDER — SODIUM CHLORIDE 9 MG/ML
INJECTION, SOLUTION INTRAVENOUS CONTINUOUS
Status: DISCONTINUED | OUTPATIENT
Start: 2022-12-05 | End: 2022-12-06

## 2022-12-05 RX ORDER — HEPARIN SODIUM 1000 [USP'U]/ML
40 INJECTION, SOLUTION INTRAVENOUS; SUBCUTANEOUS PRN
Status: DISCONTINUED | OUTPATIENT
Start: 2022-12-05 | End: 2022-12-05

## 2022-12-05 RX ORDER — METOPROLOL SUCCINATE 50 MG/1
100 TABLET, EXTENDED RELEASE ORAL ONCE
Status: COMPLETED | OUTPATIENT
Start: 2022-12-05 | End: 2022-12-05

## 2022-12-05 RX ORDER — ACETAMINOPHEN 325 MG/1
650 TABLET ORAL EVERY 6 HOURS PRN
Status: DISCONTINUED | OUTPATIENT
Start: 2022-12-05 | End: 2022-12-10 | Stop reason: HOSPADM

## 2022-12-05 RX ORDER — HEPARIN SODIUM 1000 [USP'U]/ML
80 INJECTION, SOLUTION INTRAVENOUS; SUBCUTANEOUS PRN
Status: DISCONTINUED | OUTPATIENT
Start: 2022-12-05 | End: 2022-12-05

## 2022-12-05 RX ORDER — GAUZE BANDAGE 2" X 2"
100 BANDAGE TOPICAL DAILY
Status: DISCONTINUED | OUTPATIENT
Start: 2022-12-05 | End: 2022-12-10 | Stop reason: HOSPADM

## 2022-12-05 RX ORDER — LISINOPRIL 5 MG/1
5 TABLET ORAL DAILY
Status: DISCONTINUED | OUTPATIENT
Start: 2022-12-06 | End: 2022-12-06

## 2022-12-05 RX ORDER — SODIUM CHLORIDE 0.9 % (FLUSH) 0.9 %
5-40 SYRINGE (ML) INJECTION EVERY 12 HOURS SCHEDULED
Status: DISCONTINUED | OUTPATIENT
Start: 2022-12-05 | End: 2022-12-10 | Stop reason: HOSPADM

## 2022-12-05 RX ORDER — HYDRALAZINE HYDROCHLORIDE 20 MG/ML
5 INJECTION INTRAMUSCULAR; INTRAVENOUS ONCE
Status: COMPLETED | OUTPATIENT
Start: 2022-12-05 | End: 2022-12-05

## 2022-12-05 RX ORDER — SODIUM CHLORIDE 9 MG/ML
INJECTION, SOLUTION INTRAVENOUS PRN
Status: DISCONTINUED | OUTPATIENT
Start: 2022-12-05 | End: 2022-12-10 | Stop reason: HOSPADM

## 2022-12-05 RX ORDER — HYDRALAZINE HYDROCHLORIDE 20 MG/ML
10 INJECTION INTRAMUSCULAR; INTRAVENOUS EVERY 6 HOURS PRN
Status: DISCONTINUED | OUTPATIENT
Start: 2022-12-05 | End: 2022-12-10 | Stop reason: HOSPADM

## 2022-12-05 RX ORDER — METOPROLOL SUCCINATE 50 MG/1
100 TABLET, EXTENDED RELEASE ORAL DAILY
Status: DISCONTINUED | OUTPATIENT
Start: 2022-12-05 | End: 2022-12-05

## 2022-12-05 RX ORDER — HEPARIN SODIUM 1000 [USP'U]/ML
80 INJECTION, SOLUTION INTRAVENOUS; SUBCUTANEOUS ONCE
Status: DISCONTINUED | OUTPATIENT
Start: 2022-12-05 | End: 2022-12-05

## 2022-12-05 RX ORDER — SODIUM CHLORIDE 0.9 % (FLUSH) 0.9 %
5-40 SYRINGE (ML) INJECTION PRN
Status: DISCONTINUED | OUTPATIENT
Start: 2022-12-05 | End: 2022-12-10 | Stop reason: HOSPADM

## 2022-12-05 RX ORDER — ATORVASTATIN CALCIUM 80 MG/1
80 TABLET, FILM COATED ORAL NIGHTLY
Status: DISCONTINUED | OUTPATIENT
Start: 2022-12-05 | End: 2022-12-10 | Stop reason: HOSPADM

## 2022-12-05 RX ORDER — LISINOPRIL 5 MG/1
5 TABLET ORAL DAILY
Status: DISCONTINUED | OUTPATIENT
Start: 2022-12-05 | End: 2022-12-05

## 2022-12-05 RX ADMIN — LISINOPRIL 5 MG: 5 TABLET ORAL at 09:56

## 2022-12-05 RX ADMIN — SODIUM CHLORIDE 500 ML: 9 INJECTION, SOLUTION INTRAVENOUS at 04:39

## 2022-12-05 RX ADMIN — ATORVASTATIN CALCIUM 80 MG: 80 TABLET, FILM COATED ORAL at 20:03

## 2022-12-05 RX ADMIN — IOPAMIDOL 75 ML: 755 INJECTION, SOLUTION INTRAVENOUS at 09:31

## 2022-12-05 RX ADMIN — IOPAMIDOL 75 ML: 755 INJECTION, SOLUTION INTRAVENOUS at 05:28

## 2022-12-05 RX ADMIN — ENOXAPARIN SODIUM 40 MG: 100 INJECTION SUBCUTANEOUS at 09:57

## 2022-12-05 RX ADMIN — HEPARIN SODIUM 1580 UNITS/HR: 10000 INJECTION, SOLUTION INTRAVENOUS at 11:46

## 2022-12-05 RX ADMIN — ONDANSETRON 4 MG: 2 INJECTION INTRAMUSCULAR; INTRAVENOUS at 14:35

## 2022-12-05 RX ADMIN — HYDRALAZINE HYDROCHLORIDE 5 MG: 20 INJECTION INTRAMUSCULAR; INTRAVENOUS at 04:20

## 2022-12-05 RX ADMIN — SODIUM CHLORIDE, PRESERVATIVE FREE 10 ML: 5 INJECTION INTRAVENOUS at 10:07

## 2022-12-05 RX ADMIN — Medication 100 MG: at 09:56

## 2022-12-05 RX ADMIN — SODIUM CHLORIDE: 9 INJECTION, SOLUTION INTRAVENOUS at 04:36

## 2022-12-05 RX ADMIN — HEPARIN SODIUM 3500 UNITS: 1000 INJECTION INTRAVENOUS; SUBCUTANEOUS at 11:40

## 2022-12-05 RX ADMIN — METOPROLOL SUCCINATE 100 MG: 50 TABLET, EXTENDED RELEASE ORAL at 02:40

## 2022-12-05 ASSESSMENT — PAIN - FUNCTIONAL ASSESSMENT: PAIN_FUNCTIONAL_ASSESSMENT: 0-10

## 2022-12-05 ASSESSMENT — PAIN SCALES - GENERAL: PAINLEVEL_OUTOF10: 0

## 2022-12-05 NOTE — PROGRESS NOTES
Clinical Pharmacy Note  Heparin Dosing Consult    Jocelyn Cunningham is a 59 y.o. male ordered heparin per high dose nomogram by Dr. Girma Tan. Lab Results   Component Value Date/Time    APTT 31.1 12/05/2022 04:15 AM     Lab Results   Component Value Date/Time    HGB 11.5 12/05/2022 05:59 AM    HCT 35.4 12/05/2022 05:59 AM     12/05/2022 05:59 AM    INR 0.98 12/05/2022 04:15 AM       Ht Readings from Last 1 Encounters:   11/29/22 5' 7\" (1.702 m)        Wt Readings from Last 1 Encounters:   12/05/22 194 lb 0.1 oz (88 kg)        Assessment/Plan:  Initial bolus: 3500 units  Initial infusion rate: 1580 units/hr  Next aPTT: 1800 12/5/22    Pharmacy will continue to monitor adjust heparin based on aPTT results using nomogram below:     VTE/DVT/PE Heparin Nomogram     Initial Bolus: 80 units/kg Max Bolus: 10,000 units       Initial Rate: 18 units/kg/hr Max Initial Rate: 2,100 units/hr     aPTT < 59    Heparin 80 units/kg bolus Increase infusion by 4 units/kg/hr        (maximum 10,000 units)   aPTT 59-72.9    Heparin 40 units/kg bolus Increase infusion by 2 units/kg/hr        (maximum 5,000 units)   aPTT      No bolus   No change   aPTT 102.1-109  No bolus   Decrease infusion by 1 units/kg/hr   aPTT 109.1-122.9   No bolus   Decrease infusion by 2 units/kg/hr   aPTT > 123     Hold heparin for 1 hour Decrease infusion by 3 units/kg/hr    Obtain aPTT 6 hours after initial bolus and 6 hours after any dose change until two consecutive therapeutic aPTTs are achieved - then daily.

## 2022-12-05 NOTE — PROGRESS NOTES
Medication Reconciliation    List of medications patient is currently taking is complete. Source of information: 1. Conversation with patient's caregiver at bedside                                      2. EPIC records      Allergies  Patient has no known allergies. Notes regarding home medications:   1. Patient reports taking all AM doses prior to showing up to the ED yesterday  2. Venlafaxine was increased to 225mg daily on 11/29. Patient reports increased falling after increasing dose, so he stopped taking the venlafaxine altogether 3 days ago.   3. Patient states that he took 1 oxycodone 5mg tablet from previous dental prescription when he fell and hurt his wrist.      Wes Mcduffie [facility]   12/5/2022  11:35 AM

## 2022-12-05 NOTE — ED NOTES
Report received from [facility]. All questions answered.      Julia Chamberlain RN  12/05/22 [id number]

## 2022-12-05 NOTE — ED NOTES
ED SBAR report provider to [facility]. Patient to be transported to Room 4108 via stretcher by transport tech. Patient transported with bedside cardiac monitor and with IV medications infusing. IV site clean, dry, and intact. MEWS score and pain assessed and documented. Updated patient and family on plan of care.      Trilby Shone, RN  12/05/22 [id number]

## 2022-12-05 NOTE — H&P
Hospital Medicine History & Physical      PCP: Karen Fleming MD    Date of Admission: 12/5/2022    Date of Service: Pt seen/examined on 12/5/2022 and admitted to inpatient    Chief Complaint: Syncopal episode, weight loss, possibly slight slurred speech      History Of Present Illness: The patient is a 59 y.o. male with past [place] as below who presents to [facility] with concern per himself and the family that over the last few days he has had 2 episodes where he has apparently passed out once when he was waiting at a [employer] and apparently it took him about 5 hours to wake up to answer his wife's phone call while he was parked and apparently a second time while at home. Both these episodes however were unwitnessed so uncertain as to the etiology patient does not remember what exactly happened. He apparently notes that he recently traveled from [facility] back home after his family vacation and that since that time he also lost about 10 pounds and has been somewhat increasingly fatigued to the point where he does stumble at times when walking according to his son who is at bedside. Son is also mention that apparently has had some slight intermittent slurred speech but it seems very difficult to detect at times. According to the patient he denies any drug use although he does smoke. He denies any other recent other symptoms of fever, chills, dizziness, chest pain, shortness of breath, dysuria, blood in urine/stool/sputum, nausea/vomiting/diarrhea/abdominal pain. He apparently did just recently start venlafaxine this past 4 days ago for depression but he has never had symptoms from any of his medications recently such as this.   He does take metoprolol  mg apparently for hypertension he notes that he has been taking this for a little while but he does not take any other medications for hypertension. He denies that he has ever had any previous cardiac issues in the past.  He denies any history of cancer in himself although there might be a history of cancer in one of his family members but he does not member his full family history. Past Medical History:        Diagnosis Date    Anxiety     Depression     Hyperlipidemia     Hypertension     borderline     Osteoarthritis     knees    Torn ACL 2003    left, right    Torn meniscus     left, right       Past Surgical History:        Procedure Laterality Date    COLONOSCOPY N/A 4/13/2021    COLONOSCOPY DIAGNOSTIC performed by Darling Harris MD at [address] [place] [address]      tooth extraction    KNEE ARTHROSCOPY Left        Medications Prior to Admission:    Prior to Admission medications    Medication Sig Start Date End Date Taking? Authorizing Provider   ALPRAZolam Girish Aldrich) 0.5 MG tablet Take 1 tablet by mouth nightly as needed for Sleep for up to 30 days.  11/18/22 12/18/22  Tj Casillas MD   ibuprofen (ADVIL;MOTRIN) 800 MG tablet Take 1 tablet by mouth every 8 hours as needed for Pain 11/17/22   BRITTNY Conway   dicyclomine (BENTYL) 10 MG capsule TAKE ONE CAPSULE BY MOUTH THREE TIMES A DAY AS NEEDED FOR ABDOMINAL BLOATING 8/1/22   ROXANA Martin NP   atorvastatin (LIPITOR) 20 MG tablet Take 1 tablet by mouth daily 7/7/22   Tj Casillas MD   venlafaxine (EFFEXOR XR) 150 MG extended release capsule TAKE ONE CAPSULE BY MOUTH DAILY 7/7/22   Tj Casillas MD   metoprolol succinate (TOPROL XL) 100 MG extended release tablet Take 1 tablet by mouth daily 7/7/22   Tj Casillas MD   Biotin 1 MG CAPS Take by mouth  Patient not taking: Reported on 11/29/2022    Historical Provider, MD   B Complex Vitamins (VITAMIN B COMPLEX PO) Take by mouth    Historical Provider, MD   vitamin B-1 (THIAMINE) 100 MG tablet Take 100 mg by mouth daily    Historical Provider, MD Glucosamine-Chondroitin (GLUCOSAMINE CHONDR COMPLEX PO) Take by mouth Take 2-3 by mouth every morning    Historical Provider, MD   Misc Natural Products (GLUCOS-CHONDROIT-MSM COMPLEX PO) Take by mouth daily    Historical Provider, MD       Allergies:  Patient has no known allergies. Social History:  The patient currently lives home    TOBACCO:   reports that he has been smoking cigarettes. He started smoking about 41 years ago. He has been smoking an average of 1 pack per day. He has never used smokeless tobacco.  ETOH:   reports current alcohol use of about 6.0 standard drinks per week. Family History:  Reviewed in detail and negative for DM, Early CAD, Cancer, CVA. Positive as follows:        Problem Relation Age of Onset    Other Mother         Pulmonary embolism     Other Father         does not know     Cancer Maternal Grandfather         abdominal cancer of some kind        REVIEW OF SYSTEMS:    and as noted in the HPI. All other systems reviewed and negative. PHYSICAL EXAM:    BP (!) 154/90   Pulse 50   Temp 98.2 °F (36.8 °C) (Oral)   Resp 17   Wt 194 lb 0.1 oz (88 kg)   SpO2 95%   BMI 30.39 kg/m²     General appearance: Currently no acute respiratory distress, alert and oriented x4, slightly fatigued but overall no acute distress and conversational  HEENT Normal cephalic, atraumatic without obvious deformity. Pupils equal, round, and reactive to light. Extra ocular muscles intact. Anicteric sclera, mildly dry mucous membranes, no nystagmus  Neck: Supple, no JVD  Lungs: Clear breath sounds bilaterall  Heart: Currently bradycardic, regular rhythm overall, no murmurs detected  Abdomen: Soft, nontender, nondistended, active bowel sounds  Extremities: No edema  Skin: No rashes  Neurologic: Cranial nerves II through XII are intact, strength is 5 of 5 to all extremities, no sensory or motor deficits overall  Mental status: Alert, oriented, thought content appropriate.   Capillary Refill: Acceptable  < 3 seconds  Peripheral Pulses: +3 Easily felt, not easily obliterated with pressure    CT head without contrast:  1. No acute intracranial hemorrhage or wedge-shaped area of ischemia. 2. Intracranial atherosclerosis with mild patchy white matter low attenuation   suggestive of chronic microvascular ischemic change. 3. Chronic paranasal sinus disease. Chest x-ray: No acute process      CT chest abdomen pelvis IV contrast:  Chest       There is a questionable filling defect seen in right upper lobe pulmonary   artery. However this finding could be artifactual from streak artifact from   contrast bolus in the SVC and the timing the study rather than a true tiny   pulmonary embolus. .  Correlate for any clinical risk factors of pulmonary   embolism. Consider CT PA for further evaluation of this finding to see if it   persists. A few scattered ground-glass opacities are seen throughout the lungs. Ground-glass opacities are nonspecific can be due to atelectasis,   postinflammatory-infectious change or early edema.        5 mm noncalcified pulmonary nodule right lower lobe       Abdomen pelvis:       No acute intra-abdominal abnormality         CBC   Recent Labs     12/05/22 0203 12/05/22 0559   WBC 10.3 7.5   HGB 13.9 11.5*   HCT 42.5 35.4*    188      RENAL  Recent Labs     12/05/22 0203 12/05/22 0559    134*   K 4.0 3.9    102   CO2 27 24   PHOS  --  3.2   BUN 28* 28*   CREATININE 1.3 1.0     LFT'S  Recent Labs     12/05/22 0415   *   *   BILIDIR <0.2   BILITOT 0.3   ALKPHOS 111     COAG  Recent Labs     12/05/22 0415   INR 0.98     CARDIAC ENZYMES  Recent Labs     12/05/22 0203 12/05/22 0415 12/05/22  0732   TROPONINI 0.10* 0.09* 0.09*       U/A:    Lab Results   Component Value Date/Time    NITRITE NEGATIVE 04/05/2022 03:52 PM    COLORU YELLOW 04/05/2022 03:52 PM    CLARITYU CLEAR 04/05/2022 03:52 PM    SPECGRAV 1.020 04/05/2022 03:52 PM LEUKOCYTESUR NEGATIVE 04/05/2022 03:52 PM    BLOODU NEGATIVE 04/05/2022 03:52 PM    GLUCOSEU NEGATIVE 04/05/2022 03:52 PM       ABG  No results found for: ICN5FES, BEART, J6YSGQJP, PHART, THGBART, YCM1ICX, PO2ART, QRW5QGO        Active Hospital Problems    Diagnosis Date Noted    Syncope [R55] 12/05/2022     Priority: Medium    HTN (hypertension) [I10] 12/05/2022     Priority: Medium    Elevated troponin [R77.8] 12/05/2022     Priority: Medium    Syncope and collapse [R55] 12/05/2022     Priority: Medium         PHYSICIANS CERTIFICATION:    I certify that Freedom Lozoay is expected to be hospitalized for greater than 2 midnights based on the following assessment and plan:      ASSESSMENT/PLAN:  Syncope  Elevated troponin  Hypertension    Plan:  Patient mentions about the 1 syncopal episode that again occurred last few days ago and notes weight loss for least a month now and has been increasingly fatigued over the last few weeks but has not had any of the symptoms before.   D-dimer was elevated, CT scan noted some component of a streaking artifact that at this time I do not feel is compatible with a pulmonary embolus that could cause patient's D-dimer to be that elevated  Pending other studies including ESR, CRP, LDH  Uncertain etiology of elevated troponin, 0.10 but currently chest pain-free and without any EKG findings of ACS, will trend troponins for now  If patient starts having chest pain may consider anticoagulation therapy with heparin  Patient denies that he has any history of drug abuse but it is noted the patient did have a previous urine drug screen positive for benzos, fentanyl, methadone, oxycodone, and cannabis back on 11/29/2022  Patient was given his home metoprolol  mg, uncertain if potentially this medication might be causing patient's syncopal episode as it is a very high dose and patient is already bradycardic in the ED, will hold this medication for now pending cardiac evaluation  Start patient lisinopril 5 mg daily for hypertension  Continue patient on per hour normal saline for IV fluid hydration  Cardiology consult for elevated troponin as well as syncope and weight loss of uncertain etiology  Transthoracic echo ordered for the morning  Repeat labs daily      DVT Prophylaxis: Lovenox  Diet: ADULT DIET; Clear Liquid; No Caffeine  Code Status: Full Code  PT/OT Eval Status: Ambulatory    Dispo -pending clinical course       Ronal Keller DO    Thank you Denis Vogel MD for the opportunity to be involved in this patient's care. If you have any questions or concerns please feel free to contact me at [phone].

## 2022-12-05 NOTE — TELEPHONE ENCOUNTER
Called again by wife   Confused lethargic falling asleep  Told to go to ed  Previous tox screen problems

## 2022-12-05 NOTE — ED TRIAGE NOTES
Pt presents to ED with wife reporting that pt has had multiple issues where he was found asleep in the shower and parking lot. Pt states he had an increase in medications, and he has high levels of stress. Pt wife with multiple calls to registrar and pt during triage time. Pt denies SOB, CP, or headache. BP repeated multiple times, and remains high.

## 2022-12-05 NOTE — PROGRESS NOTES
Pain in right lower thigh. Pt states it feels tight and is painful. This RN assessed and the indicated area does feel warm to touch and tight. Pt expressed concern over withdrawling w/o xanax.  MD messaged

## 2022-12-05 NOTE — FLOWSHEET NOTE
12/05/22 0903   Vital Signs   Orthostatic B/P and Pulse? Yes   Blood Pressure Lying 184/94   Pulse Lying 50 PER MINUTE   Blood Pressure Sitting 158/100   Pulse Sitting 49 PER MINUTE   Blood Pressure Standing 145/91   Pulse Standing 53 PER MINUTE     Orthostatics completed. Pt tolerated well with no new complaints.

## 2022-12-05 NOTE — PROGRESS NOTES
Pt arrived to room 4108 from ED. Pt /97 hr 51. Pt alert and oriented. Pt wife is at bedside. Pt is slurring words at times when answering orientation questions. Pt states he has been slurring words on and off for last week. Pt has Heparin infusing at 15. 8. MD messaged. Bed alarm is on. Call light, bedside table and urinal within reach.

## 2022-12-05 NOTE — CONSULTS
[address] [address]                                  CONSULTATION    PATIENT NAME: Vilma Rinne                      :        1965  MED REC NO:   [id number]                          ROOM:       [id number]  ACCOUNT NO:   [de-identified]                           ADMIT DATE: 2022  PROVIDER:     Michelle Hahn MD    CONSULT DATE:  2022    CARDIAC CONSULTATION NOTE    HISTORY OF PRESENT ILLNESS:  This is a 49-year-old male with a history  of hypertension and hyperlipidemia presented to the hospital after he  apparently had episodes of unresponsiveness in last four to five days. He said on Thursday or Friday while he was eating at a [employer]  he supposedly had fell asleep or passed out and took about five hours to  wake up. He had _____ witnessing any of these episodes. He denied  having any chest pain, tightness, or pressure with this episode. He  again had an episode while he was at home on Saturday, when he was  taking shower and he apparently became unresponsive in the shower. He  refused to come to the hospital initially, but finally decided to come  to the hospital on . In any of these episode, he has no shortness  of breath, palpitation, or chest pain. In the emergency room, his EKG showed some new T-wave inversion. His  troponin was elevated, which has led to this cardiac consultation. He  did have a CT chest, which raised a possibility of feeling defect. The  patient has apparently had flu viral illness a few weeks ago. He has  also been complaining of slurring of his speech. PAST MEDICAL HISTORY:  1. History of hypertension. 2.  Hyperlipidemia. 3.  History of anxiety and depression. PAST SURGICAL HISTORY:  The patient had bilateral knee surgery, dental  surgery, colonoscopies. FAMILY HISTORY:  No known history of early premature atherosclerosis.     SOCIAL HISTORY:

## 2022-12-05 NOTE — ED PROVIDER NOTES
[address] [facility]      Pt Name: Chel Ennis  MRN: [id number]  [place] 1965  Date ofevaluation: 12/4/2022  Provider: Minda Cameron MD    CHIEF COMPLAINT       Chief Complaint   Patient presents with    Loss of Consciousness     Pt states he has had episodes of falling asleep in the shower and in parking lots. Pt states wife is upset over these episodes. Pt states medications have recently been increased. HISTORY OF PRESENT ILLNESS   (Location/Symptom, Timing/Onset,Context/Setting, Quality, Duration, Modifying Factors, Severity)  Note limiting factors. Chel Ennis is a 59 y.o. male  who  has a past medical history of Anxiety, Depression, Hyperlipidemia, Hypertension, Osteoarthritis, Torn ACL, and Torn meniscus. who presents to the emergency department    41-year-old male with a history of hypertension, hyperlipidemia, anxiety, depression who presents for episodes of falling asleep at random times, which is happened several instances over the last few days. Last week he fell asleep in his car also fell asleep in the shower yesterday. Patient was able to wake up from these incidents without confusion. Wife states that he fell asleep in his car for several hours and that she called the police because he wouldn't answer his phone. Patient denies any other symptoms. His wife is concerned because she thinks that he has been slurring his words at times. Patient states that his medications have recently been increased. Patient intermittently takes Xanax for his anxiety but states he has not taken any for 4-5 days. He is taking OTC sleeping medication. He is also on venlafaxine and states he recently went up on his dose which is when these episodes started. Otherwise he has no complaints he denies any chest pain, headache, abdominal pain, numbness, weakness, dizziness, lightheadedness, vision changes.   He has no other associated symptoms. Patient is currently asymptomatic. The history is provided by the patient. No  was used. NursingNotes were reviewed. REVIEW OF SYSTEMS    (2-9 systems for level 4, 10 or more for level 5)     Review of Systems    Except as noted above the remainder of the review of systems was reviewed and negative. PAST MEDICAL HISTORY     Past Medical History:   Diagnosis Date    Anxiety     Depression     Hyperlipidemia     Hypertension     borderline     Osteoarthritis     knees    Torn ACL 2003    left, right    Torn meniscus     left, right         SURGICALHISTORY       Past Surgical History:   Procedure Laterality Date    COLONOSCOPY N/A 4/13/2021    COLONOSCOPY DIAGNOSTIC performed by Darling Harris MD at [address] [place] [address]      tooth extraction    KNEE ARTHROSCOPY Left          CURRENT MEDICATIONS       Previous Medications    ALPRAZOLAM (XANAX) 0.5 MG TABLET    Take 1 tablet by mouth nightly as needed for Sleep for up to 30 days. ATORVASTATIN (LIPITOR) 20 MG TABLET    Take 1 tablet by mouth daily    B COMPLEX VITAMINS (VITAMIN B COMPLEX PO)    Take by mouth    BIOTIN 1 MG CAPS    Take by mouth    DICYCLOMINE (BENTYL) 10 MG CAPSULE    TAKE ONE CAPSULE BY MOUTH THREE TIMES A DAY AS NEEDED FOR ABDOMINAL BLOATING    GLUCOSAMINE-CHONDROITIN (GLUCOSAMINE CHONDR COMPLEX PO)    Take by mouth Take 2-3 by mouth every morning    IBUPROFEN (ADVIL;MOTRIN) 800 MG TABLET    Take 1 tablet by mouth every 8 hours as needed for Pain    METOPROLOL SUCCINATE (TOPROL XL) 100 MG EXTENDED RELEASE TABLET    Take 1 tablet by mouth daily    MISC NATURAL PRODUCTS (GLUCOS-CHONDROIT-MSM COMPLEX PO)    Take by mouth daily    VENLAFAXINE (EFFEXOR XR) 150 MG EXTENDED RELEASE CAPSULE    TAKE ONE CAPSULE BY MOUTH DAILY    VITAMIN B-1 (THIAMINE) 100 MG TABLET    Take 100 mg by mouth daily            Patient has no known allergies.     FAMILY HISTORY       Family History   Problem Relation Age of Onset    Other Mother         Pulmonary embolism     Other Father         does not know     Cancer Maternal Grandfather         abdominal cancer of some kind           SOCIAL HISTORY       Social History     Socioeconomic History    Marital status:      Spouse name: None    Number of children: None    Years of education: None    Highest education level: None   Tobacco Use    Smoking status: Every Day     Packs/day: 1.00     Types: Cigarettes     Start date: 0     Last attempt to quit: 12/15/2021     Years since quittin.9    Smokeless tobacco: Never   Vaping Use    Vaping Use: Former   Substance and Sexual Activity    Alcohol use: Yes     Alcohol/week: 6.0 standard drinks     Types: 3 Cans of beer, 3 Shots of liquor per week     Comment: social drink. Drug use: No    Sexual activity: Yes     Partners: Female     Social Determinants of Health     Financial Resource Strain: Low Risk     Difficulty of Paying Living Expenses: Not hard at all   Food Insecurity: No Food Insecurity    Worried About [employer] in the Last Year: Never true    [employer] in the Last Year: Never true   Transportation Needs: No Transportation Needs    Lack of Transportation (Medical): No    Lack of Transportation (Non-Medical):  No       SCREENINGS    Mary Coma Scale  Eye Opening: Spontaneous  Best Verbal Response: Oriented  Best Motor Response: Obeys commands  Mary Coma Scale Score: 15        PHYSICAL EXAM    (up to 7 for level 4, 8 or more for level 5)     ED Triage Vitals   BP Temp Temp Source Heart Rate Resp SpO2 Height Weight   226 22 0116 22 0116 22 -- --   (!) 217/106 98.2 °F (36.8 °C) Oral 71 16 99 %         Physical Exam    RESULTS     EKG: All EKG's are interpreted by the Emergency Department Physician who either signs or Co-signs this chart in the absence of a cardiologist.    EKG Interpretation    Interpreted by emergency department physician    Rhythm: normal sinus   Rate: normal  Axis: left  Ectopy: none  Conduction: normal  ST Segments: nonspecific changes  T Waves: non specific changes  Q Waves: none    Clinical Impression: Normal sinus rhythm with left axis deviation and nonspecific ST and T wave changes which are slightly worse in the anterior lateral leads compared to previous EKG dated January 11, 2022      RADIOLOGY:   Non-plain filmimages such as CT, Ultrasound and MRI are read by the radiologist.  All images reviewed by the emergency department physician who either signs or cosigns this chart. Interpretation per the Radiologist below, if available at the time ofthis note:    CT Head W/O Contrast   Final Result   1. No acute intracranial hemorrhage or wedge-shaped area of ischemia. 2. Intracranial atherosclerosis with mild patchy white matter low attenuation   suggestive of chronic microvascular ischemic change. 3. Chronic paranasal sinus disease. XR CHEST (2 VW)   Final Result   Clear chest without acute cardiopulmonary process. ED BEDSIDE ULTRASOUND:   Performed by ED Physician - none    LABS:  Labs Reviewed   BASIC METABOLIC PANEL W/ REFLEX TO MG FOR LOW K - Abnormal; Notable for the following components:       Result Value    Glucose 110 (*)     BUN 28 (*)     All other components within normal limits   CBC WITH AUTO DIFFERENTIAL - Abnormal; Notable for the following components:    RDW 16.2 (*)     Neutrophils Absolute 8.6 (*)     All other components within normal limits   TROPONIN - Abnormal; Notable for the following components:    Troponin 0.10 (*)     All other components within normal limits       All other labs were within normal range or not returned as of this dictation.     EMERGENCY DEPARTMENT COURSE and DIFFERENTIAL DIAGNOSIS/MDM:   Vitals:    Vitals:    12/05/22 0116 12/05/22 0118 12/05/22 0240   BP:  (!) 217/106 (!) 217/106   Pulse: 71  71   Resp: 16     Temp: 98.2 °F (36.8 °C)     TempSrc: Oral     SpO2:  99%        Patient was given thefollowing medications:  Medications   metoprolol succinate (TOPROL XL) extended release tablet 100 mg (100 mg Oral Given 12/5/22 0240)       ED COURSE & MEDICAL DECISION MAKING    Pertinent Labs & Imaging studies reviewed. (See chart for details)   -  Patient seen and evaluated in the emergency department. -  Triage and nursing notes reviewed and incorporated. -  Old chart records reviewed and incorporated. -  Differential diagnosis includes: Differential Diagnosis:    Hypoxemia/ischemic encephalopathy, hepatic encephalopathy   Seizure or postictal state   Alterations of glucose such as hypoglycemia and hyperglycemia    Alterations in perfusions such as hypotension and hypoperfusion    Alterations in electrolytes such as disturbances in sodium or calcium   Infectious processes such as sepsis from a pneumonia or urinary tract infection    Substance use or withdrawal, especially alcohol and drugs    Medication adverse event or interaction    Vitamin deficiencies such as Wernicke's encephalopathy    CNS lesion, injury, infection (CVA, subdural hematoma, meningitis, encephalitis)    Alterations in hormones such as thyroid or adrenal abnormalities    Alterations in cardiac functioning such as arrhythmia, MI or CHF    Alteration in temperature such as hyperthermia or hypothermia    Dehydration, sleep deprivation   Change in medical regimen    Alteration in lifestyle, environment, or personal relationships    78-year-old male presents for altered mental status and episodes of sleeping of unclear etiology. Of note patient has positive drug screen recently for oxycodone and fentanyl as well as cannabinoids. Patient takes benzodiazepines regularly and does endorse using CBD patient took oxycodone recently for a wrist injury but denies any other illicit drug use. States there is no way that he could have had fentanyl in his system.   Patient does also take over-the-counter sleep medication. Potentially from overmedication or concern for possible encephalopathy. Patient has no focal neurologic deficits at this time his exam is unremarkable. His EKG shows slightly worsening anterior lateral changes without ST elevation. Patient has no chest pain or shortness of breath. He has no other focal deficits at this time. He is afebrile not tachycardic saturating well on room air he is significantly hypertensive. He has a history of poorly controlled hypertension and does not take his blood pressure medication regularly. Will obtain laboratory work-up to rule out endorgan damage as well as give patient home blood pressure medication as he has not taken it regularly. Of note based on medical records        -  Work-up included:  See above  -  ED treatment included: See above  -  Results discussed with patient. The patient is agreeable with plan of care and disposition. Is this patient to be included in the SEP-1 Core Measure due to severe sepsis or septic shock? No   Exclusion criteria - the patient is NOT to be included for SEP-1 Core Measure due to: Infection is not suspected     REASSESSMENT     ED Course as of 12/05/22 0322   Mon Dec 05, 2022   0301 Head is unremarkable for any acute changes there are patchy white matter low-attenuation changes consistent with chronic microvascular ischemic disease. Chest x-ray is unremarkable chemistry and CBC are unremarkable. Patient is just been given blood pressure medication we will reevaluate his blood pressure however patient has an elevated troponin of unclear etiology at this time. He may have hypertensive urgency may also have cardiac syncope. Patient will need to be admitted for further cardiac work-up as well as further work-up for evaluation of his medication as well as potential altered mental status.  [SC]      ED Course User Index  [SC] Antoine Cobian MD         CRITICAL CARE TIME   Total Critical Care time was 24 minutes, excluding separately reportable procedures. There was a high probability of clinically significant/life threatening deterioration in the patient's condition which required my urgent intervention. This includes multiple reevaluations, vital sign monitoring, pulse oximetry monitoring, telemetry monitoring, clinical response to the IV medications, reviewing the nursing notes, consultation time, dictation/documentation time, and interpretation of the labwork. (This time excludes time spent performing procedures). CONSULTS:  None    PROCEDURES:  Unless otherwise noted below, none     Procedures    FINAL IMPRESSION      1. Syncope and collapse    2. Elevated troponin          DISPOSITION/PLAN   DISPOSITION Decision To Admit 12/05/2022 03:20:40 AM      PATIENT REFERREDTO:  No follow-up provider specified.     DISCHARGEMEDICATIONS:  New Prescriptions    No medications on file          (Please note that portions of this note were completed with a voice recognition program.  Efforts were made to edit the dictations but occasionally words are mis-transcribed.)    Suleman Sosa MD (electronically signed)  Attending Emergency Physician         Suleman Sosa MD  12/05/22 1616

## 2022-12-05 NOTE — PROGRESS NOTES
Discussed with the patient wife , updated on results of the studies so far and possibility of drug overdose on home xanax and oxycodone at home, per wife that is possible because he has been sleeping more often and was found on the ground few times in the last 1 week, no suicidal or homicidal intentions reported to wife by patient

## 2022-12-05 NOTE — PROGRESS NOTES
CTPA results are reviewed. Patient is now on therapeutic IV heparin . Echocardiogram is still pending. Will review echocardiogram.  If patient has left ventricular wall motion abnormalities on echocardiogram, will proceed with left heart study.   If echocardiogram shows no wall motion abnormalities, will consider chemical stress test.  We will keep patient n.p.o. after midnight

## 2022-12-06 ENCOUNTER — APPOINTMENT (OUTPATIENT)
Dept: CT IMAGING | Age: 57
DRG: 176 | End: 2022-12-06
Payer: COMMERCIAL

## 2022-12-06 ENCOUNTER — APPOINTMENT (OUTPATIENT)
Dept: MRI IMAGING | Age: 57
DRG: 176 | End: 2022-12-06
Payer: COMMERCIAL

## 2022-12-06 PROBLEM — I77.0 ARTERIOVENOUS FISTULA (HCC): Status: ACTIVE | Noted: 2022-12-06

## 2022-12-06 LAB
ALBUMIN SERPL-MCNC: 3.4 G/DL (ref 3.4–5)
AMPHETAMINE SCREEN, URINE: ABNORMAL
ANION GAP SERPL CALCULATED.3IONS-SCNC: 10 MMOL/L (ref 3–16)
APTT: 40.2 SEC (ref 23–34.3)
APTT: 75.3 SEC (ref 23–34.3)
BARBITURATE SCREEN URINE: ABNORMAL
BASOPHILS ABSOLUTE: 0.1 K/UL (ref 0–0.2)
BASOPHILS RELATIVE PERCENT: 0.5 %
BENZODIAZEPINE SCREEN, URINE: ABNORMAL
BILIRUBIN URINE: NEGATIVE
BLOOD, URINE: NEGATIVE
BUN BLDV-MCNC: 13 MG/DL (ref 7–20)
CALCIUM SERPL-MCNC: 8.9 MG/DL (ref 8.3–10.6)
CANNABINOID SCREEN URINE: POSITIVE
CHLORIDE BLD-SCNC: 107 MMOL/L (ref 99–110)
CLARITY: CLEAR
CO2: 25 MMOL/L (ref 21–32)
COCAINE METABOLITE SCREEN URINE: ABNORMAL
COLOR: YELLOW
CREAT SERPL-MCNC: 0.8 MG/DL (ref 0.9–1.3)
EKG ATRIAL RATE: 60 BPM
EKG DIAGNOSIS: NORMAL
EKG P AXIS: 54 DEGREES
EKG P-R INTERVAL: 134 MS
EKG Q-T INTERVAL: 488 MS
EKG QRS DURATION: 90 MS
EKG QTC CALCULATION (BAZETT): 488 MS
EKG R AXIS: -45 DEGREES
EKG T AXIS: -69 DEGREES
EKG VENTRICULAR RATE: 60 BPM
EOSINOPHILS ABSOLUTE: 0.1 K/UL (ref 0–0.6)
EOSINOPHILS RELATIVE PERCENT: 0.6 %
FENTANYL SCREEN, URINE: POSITIVE
GFR SERPL CREATININE-BSD FRML MDRD: >60 ML/MIN/{1.73_M2}
GLUCOSE BLD-MCNC: 105 MG/DL (ref 70–99)
GLUCOSE URINE: NEGATIVE MG/DL
HCT VFR BLD CALC: 33.4 % (ref 40.5–52.5)
HEMOGLOBIN: 10.8 G/DL (ref 13.5–17.5)
KETONES, URINE: NEGATIVE MG/DL
LEUKOCYTE ESTERASE, URINE: NEGATIVE
LYMPHOCYTES ABSOLUTE: 1.3 K/UL (ref 1–5.1)
LYMPHOCYTES RELATIVE PERCENT: 13 %
Lab: ABNORMAL
MAGNESIUM: 2 MG/DL (ref 1.8–2.4)
MCH RBC QN AUTO: 30.1 PG (ref 26–34)
MCHC RBC AUTO-ENTMCNC: 32.4 G/DL (ref 31–36)
MCV RBC AUTO: 92.9 FL (ref 80–100)
METHADONE SCREEN, URINE: ABNORMAL
MICROSCOPIC EXAMINATION: NORMAL
MONOCYTES ABSOLUTE: 0.7 K/UL (ref 0–1.3)
MONOCYTES RELATIVE PERCENT: 6.9 %
NEUTROPHILS ABSOLUTE: 8.1 K/UL (ref 1.7–7.7)
NEUTROPHILS RELATIVE PERCENT: 79 %
NITRITE, URINE: NEGATIVE
OPIATE SCREEN URINE: ABNORMAL
OXYCODONE URINE: ABNORMAL
PDW BLD-RTO: 15.6 % (ref 12.4–15.4)
PH UA: 6 (ref 5–8)
PH UA: 6.5
PHENCYCLIDINE SCREEN URINE: ABNORMAL
PHOSPHORUS: 2.6 MG/DL (ref 2.5–4.9)
PLATELET # BLD: 206 K/UL (ref 135–450)
PMV BLD AUTO: 7.9 FL (ref 5–10.5)
POTASSIUM SERPL-SCNC: 4 MMOL/L (ref 3.5–5.1)
PROTEIN UA: NEGATIVE MG/DL
RBC # BLD: 3.59 M/UL (ref 4.2–5.9)
SODIUM BLD-SCNC: 142 MMOL/L (ref 136–145)
SPECIFIC GRAVITY UA: 1.03 (ref 1–1.03)
URINE TYPE: NORMAL
UROBILINOGEN, URINE: 1 E.U./DL
WBC # BLD: 10.2 K/UL (ref 4–11)

## 2022-12-06 PROCEDURE — 6370000000 HC RX 637 (ALT 250 FOR IP): Performed by: NURSE PRACTITIONER

## 2022-12-06 PROCEDURE — 78452 HT MUSCLE IMAGE SPECT MULT: CPT

## 2022-12-06 PROCEDURE — 80069 RENAL FUNCTION PANEL: CPT

## 2022-12-06 PROCEDURE — 6360000004 HC RX CONTRAST MEDICATION: Performed by: STUDENT IN AN ORGANIZED HEALTH CARE EDUCATION/TRAINING PROGRAM

## 2022-12-06 PROCEDURE — 93017 CV STRESS TEST TRACING ONLY: CPT

## 2022-12-06 PROCEDURE — 6360000002 HC RX W HCPCS: Performed by: INTERNAL MEDICINE

## 2022-12-06 PROCEDURE — 93971 EXTREMITY STUDY: CPT

## 2022-12-06 PROCEDURE — 87086 URINE CULTURE/COLONY COUNT: CPT

## 2022-12-06 PROCEDURE — 3430000000 HC RX DIAGNOSTIC RADIOPHARMACEUTICAL: Performed by: INTERNAL MEDICINE

## 2022-12-06 PROCEDURE — 3430000000 HC RX DIAGNOSTIC RADIOPHARMACEUTICAL

## 2022-12-06 PROCEDURE — 80307 DRUG TEST PRSMV CHEM ANLYZR: CPT

## 2022-12-06 PROCEDURE — 85730 THROMBOPLASTIN TIME PARTIAL: CPT

## 2022-12-06 PROCEDURE — A9577 INJ MULTIHANCE: HCPCS | Performed by: STUDENT IN AN ORGANIZED HEALTH CARE EDUCATION/TRAINING PROGRAM

## 2022-12-06 PROCEDURE — 2580000003 HC RX 258: Performed by: STUDENT IN AN ORGANIZED HEALTH CARE EDUCATION/TRAINING PROGRAM

## 2022-12-06 PROCEDURE — 2500000003 HC RX 250 WO HCPCS: Performed by: INTERNAL MEDICINE

## 2022-12-06 PROCEDURE — 81003 URINALYSIS AUTO W/O SCOPE: CPT

## 2022-12-06 PROCEDURE — 6370000000 HC RX 637 (ALT 250 FOR IP): Performed by: INTERNAL MEDICINE

## 2022-12-06 PROCEDURE — 83735 ASSAY OF MAGNESIUM: CPT

## 2022-12-06 PROCEDURE — A9502 TC99M TETROFOSMIN: HCPCS | Performed by: INTERNAL MEDICINE

## 2022-12-06 PROCEDURE — 93010 ELECTROCARDIOGRAM REPORT: CPT | Performed by: INTERNAL MEDICINE

## 2022-12-06 PROCEDURE — 2580000003 HC RX 258: Performed by: INTERNAL MEDICINE

## 2022-12-06 PROCEDURE — 36415 COLL VENOUS BLD VENIPUNCTURE: CPT

## 2022-12-06 PROCEDURE — 99233 SBSQ HOSP IP/OBS HIGH 50: CPT | Performed by: INTERNAL MEDICINE

## 2022-12-06 PROCEDURE — A9502 TC99M TETROFOSMIN: HCPCS

## 2022-12-06 PROCEDURE — 99221 1ST HOSP IP/OBS SF/LOW 40: CPT | Performed by: STUDENT IN AN ORGANIZED HEALTH CARE EDUCATION/TRAINING PROGRAM

## 2022-12-06 PROCEDURE — 94760 N-INVAS EAR/PLS OXIMETRY 1: CPT

## 2022-12-06 PROCEDURE — 93306 TTE W/DOPPLER COMPLETE: CPT

## 2022-12-06 PROCEDURE — 93005 ELECTROCARDIOGRAM TRACING: CPT | Performed by: STUDENT IN AN ORGANIZED HEALTH CARE EDUCATION/TRAINING PROGRAM

## 2022-12-06 PROCEDURE — 1200000000 HC SEMI PRIVATE

## 2022-12-06 PROCEDURE — 73706 CT ANGIO LWR EXTR W/O&W/DYE: CPT

## 2022-12-06 PROCEDURE — 85025 COMPLETE CBC W/AUTO DIFF WBC: CPT

## 2022-12-06 PROCEDURE — 70551 MRI BRAIN STEM W/O DYE: CPT

## 2022-12-06 PROCEDURE — 70552 MRI BRAIN STEM W/DYE: CPT

## 2022-12-06 PROCEDURE — 6370000000 HC RX 637 (ALT 250 FOR IP): Performed by: STUDENT IN AN ORGANIZED HEALTH CARE EDUCATION/TRAINING PROGRAM

## 2022-12-06 RX ORDER — PROMETHAZINE HYDROCHLORIDE 25 MG/1
25 TABLET ORAL EVERY 6 HOURS PRN
Status: DISCONTINUED | OUTPATIENT
Start: 2022-12-06 | End: 2022-12-10 | Stop reason: HOSPADM

## 2022-12-06 RX ORDER — METHOCARBAMOL 750 MG/1
750 TABLET, FILM COATED ORAL EVERY 6 HOURS PRN
Status: DISCONTINUED | OUTPATIENT
Start: 2022-12-06 | End: 2022-12-10 | Stop reason: HOSPADM

## 2022-12-06 RX ORDER — HEPARIN SODIUM 1000 [USP'U]/ML
7000 INJECTION, SOLUTION INTRAVENOUS; SUBCUTANEOUS ONCE
Status: COMPLETED | OUTPATIENT
Start: 2022-12-06 | End: 2022-12-06

## 2022-12-06 RX ORDER — LANOLIN ALCOHOL/MO/W.PET/CERES
3 CREAM (GRAM) TOPICAL NIGHTLY
Status: DISCONTINUED | OUTPATIENT
Start: 2022-12-06 | End: 2022-12-10 | Stop reason: HOSPADM

## 2022-12-06 RX ORDER — DICYCLOMINE HCL 20 MG
20 TABLET ORAL EVERY 6 HOURS PRN
Status: DISCONTINUED | OUTPATIENT
Start: 2022-12-06 | End: 2022-12-10 | Stop reason: HOSPADM

## 2022-12-06 RX ORDER — LISINOPRIL 10 MG/1
10 TABLET ORAL DAILY
Status: DISCONTINUED | OUTPATIENT
Start: 2022-12-07 | End: 2022-12-10 | Stop reason: HOSPADM

## 2022-12-06 RX ORDER — LOPERAMIDE HYDROCHLORIDE 2 MG/1
2 CAPSULE ORAL 4 TIMES DAILY PRN
Status: DISCONTINUED | OUTPATIENT
Start: 2022-12-06 | End: 2022-12-10 | Stop reason: HOSPADM

## 2022-12-06 RX ORDER — HYDROXYZINE PAMOATE 25 MG/1
50 CAPSULE ORAL EVERY 8 HOURS PRN
Status: DISCONTINUED | OUTPATIENT
Start: 2022-12-06 | End: 2022-12-10 | Stop reason: HOSPADM

## 2022-12-06 RX ORDER — MORPHINE SULFATE 2 MG/ML
1 INJECTION, SOLUTION INTRAMUSCULAR; INTRAVENOUS EVERY 4 HOURS PRN
Status: DISCONTINUED | OUTPATIENT
Start: 2022-12-06 | End: 2022-12-08

## 2022-12-06 RX ORDER — ALPRAZOLAM 0.5 MG/1
0.5 TABLET ORAL NIGHTLY PRN
Status: DISCONTINUED | OUTPATIENT
Start: 2022-12-06 | End: 2022-12-10 | Stop reason: HOSPADM

## 2022-12-06 RX ADMIN — REGADENOSON 0.4 MG: 0.08 INJECTION, SOLUTION INTRAVENOUS at 11:20

## 2022-12-06 RX ADMIN — SODIUM CHLORIDE, PRESERVATIVE FREE 10 ML: 5 INJECTION INTRAVENOUS at 13:17

## 2022-12-06 RX ADMIN — Medication 100 MG: at 08:25

## 2022-12-06 RX ADMIN — HYDRALAZINE HYDROCHLORIDE 10 MG: 20 INJECTION INTRAMUSCULAR; INTRAVENOUS at 19:02

## 2022-12-06 RX ADMIN — SODIUM CHLORIDE: 9 INJECTION, SOLUTION INTRAVENOUS at 03:35

## 2022-12-06 RX ADMIN — ACETAMINOPHEN 650 MG: 325 TABLET, FILM COATED ORAL at 08:27

## 2022-12-06 RX ADMIN — METHOCARBAMOL 750 MG: 750 TABLET ORAL at 19:01

## 2022-12-06 RX ADMIN — LISINOPRIL 5 MG: 5 TABLET ORAL at 13:16

## 2022-12-06 RX ADMIN — Medication 3 MG: at 20:48

## 2022-12-06 RX ADMIN — IOPAMIDOL 75 ML: 755 INJECTION, SOLUTION INTRAVENOUS at 17:13

## 2022-12-06 RX ADMIN — ATORVASTATIN CALCIUM 80 MG: 80 TABLET, FILM COATED ORAL at 20:48

## 2022-12-06 RX ADMIN — TETROFOSMIN 30 MILLICURIE: 1.38 INJECTION, POWDER, LYOPHILIZED, FOR SOLUTION INTRAVENOUS at 11:23

## 2022-12-06 RX ADMIN — GADOBENATE DIMEGLUMINE 17 ML: 529 INJECTION, SOLUTION INTRAVENOUS at 17:35

## 2022-12-06 RX ADMIN — HEPARIN SODIUM 1580 UNITS/HR: 10000 INJECTION, SOLUTION INTRAVENOUS at 03:36

## 2022-12-06 RX ADMIN — HEPARIN SODIUM 7000 UNITS: 1000 INJECTION INTRAVENOUS; SUBCUTANEOUS at 22:51

## 2022-12-06 RX ADMIN — ASPIRIN 81 MG: 81 TABLET, CHEWABLE ORAL at 08:25

## 2022-12-06 RX ADMIN — MORPHINE SULFATE 1 MG: 2 INJECTION, SOLUTION INTRAMUSCULAR; INTRAVENOUS at 13:56

## 2022-12-06 RX ADMIN — TETROFOSMIN 10 MILLICURIE: 1.38 INJECTION, POWDER, LYOPHILIZED, FOR SOLUTION INTRAVENOUS at 09:42

## 2022-12-06 RX ADMIN — ALPRAZOLAM 0.5 MG: 0.5 TABLET ORAL at 01:05

## 2022-12-06 ASSESSMENT — PAIN DESCRIPTION - LOCATION
LOCATION: LEG

## 2022-12-06 ASSESSMENT — PAIN SCALES - GENERAL
PAINLEVEL_OUTOF10: 8
PAINLEVEL_OUTOF10: 10
PAINLEVEL_OUTOF10: 10

## 2022-12-06 ASSESSMENT — PAIN DESCRIPTION - DESCRIPTORS: DESCRIPTORS: ACHING

## 2022-12-06 ASSESSMENT — PAIN DESCRIPTION - ORIENTATION
ORIENTATION: RIGHT

## 2022-12-06 ASSESSMENT — ENCOUNTER SYMPTOMS
GASTROINTESTINAL NEGATIVE: 1
SHORTNESS OF BREATH: 0

## 2022-12-06 NOTE — PROGRESS NOTES
Dr. Sandra Sharpe and Cardiology notified of pt vascular imaging results.  Pt transported to nuclear imaging

## 2022-12-06 NOTE — ACP (ADVANCE CARE PLANNING)
Advance Care Planning     Advance Care Planning Activator (Inpatient)  Conversation Note      Date of ACP Conversation: 12/6/2022     Conversation Conducted with: Patient with Decision Making Capacity    ACP Activator: [address] Decision Maker:     Current Designated Health Care Decision Maker:     Primary Decision Maker: [facility] [phone]    Care Preferences    Ventilation: \"If you were in your present state of health and suddenly became very ill and were unable to breathe on your own, what would your preference be about the use of a ventilator (breathing machine) if it were available to you? \"      Would the patient desire the use of ventilator (breathing machine)?: yes    \"If your health worsens and it becomes clear that your chance of recovery is unlikely, what would your preference be about the use of a ventilator (breathing machine) if it were available to you? \"     Would the patient desire the use of ventilator (breathing machine)?: No      Resuscitation  \"CPR works best to restart the heart when there is a sudden event, like a heart attack, in someone who is otherwise healthy. Unfortunately, CPR does not typically restart the heart for people who have serious health conditions or who are very sick. \"    \"In the event your heart stopped as a result of an underlying serious health condition, would you want attempts to be made to restart your heart (answer \"yes\" for attempt to resuscitate) or would you prefer a natural death (answer \"no\" for do not attempt to resuscitate)? \" yes       [] Yes   [x] No   Educated Patient / Trevor Fuentes regarding differences between Advance Directives and portable DNR orders.     Length of ACP Conversation in minutes:  5    Conversation Outcomes:  [x] ACP discussion completed  [] Existing advance directive reviewed with patient; no changes to patient's previously recorded wishes  [] New Advance Directive completed  [] Portable Do Not Rescitate prepared for Provider review and signature  [] POLST/POST/MOLST/MOST prepared for Provider review and signature      Follow-up plan:    [] Schedule follow-up conversation to continue planning  [] Referred individual to Provider for additional questions/concerns   [] Advised patient/agent/surrogate to review completed ACP document and update if needed with changes in condition, patient preferences or care setting    [x] This note routed to one or more involved healthcare providers    Electronically signed by Jossie Hernandez on 12/6/2022 at 4:29 PM

## 2022-12-06 NOTE — PROGRESS NOTES
Clinical Pharmacy Note  Heparin Dosing       Lab Results   Component Value Date/Time    APTT 75.3 12/06/2022 02:31 AM     Lab Results   Component Value Date/Time    HGB 11.5 12/05/2022 05:59 AM    HCT 35.4 12/05/2022 05:59 AM     12/05/2022 05:59 AM    INR 0.98 12/05/2022 04:15 AM       Current Infusion Rate: 1580 units/hr    Plan:  Rate: continue at 1580 units/hr  Next aPTT: 0900 12/6/22    Pharmacy will continue to monitor and adjust based on aPTT results.    Juan Vasquez, PharmD

## 2022-12-06 NOTE — PROGRESS NOTES
[facility]   Daily Progress Note      Admit Date:  12/5/2022    CC: \"   This is a 49-year-old male with a history  of hypertension and hyperlipidemia presented to the hospital after he  apparently had episodes of unresponsiveness in last four to five days. He said on Thursday or Friday while he was eating at a [employer]  he supposedly had fell asleep or passed out and took about five hours to  wake up. He had _____ witnessing any of these episodes. He denied  having any chest pain, tightness, or pressure with this episode. He  again had an episode while he was at home on Saturday, when he was  taking shower and he apparently became unresponsive in the shower. He  refused to come to the hospital initially, but finally decided to come  to the hospital on Sunday. In any of these episode, he has no shortness  of breath, palpitation, or chest pain. In the emergency room, his EKG showed some new T-wave inversion. His  troponin was elevated, which has led to this cardiac consultation. He  did have a CT chest, which raised a possibility of feeling defect. The  patient has apparently had flu viral illness a few weeks ago. He has  also been complaining of slurring of his speech. Interval history 12/6/2022  Pt with no acute overnight events. Denies chest pain, palpitations, and dyspnea. Patient underwent CT PA on 12/5/2022. Is found to have acute low volume bilateral PE.     Objective:   BP (!) 166/84   Pulse 61   Temp 98.6 °F (37 °C) (Oral)   Resp 17   Wt 194 lb 3.6 oz (88.1 kg)   SpO2 99%   BMI 30.42 kg/m²     Intake/Output Summary (Last 24 hours) at 12/6/2022 1345  Last data filed at 12/6/2022 5089  Gross per 24 hour   Intake 2105.34 ml   Output --   Net 2105.34 ml     Wt Readings from Last 3 Encounters:   12/06/22 194 lb 3.6 oz (88.1 kg)   11/29/22 189 lb (85.7 kg)   11/17/22 191 lb 9.3 oz (86.9 kg)     Telemetry:NSR    Physical Exam:  General:  NAD, Awake, alert and oriented X4  Skin:  Warm and dry  Neck:  Supple, no JVP appreciated, no bruit  Chest:  Clear to auscultation, no wheezes/rhonchi/rales  Cardiovascular:  Regular rate. S1S2  Abdomen:  Soft, nontender, +bowel sounds  Extremities:  No LE edema    Cardiac Diagnosis:  hypertension and hyperlipidemia    Medications:    vitamin B-1  100 mg Oral Daily    sodium chloride flush  5-40 mL IntraVENous 2 times per day    aspirin  81 mg Oral Daily    atorvastatin  80 mg Oral Nightly    lisinopril  5 mg Oral Daily    nicotine  1 patch TransDERmal Daily      sodium chloride      heparin (PORCINE) Infusion Stopped (22 1202)     morphine, sodium chloride flush, sodium chloride, acetaminophen **OR** acetaminophen, perflutren lipid microspheres, ondansetron, hydrALAZINE, ALPRAZolam    Lab Data:  CBC:   Recent Labs     22  0203 22  0559 22  0442   WBC 10.3 7.5 10.2   HGB 13.9 11.5* 10.8*    188 206     BMP:    Recent Labs     22  0203 22  0559 22  0442    134* 142   K 4.0 3.9 4.0   CO2 27 24 25   BUN 28* 28* 13   CREATININE 1.3 1.0 0.8*     LIVR:   Recent Labs     22  0415   *   *     INR:    Recent Labs     22  0415   INR 0.98     APTT:   Recent Labs     22  0415 22  1819 22  0231   APTT 31.1 86.2* 75.3*     BNP:  No results for input(s): BNP in the last 72 hours. Imagin. Acute low volume pulmonary embolism involving the subsegmental pulmonary   arteries in bilateral lungs. The central pulmonary arteries are patent. There is no right ventricular strain. 2. Mild cardiomegaly with mild diffuse congestion in the lungs. 3. Mild mosaic lung attenuation may reflect air trapping or regional   perfusion differences. Echocardiogram 2022  Normal left ventricle cavity size. Mild concentric hypertrophy. Ejection fraction is visually estimated to be 55-60 %. Indeterminate diastolic function.    Mildly thickened mitral valve leaflets. The left atrium is mildly dilated. Mild aortic regurgitation. Mild right ventricle dilation. Normal right ventricle systolic function. .   The right atrium is dilated. Cyst seen in the liver measuring 3.67 cm x 5.26 cm. A bubble study was performed and fails to show evidence of shunting. Acute deep vein thrombosis involving the right mid to distal femoral vein. Thrombus is noted at the site of the fistula and just distal to it. 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 Assessment:Plan:  1) elevated troponin  -Most likely due to his acute low-volume PE.  -Since he had EKG changes along with troponin elevation and no evidence of wall motion abnormalities on the echocardiogram.  We will proceed with nuclear stress test to rule out ischemic heart disease  -Currently on IV heparin. .  -We will switch him to Xarelto if is nuclear stress test is negative for ischemia and if okay with vascular surgery    Hypertension  -Blood pressure is elevated  -Patient is currently off metoprolol due to bradycardia  -We will restart at 50 mg daily.  -Increase lisinopril to 10 mg daily. Acute DVT with AV fistula  -Patient currently on IV heparin.  -Awaiting recommendations from vascular surgery    Discussed with Dr. Bryn Alfred.   Will sign off    Electronically signed by Rubén Dupree MD on 12/6/2022 at 1:45 PM

## 2022-12-06 NOTE — CARE COORDINATION
DISCHARGE PLAN: Pt plans to return home with his spouse. [facility] information left with pt. No other needs noted at this time. ____________________________________  INITIAL ASSESSMENT  Met w/pt to address barriers to dc. Pt requested to call his spouse on the phone for this assessment. SW also spoke with pts spouse. HOME: Pt resides in a bi level home with his spouse. There are 7 RANJIT. Pts 21and 25year old sons also live in the home. Disease Specific: Syncope and collapse    COVID Vaccination: Yes      DME/O2: None. No DME needs noted. ACTIVE SERVICES: Pt was independent with all self care PTA and denied the need for any assistance upon d/c.  SW talked with pt about substance abuse. While pt did not freely admit to substance abuse, he was appreciative for the conversation about support and took information about [facility]. TRANSPORTATION: Pt is an active . Spouse will transport pt home at d/c. PHARMACY: Denies difficulty obtaining/taking meds  Pt has all meds filled at [facility] in [place]. PCP: Marie Guerrero MD     DEMOGRAPHICS: Verified address/phone number as correct    INSURANCE:  [facility]  PRESCRIPTION COVERAGE: [facility]    HD/PD: No      THERAPY RECS Not ordered      Discharge planning team will remain available for needs. Please consult for any specifics not addressed in this note.     DEVEN Barrios LSMANJIT  [phone]  Electronically signed by Mitch Raines on 12/6/2022 at 4:28 PM

## 2022-12-06 NOTE — PROGRESS NOTES
Pt transported to echo via stretcher. Pt is crying in pain. Md prev messaged.  Pt medicated with tylenol per mar

## 2022-12-06 NOTE — PLAN OF CARE
Problem: Discharge Planning  Goal: Discharge to home or other facility with appropriate resources  Outcome: Progressing  Flowsheets  Taken 12/5/2022 1954  Discharge to home or other facility with appropriate resources:   Identify barriers to discharge with patient and caregiver   Arrange for needed discharge resources and transportation as appropriate   Identify discharge learning needs (meds, wound care, etc)   Arrange for interpreters to assist at discharge as needed   Refer to discharge planning if patient needs post-hospital services based on physician order or complex needs related to functional status, cognitive ability or social support system  Taken 12/5/2022 1430  Discharge to home or other facility with appropriate resources: Identify barriers to discharge with patient and caregiver     Problem: ABCDS Injury Assessment  Goal: Absence of physical injury  Outcome: Progressing  Flowsheets (Taken 12/5/2022 1949)  Absence of Physical Injury: Implement safety measures based on patient assessment     Problem: Safety - Adult  Goal: Free from fall injury  Outcome: Progressing

## 2022-12-06 NOTE — PROGRESS NOTES
Pt states he is having difficulty urinating. Pt states it happens sometimes, could be nerves.  Md messaged

## 2022-12-06 NOTE — PROGRESS NOTES
This RN went to assess pt during shift change and pt stated the posterior right upper thigh was hurting worse than yesterday 10/10. Leg is hard, swollen and warm. MD messaged

## 2022-12-06 NOTE — PROGRESS NOTES
Hospitalist Progress Note      PCP: Tinnie Sever, MD    Chief Complaint. Syncopal episode, weight loss, possibly slight slurred speech    Date of Admission: 12/5/2022    Subjective:   Has R leg pain, swelling, denies chest pain, nausea, vomiting, shortness of breath, fever or chills. mention feels overall better    Medications:  Reviewed    Infusion Medications    sodium chloride      heparin (PORCINE) Infusion Stopped (12/06/22 1202)     Scheduled Medications    vitamin B-1  100 mg Oral Daily    sodium chloride flush  5-40 mL IntraVENous 2 times per day    aspirin  81 mg Oral Daily    atorvastatin  80 mg Oral Nightly    lisinopril  5 mg Oral Daily    nicotine  1 patch TransDERmal Daily     PRN Meds: morphine, sodium chloride flush, sodium chloride, acetaminophen **OR** acetaminophen, perflutren lipid microspheres, ondansetron, hydrALAZINE, ALPRAZolam      Intake/Output Summary (Last 24 hours) at 12/6/2022 1525  Last data filed at 12/6/2022 6474  Gross per 24 hour   Intake 2105.34 ml   Output --   Net 2105.34 ml       Physical Exam Performed:    BP (!) 169/91   Pulse 67   Temp 98.2 °F (36.8 °C) (Oral)   Resp 18   Wt 194 lb 3.6 oz (88.1 kg)   SpO2 99%   BMI 30.42 kg/m²     General appearance: No apparent distress,   HEENT:  Conjunctivae/corneas clear. Neck: Supple, with full range of motion. Respiratory:  Normal respiratory effort. Clear to auscultation, bilaterally without Rales/Wheezes/Rhonchi. Cardiovascular: Regular rate and rhythm with normal S1/S2 without murmurs or rubs  Abdomen: Soft, non-tender, non-distended, normal bowel sounds. Musculoskeletal: No cyanosis or edema bilaterally  Neurologic:  without any focal sensory/motor deficits.  grossly non-focal.  Psychiatric: Alert and oriented, Normal mood  Peripheral Pulses: +2 palpable, equal bilaterally       Labs:   Recent Labs     12/05/22  0203 12/05/22  0559 12/06/22  0442   WBC 10.3 7.5 10.2   HGB 13.9 11.5* 10.8*   HCT 42.5 35.4* 33.4*  188 206     Recent Labs     12/05/22  0203 12/05/22  0559 12/06/22  0442    134* 142   K 4.0 3.9 4.0    102 107   CO2 27 24 25   BUN 28* 28* 13   CREATININE 1.3 1.0 0.8*   CALCIUM 10.0 8.6 8.9   PHOS  --  3.2 2.6     Recent Labs     12/05/22  0415   *   *   BILIDIR <0.2   BILITOT 0.3   ALKPHOS 111     Recent Labs     12/05/22  0415   INR 0.98     Recent Labs     12/05/22  0415 12/05/22  0732 12/05/22  1002   TROPONINI 0.09* 0.09* 0.09*       Urinalysis:      Lab Results   Component Value Date/Time    BLOODU NEGATIVE 04/05/2022 03:52 PM    SPECGRAV 1.020 04/05/2022 03:52 PM    GLUCOSEU NEGATIVE 04/05/2022 03:52 PM       Radiology:  NM Cardiac Stress Test Nuclear Imaging   Final Result      VL Extremity Venous Right   Final Result      CT HEAD WO CONTRAST   Final Result   No acute intracranial abnormality. CT CHEST PULMONARY EMBOLISM W CONTRAST   Final Result   1. Acute low volume pulmonary embolism involving the subsegmental pulmonary   arteries in bilateral lungs. The central pulmonary arteries are patent. There is no right ventricular strain. 2. Mild cardiomegaly with mild diffuse congestion in the lungs. 3. Mild mosaic lung attenuation may reflect air trapping or regional   perfusion differences. Communications: The above critical/ time-sensitive findings were communicated   personally by radiology communications center/Dr. Nicolas Epperson to Dr. Godwin Brooke and   nurse Aileen Virgen at 10:10 a.m. 12/05/2022. CT CHEST ABDOMEN PELVIS W CONTRAST Additional Contrast? None   Final Result   Chest      There is a questionable filling defect seen in right upper lobe pulmonary   artery. However this finding could be artifactual from streak artifact from   contrast bolus in the SVC and the timing the study rather than a true tiny   pulmonary embolus. .  Correlate for any clinical risk factors of pulmonary   embolism.   Consider CT PA for further evaluation of this finding to see if it   persists. A few scattered ground-glass opacities are seen throughout the lungs. Ground-glass opacities are nonspecific can be due to atelectasis,   postinflammatory-infectious change or early edema. 5 mm noncalcified pulmonary nodule right lower lobe      Abdomen pelvis:      No acute intra-abdominal abnormality      The findings were sent to the Radiology Results [id number] at 6:03   am on 12/5/2022 to be communicated to a licensed caregiver. RECOMMENDATIONS:   5 mm right solid pulmonary nodule. No routine follow-up imaging is   recommended per Fleischner Society Guidelines. These guidelines do not apply to immunocompromised patients and patients with   cancer. Follow up in patients with significant comorbidities as clinically   warranted. For lung cancer screening, adhere to Lung-RADS guidelines. Reference: Radiology. 2017; 284(1):228-43. CT Head W/O Contrast   Final Result   1. No acute intracranial hemorrhage or wedge-shaped area of ischemia. 2. Intracranial atherosclerosis with mild patchy white matter low attenuation   suggestive of chronic microvascular ischemic change. 3. Chronic paranasal sinus disease. XR CHEST (2 VW)   Final Result   Clear chest without acute cardiopulmonary process.          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  Syncope  Elevated troponin  Hypertension  Dysarthria  Drug overdose on home xanax and opioids - per family patient has been taking more pills of xanax and opioids than he should      Plan  IV heparin to continue  Vascular surgery consulted for AV fistula - await recs   Plan for MRI per neurology  Stress test did not show signs of ischemia, cardiology signed off  Monitor on tele  DVT Prophylaxis: IV heparin  Diet: Diet NPO  Diet NPO  Code Status: Full Code    PT/OT Eval Status: ordered    Dispo/Plan of care - patient to be restarted on IV heparin, RN informed, discussed with cardiology    Michael Park MD

## 2022-12-06 NOTE — PROGRESS NOTES
No new orders. Report to nightshift RN. Pharmacy removed pt home meds and secured in inpatient pharmacy.

## 2022-12-06 NOTE — CONSULTS
Neurology Consult Note  Reason for Consult: slurred speech per wife    Chief complaint: R leg pain    Dr Renee Fuentes MD asked me to see Estuardo Hay in consultation for evaluation of slurred speech per wife    History of Present Illness:  Estuardo Hay is a 59 y.o. male who presents with leg pain. I obtained my information via interview w/ the patient, supplemented by chart review. The patient recently saw his PCP on the 29th and his effexor was increased from 150 mg daily to 225 mg daily. He did have a drug screen completed at the time which came back positive for multiple substances (benzodiazepines, fentanyl, oxycodone, and cannabinoids). He is not sure how some of the unprescribed substances tested positive. He says that on Thursday he went to  food at [employer] and woke up 5-6 hours later in his car to his wife calling him. He has no idea what happened. He was not confused. No tongue biting or incontinence. He says Saturday he got in the shower around 0700 and apparently was woken up around [address] by his wife on the floor in the bathroom. The water was still running. He was not confused. No tongue biting. No incontinence. His wife thought he was perhaps slurring his speech to he ended up coming to the ED later on. Initial BP here was 217/106. CT head negative. He was found to have a PE and started on a heparin gtt. A repeat head CT was done later in the day again for slurred speech which was negative. On Sunday he started to complain of RLE pain/swelling. An ultrasound completed today confirmed a DVT. Currently he feels OK. He does not think he his slurring his speech though apparently his wife does (she was not present). He did not report nor does he have any other focal neurologic complaints. No hx of stroke or seizure.       Medical History:  Past Medical History:   Diagnosis Date    Anxiety     Depression     Hyperlipidemia History     Tobacco Use   Smoking Status Every Day    Packs/day: 1.00    Types: Cigarettes    Start date: 0    Last attempt to quit: 12/15/2021    Years since quittin.9   Smokeless Tobacco Never     Social History     Substance and Sexual Activity   Drug Use No     Social History     Substance and Sexual Activity   Alcohol Use Yes    Alcohol/week: 6.0 standard drinks    Types: 3 Cans of beer, 3 Shots of liquor per week    Comment: social drink. ROS  Constitutional- No weight loss or fevers  Eyes- No diplopia. No photophobia. Ears/nose/throat- No dysphagia. No Dysarthria  Cardiovascular- No palpitations. No chest pain  Respiratory- No dyspnea. No Cough  Gastrointestinal- No Abdominal pain. No Vomiting. Genitourinary- No incontinence. No urinary retention  Musculoskeletal- No myalgia. No arthralgia  Skin- No rash. No easy bruising. Psychiatric- No depression. No anxiety  Endocrine- No diabetes. No thyroid issues. Hematologic- No bleeding difficulty. No fatigue  Neurologic- No weakness. No Headache. Exam  Blood pressure (!) 166/84, pulse 61, temperature 98.6 °F (37 °C), temperature source Oral, resp. rate 17, weight 194 lb 3.6 oz (88.1 kg), SpO2 99 %. Constitutional    Vital signs: BP, HR, and RR reviewed   General alert, no distress  Eyes: unable to visualize the fundi  Cardiovascular: + RLE swelling  Psychiatric: cooperative with examination, no psychotic behavior noted. Neurologic  Mental status:   orientation to person, place, time. General fund of knowledge grossly intact   Memory grossly intact   Attention intact as able to attend well to the exam     Language fluent in conversation   Comprehension intact; follows simple commands  Cranial nerves:   CN2: visual fields full   CN 3,4,6: extraocular muscles intact. Pupils are equal, round, reactive bilaterally.     CN5: facial sensation symmetric   CN7: face symmetric without dysarthria  CN8: hearing grossly intact  CN9: palate elevated symmetrically  CN11: trap full strength on shoulder shrug  CN12: tongue midline with protrusion  Strength: 5/5 BUE, LLE. RLE ROM is limited due to DVT and pain. Deep tendon reflexes: normal in all 4 extremities  Sensory: light touch intact in all 4 extremities. Cerebellar/coordination: finger nose finger normal without ataxia  Tone: normal in all 4 extremities  Gait: deferred at this time for safety. Labs  Glucose 105  Na 142  K 4.0  BUN 13  Cr 0.8  Mg 2.00    CRP 39.3  ESR 39  Troponin 0.09          WBC 10.2K  Hg 10.8  Platelets 604    Studies  CT head w/o 12/5/22, independently reviewed  Impression   No acute intracranial abnormality. Impression/Recommendations  Reported dysarthria. Prolonged episodes of altered awareness/consciousness. Pulmonary embolism. RLE DVT/fistula. Elevated inflammatory markers. Elevated LFTs. Hypertensive urgency. I did not appreciate any focal neurologic deficits on my exam.  The etiology of his reported dysarthria is unclear (ischemia versus medication effect versus hypertensive versus other). It would be important to rule out stroke so will order a brain MRI w/o. Considered an EEG though not convinced that these episodes are seizure. The other above medial issues to be addressed by primary. Could also consider a Hematology consult as well.       Caitlin Ulloa NP  [address] [place] [address] [address] Neurology    A copy of this note was provided for Dr Casimiro Paget, MD

## 2022-12-06 NOTE — PROGRESS NOTES
This RN held heparin and d/c normal saline per MD order. Pt returned to room 4108 from vascular and immediately transported to Nuclear medicine.  Pt transported via stretcher

## 2022-12-06 NOTE — PROGRESS NOTES
Clinical Pharmacy Note  Heparin Dosing       Lab Results   Component Value Date/Time    APTT 86.2 12/05/2022 06:19 PM     Lab Results   Component Value Date/Time    HGB 11.5 12/05/2022 05:59 AM    HCT 35.4 12/05/2022 05:59 AM     12/05/2022 05:59 AM    INR 0.98 12/05/2022 04:15 AM       Current Infusion Rate: 1580 units/hr    Plan:  Rate: 1580 units/hr  Next aPTT: 0100 12/6/22    Pharmacy will continue to monitor and adjust based on aPTT results.

## 2022-12-06 NOTE — PLAN OF CARE
Problem: Discharge Planning  Goal: Discharge to home or other facility with appropriate resources  12/5/2022 2353 by Mildred Ziegler RN  Outcome: Progressing  12/5/2022 1957 by Samantha Garza RN  Outcome: Progressing  Flowsheets  Taken 12/5/2022 1954  Discharge to home or other facility with appropriate resources:   Identify barriers to discharge with patient and caregiver   Arrange for needed discharge resources and transportation as appropriate   Identify discharge learning needs (meds, wound care, etc)   Arrange for interpreters to assist at discharge as needed   Refer to discharge planning if patient needs post-hospital services based on physician order or complex needs related to functional status, cognitive ability or social support system  Taken 12/5/2022 1430  Discharge to home or other facility with appropriate resources: Identify barriers to discharge with patient and caregiver     Problem: ABCDS Injury Assessment  Goal: Absence of physical injury  12/5/2022 2353 by Mildred Ziegler RN  Outcome: Progressing  Flowsheets (Taken 12/5/2022 2352)  Absence of Physical Injury: Implement safety measures based on patient assessment  12/5/2022 1957 by Samantha Garza RN  Outcome: Progressing  Flowsheets (Taken 12/5/2022 1949)  Absence of Physical Injury: Implement safety measures based on patient assessment     Problem: Safety - Adult  Goal: Free from fall injury  12/5/2022 2353 by Mildred Ziegler RN  Outcome: Progressing  [address] (Taken 12/5/2022 2352)  Free From Fall Injury: Instruct family/caregiver on patient safety  12/5/2022 1957 by Samantha Garza RN  Outcome: Progressing

## 2022-12-06 NOTE — CONSULTS
[facility] Vascular and Endovascular Surgery  Consultation Note    12/6/2022 2:24 PM    Chief Complaint: Loss of Consciousness     Reason for Consultation: Right Femoral AVF    History of Present Illness:  Patient is a 59 y.o. male who presented to the ED on 12/5/2022 with complaints of Loss of Consciousness. He has a significant PMH of Anxiety, Depression, HLD, HTN, Bilateral ACL Repair and Left Knee Arthroscopy. The patient tells me over the past week or so, he fell asleep in his car for several hours and over the weekend he fell asleep in the shower for a few hours. When his wife found him he did not remember falling asleep. His wife also reported he was having some slurred speech as well. He did recently have some medications adjustments as well. He tells me he has had bilateral ACL's repaired and it is noted he had a Right Femoral Nerve Block in 2014 with one of his surgeries with no reported complications at that time. The patient was found to have PE's on CT Chest and is complaining of RLE Swelling and Pain at rest and thus a Venous Duplex was ordered. The Venous Duplex was notable for an Acute DVT of the Right Mid-Distal Femoral Vein and an AV-Fistula between the Femoral Artery and Femoral Vein. The patient denies any issues with his Right Leg in the past and reports his Right Leg Pain began when he came into the hospital. He tells me his mother did have PE's but he is unsure of any further details surrounding that situation. He denies any personal known history of DVT or PE. We have been consulted to evaluate the patient for a Right Femoral AVF. At present, he is seen resting in bed, he is alert and oriented and appears to be in stable condition. Review of Systems  Review of Systems   Constitutional: Negative. HENT: Negative. Respiratory:  Negative for shortness of breath. Cardiovascular:  Positive for leg swelling. RLE Swelling   Gastrointestinal: Negative. Musculoskeletal: Negative. Skin: Negative. Neurological:         Loss of Consciousness prior to admission   Psychiatric/Behavioral: Negative. Past Medical History:   Diagnosis Date    Anxiety     Depression     Hyperlipidemia     Hypertension     borderline     Osteoarthritis     knees    Torn ACL 2003    left, right    Torn meniscus     left, right       Past Surgical History:   Procedure Laterality Date    COLONOSCOPY N/A 2021    COLONOSCOPY DIAGNOSTIC performed by Sagar Barrera MD at [address] [place] [address]      tooth extraction    KNEE ARTHROSCOPY Left        No Known Allergies    Social History     Socioeconomic History    Marital status:      Spouse name: Not on file    Number of children: Not on file    Years of education: Not on file    Highest education level: Not on file   Occupational History    Not on file   Tobacco Use    Smoking status: Every Day     Packs/day: 1.00     Types: Cigarettes     Start date: 0     Last attempt to quit: 12/15/2021     Years since quittin.9    Smokeless tobacco: Never   Vaping Use    Vaping Use: Former   Substance and Sexual Activity    Alcohol use: Yes     Alcohol/week: 6.0 standard drinks     Types: 3 Cans of beer, 3 Shots of liquor per week     Comment: social drink. Drug use: No    Sexual activity: Yes     Partners: Female   Other Topics Concern    Not on file   Social History Narrative    Not on file     Social Determinants of Health     Financial Resource Strain: Low Risk     Difficulty of Paying Living Expenses: Not hard at all   Food Insecurity: No Food Insecurity    Worried About [address] in the Last Year: Never true    [address] in the Last Year: Never true   Transportation Needs: No Transportation Needs    Lack of Transportation (Medical): No    Lack of Transportation (Non-Medical):  No   Physical Activity: Not on file   Stress: Not on file   Social Connections: Not on file   Intimate Partner Violence: Not on file   Housing Stability: Not on file       Family History   Problem Relation Age of Onset    Other Mother         Pulmonary embolism     Other Father         does not know     Cancer Maternal Grandfather         abdominal cancer of some kind        Vital Signs  Vitals:    12/06/22 0519 12/06/22 0611 12/06/22 1330 12/06/22 1404   BP: (!) 192/97 (!) 166/84 (!) 197/97 (!) 169/91   Pulse: 58 61 57 67   Resp: 17   18   Temp: 98.6 °F (37 °C)   98.2 °F (36.8 °C)   TempSrc: Oral   Oral   SpO2: 99%   99%   Weight: 194 lb 3.6 oz (88.1 kg)          I/O:    Intake/Output Summary (Last 24 hours) at 12/6/2022 1424  Last data filed at 12/6/2022 2108  Gross per 24 hour   Intake 2105.34 ml   Output --   Net 2105.34 ml       Physical Exam:  General: no apparent distress, alert and oriented, afebrile  Psychiatric: withdrawn  Head/Eyes/Ears/Nose/Throat: normocephalic and atraumatic, face symmetric, normal hearing, nose - negative  Neck: normal appearance and no deformity, supple, trachea midline  Chest/Lungs: clear to auscultation bilaterally, non labored breathing no tachypnea, retractions or cyanosis  Cardiac: Heart regular rate and rhythm  Abdomen: soft, nondistended, active bowel sounds, and nontender  Extremities: normal strength, tone, and muscle mass, no significant edema to LLE  -RLE with +2/4 edema noted to thigh area, thigh with swelling, tenderness and tightness, able to move foot and toes without difficulty, sensation to foot and toes intact  Vascular: extremities warm and well perfused, no signs of cyanosis or ischemia, lower extremity motorsensory intact, capillary refill < 3 seconds  Pulses:  -R Radial: +2/4 palpable   -L Radial: +2/4 palpable  -R DP: +2/4 palpable  -L DP: +2/4 palpable    Labs  Lab Results   Component Value Date/Time    WBC 10.2 12/06/2022 04:42 AM    HGB 10.8 12/06/2022 04:42 AM    HCT 33.4 12/06/2022 04:42 AM    MCV 92.9 12/06/2022 04:42 AM     12/06/2022 04:42 AM     Lab Results   Component Value Date/Time verbalized understanding and agreed with the plan. Thank you for allowing to us to participate in the care of Maryann Mosley      Electronically signed by ROXANA Lord CNP on 12/6/2022 at 2:24 PM    VASCULAR STAFF    Patient evaluated at the same time in conjunction with the ACP or resident of which I performed greater than 50% of the history, physical exam, and/or medical decision making:    Patient with unusual pain and swelling two days ago  Presented to hospital for syncope, workup ongoing   His right leg abruptly began swelling  He did experience two separate falls however leading up to his hospitalization as a consequence of syncope therefore traumatic injury cannot be ruled out  On exam he has medial and posterior thigh fullness, no significant edema or bruising, calf soft, all motor and sensory function normal  Pedal pulse is palpable  Plan: will obtain formal CTA to interrogate and isolate this fistulous communication, in the absence of prior angiography would have to assume this is traumatic in nature therefore it might close but patient does have evidence of DVT therefore anticoagulation may cause this communication to endure. No signs or symptoms suggestive of acute vascular emergency. Will obtain CTA and follow up with patient tomorrow.    Please call with questions or concerns    Stacie Kennedy DO, FACS, FSVS, [address] [facility] Vascular and Endovascular Surgery

## 2022-12-06 NOTE — PROGRESS NOTES
Pt medicated for pain via Mar./91. Pt alert and oriented. MRI checklist complete. MRI called to inform that pt has left titanium knee and pins in right knee. I was unable to edit, so MRI tech corrected. Heparin drip is currently held d/t order.

## 2022-12-06 NOTE — PROGRESS NOTES
Speech Language Pathology    8:39 AM:  Swallow eval attempted. Patient NPO for procedure anticipated for later this date. ST to hold swallow evaluation this date unless otherwise notified of clearance for PO trials. ST to re-attempt 12/7/2022.    12:15 PM:  Patient discussed with RN. Patient remains NPO and out of room for diagnostics/procedures. RN continues to recommend plan to re-attempt 12/7/2022 unless otherwise notified. Thank you. Christine Pedro, [id number] [place] [address], [id number]  Speech-Language Pathologist  Portable phone: [phone]

## 2022-12-07 ENCOUNTER — APPOINTMENT (OUTPATIENT)
Dept: GENERAL RADIOLOGY | Age: 57
DRG: 176 | End: 2022-12-07
Payer: COMMERCIAL

## 2022-12-07 LAB
ALBUMIN SERPL-MCNC: 3.3 G/DL (ref 3.4–5)
ANION GAP SERPL CALCULATED.3IONS-SCNC: 14 MMOL/L (ref 3–16)
APTT: 107.3 SEC (ref 23–34.3)
APTT: 77.3 SEC (ref 23–34.3)
BASOPHILS ABSOLUTE: 0 K/UL (ref 0–0.2)
BASOPHILS RELATIVE PERCENT: 0.2 %
BUN BLDV-MCNC: 12 MG/DL (ref 7–20)
CALCIUM SERPL-MCNC: 8.5 MG/DL (ref 8.3–10.6)
CHLORIDE BLD-SCNC: 104 MMOL/L (ref 99–110)
CO2: 23 MMOL/L (ref 21–32)
CREAT SERPL-MCNC: 0.8 MG/DL (ref 0.9–1.3)
EOSINOPHILS ABSOLUTE: 0 K/UL (ref 0–0.6)
EOSINOPHILS RELATIVE PERCENT: 0.1 %
GFR SERPL CREATININE-BSD FRML MDRD: >60 ML/MIN/{1.73_M2}
GLUCOSE BLD-MCNC: 110 MG/DL (ref 70–99)
HCT VFR BLD CALC: 29 % (ref 40.5–52.5)
HEMOGLOBIN: 9.5 G/DL (ref 13.5–17.5)
LACTIC ACID: 2.3 MMOL/L (ref 0.4–2)
LYMPHOCYTES ABSOLUTE: 0.8 K/UL (ref 1–5.1)
LYMPHOCYTES RELATIVE PERCENT: 8.8 %
MAGNESIUM: 1.7 MG/DL (ref 1.8–2.4)
MCH RBC QN AUTO: 29.9 PG (ref 26–34)
MCHC RBC AUTO-ENTMCNC: 32.8 G/DL (ref 31–36)
MCV RBC AUTO: 91.2 FL (ref 80–100)
MONOCYTES ABSOLUTE: 0.6 K/UL (ref 0–1.3)
MONOCYTES RELATIVE PERCENT: 7.4 %
NEUTROPHILS ABSOLUTE: 7.2 K/UL (ref 1.7–7.7)
NEUTROPHILS RELATIVE PERCENT: 83.5 %
PDW BLD-RTO: 15.9 % (ref 12.4–15.4)
PHOSPHORUS: 3.5 MG/DL (ref 2.5–4.9)
PLATELET # BLD: 192 K/UL (ref 135–450)
PMV BLD AUTO: 7.9 FL (ref 5–10.5)
POTASSIUM SERPL-SCNC: 3.8 MMOL/L (ref 3.5–5.1)
PROCALCITONIN: 0.2 NG/ML (ref 0–0.15)
RBC # BLD: 3.18 M/UL (ref 4.2–5.9)
SODIUM BLD-SCNC: 141 MMOL/L (ref 136–145)
WBC # BLD: 8.6 K/UL (ref 4–11)

## 2022-12-07 PROCEDURE — 6360000002 HC RX W HCPCS: Performed by: INTERNAL MEDICINE

## 2022-12-07 PROCEDURE — 71045 X-RAY EXAM CHEST 1 VIEW: CPT

## 2022-12-07 PROCEDURE — 85730 THROMBOPLASTIN TIME PARTIAL: CPT

## 2022-12-07 PROCEDURE — 6370000000 HC RX 637 (ALT 250 FOR IP): Performed by: INTERNAL MEDICINE

## 2022-12-07 PROCEDURE — 1200000000 HC SEMI PRIVATE

## 2022-12-07 PROCEDURE — 99231 SBSQ HOSP IP/OBS SF/LOW 25: CPT | Performed by: STUDENT IN AN ORGANIZED HEALTH CARE EDUCATION/TRAINING PROGRAM

## 2022-12-07 PROCEDURE — 80069 RENAL FUNCTION PANEL: CPT

## 2022-12-07 PROCEDURE — 84145 PROCALCITONIN (PCT): CPT

## 2022-12-07 PROCEDURE — 2500000003 HC RX 250 WO HCPCS: Performed by: INTERNAL MEDICINE

## 2022-12-07 PROCEDURE — 87040 BLOOD CULTURE FOR BACTERIA: CPT

## 2022-12-07 PROCEDURE — 83735 ASSAY OF MAGNESIUM: CPT

## 2022-12-07 PROCEDURE — APPNB30 APP NON BILLABLE TIME 0-30 MINS: Performed by: NURSE PRACTITIONER

## 2022-12-07 PROCEDURE — 6370000000 HC RX 637 (ALT 250 FOR IP): Performed by: STUDENT IN AN ORGANIZED HEALTH CARE EDUCATION/TRAINING PROGRAM

## 2022-12-07 PROCEDURE — 94760 N-INVAS EAR/PLS OXIMETRY 1: CPT

## 2022-12-07 PROCEDURE — 36415 COLL VENOUS BLD VENIPUNCTURE: CPT

## 2022-12-07 PROCEDURE — 85025 COMPLETE CBC W/AUTO DIFF WBC: CPT

## 2022-12-07 PROCEDURE — 83605 ASSAY OF LACTIC ACID: CPT

## 2022-12-07 PROCEDURE — 2580000003 HC RX 258: Performed by: STUDENT IN AN ORGANIZED HEALTH CARE EDUCATION/TRAINING PROGRAM

## 2022-12-07 RX ORDER — VENLAFAXINE HYDROCHLORIDE 75 MG/1
225 CAPSULE, EXTENDED RELEASE ORAL DAILY
Status: DISCONTINUED | OUTPATIENT
Start: 2022-12-07 | End: 2022-12-07

## 2022-12-07 RX ORDER — VENLAFAXINE HYDROCHLORIDE 75 MG/1
150 CAPSULE, EXTENDED RELEASE ORAL DAILY
Status: DISCONTINUED | OUTPATIENT
Start: 2022-12-08 | End: 2022-12-10 | Stop reason: HOSPADM

## 2022-12-07 RX ORDER — CHOLECALCIFEROL (VITAMIN D3) 10 MCG
1 TABLET ORAL DAILY
Status: DISCONTINUED | OUTPATIENT
Start: 2022-12-07 | End: 2022-12-10 | Stop reason: HOSPADM

## 2022-12-07 RX ORDER — DICYCLOMINE HYDROCHLORIDE 10 MG/1
10 CAPSULE ORAL 3 TIMES DAILY PRN
Status: DISCONTINUED | OUTPATIENT
Start: 2022-12-07 | End: 2022-12-07 | Stop reason: SDUPTHER

## 2022-12-07 RX ORDER — LABETALOL HYDROCHLORIDE 5 MG/ML
10 INJECTION, SOLUTION INTRAVENOUS EVERY 4 HOURS PRN
Status: DISCONTINUED | OUTPATIENT
Start: 2022-12-07 | End: 2022-12-10 | Stop reason: HOSPADM

## 2022-12-07 RX ADMIN — MORPHINE SULFATE 1 MG: 2 INJECTION, SOLUTION INTRAMUSCULAR; INTRAVENOUS at 09:03

## 2022-12-07 RX ADMIN — METHOCARBAMOL 750 MG: 750 TABLET ORAL at 13:44

## 2022-12-07 RX ADMIN — Medication 3 MG: at 20:10

## 2022-12-07 RX ADMIN — MORPHINE SULFATE 1 MG: 2 INJECTION, SOLUTION INTRAMUSCULAR; INTRAVENOUS at 20:10

## 2022-12-07 RX ADMIN — ATORVASTATIN CALCIUM 80 MG: 80 TABLET, FILM COATED ORAL at 20:10

## 2022-12-07 RX ADMIN — Medication 100 MG: at 09:03

## 2022-12-07 RX ADMIN — VENLAFAXINE HYDROCHLORIDE 150 MG: 75 CAPSULE, EXTENDED RELEASE ORAL at 15:28

## 2022-12-07 RX ADMIN — ACETAMINOPHEN 650 MG: 325 TABLET, FILM COATED ORAL at 03:03

## 2022-12-07 RX ADMIN — SODIUM CHLORIDE, PRESERVATIVE FREE 10 ML: 5 INJECTION INTRAVENOUS at 20:10

## 2022-12-07 RX ADMIN — LISINOPRIL 10 MG: 10 TABLET ORAL at 13:41

## 2022-12-07 RX ADMIN — ACETAMINOPHEN 650 MG: 325 TABLET, FILM COATED ORAL at 17:22

## 2022-12-07 RX ADMIN — RIVAROXABAN 15 MG: 15 TABLET, FILM COATED ORAL at 17:23

## 2022-12-07 RX ADMIN — HEPARIN SODIUM 1930 UNITS/HR: 10000 INJECTION, SOLUTION INTRAVENOUS at 04:24

## 2022-12-07 RX ADMIN — ASPIRIN 81 MG: 81 TABLET, CHEWABLE ORAL at 09:03

## 2022-12-07 RX ADMIN — NEPHROCAP 1 MG: 1 CAP ORAL at 15:28

## 2022-12-07 ASSESSMENT — PAIN DESCRIPTION - ORIENTATION
ORIENTATION: RIGHT

## 2022-12-07 ASSESSMENT — PAIN DESCRIPTION - ONSET: ONSET: PROGRESSIVE

## 2022-12-07 ASSESSMENT — PAIN DESCRIPTION - LOCATION
LOCATION: LEG

## 2022-12-07 ASSESSMENT — PAIN DESCRIPTION - PAIN TYPE: TYPE: ACUTE PAIN

## 2022-12-07 ASSESSMENT — PAIN DESCRIPTION - FREQUENCY: FREQUENCY: CONTINUOUS

## 2022-12-07 ASSESSMENT — PAIN SCALES - GENERAL
PAINLEVEL_OUTOF10: 10
PAINLEVEL_OUTOF10: 9
PAINLEVEL_OUTOF10: 7

## 2022-12-07 ASSESSMENT — PAIN DESCRIPTION - DESCRIPTORS
DESCRIPTORS: ACHING
DESCRIPTORS: ACHING

## 2022-12-07 NOTE — PROGRESS NOTES
Pt states that he only wants Effexor 150 mg ordered. Pt states that he thinks the additional 75 mg contributed to his hospital admission. Md messaged.

## 2022-12-07 NOTE — PROGRESS NOTES
Fever most likely from non- infectious source like hematoma and thrombus, will order Blood cultures, CXR, lactic acid.  UA negative for UTI    Holding off of abx for now

## 2022-12-07 NOTE — PROGRESS NOTES
Clinical Pharmacy Note  Heparin Dosing       Lab Results   Component Value Date/Time    APTT 40.2 12/06/2022 09:33 PM     Lab Results   Component Value Date/Time    HGB 10.8 12/06/2022 04:42 AM    HCT 33.4 12/06/2022 04:42 AM     12/06/2022 04:42 AM    INR 0.98 12/05/2022 04:15 AM       Current Infusion Rate: 1580 units/hr    Plan:  Bolus: 7000 units  Rate: increase to 1930 units/hr  Next aPTT: 0500 12/7/22    Pharmacy will continue to monitor and adjust based on aPTT results.    Jakob Ocampo, [facility], PharmD, 12/6/2022 10:39 PM

## 2022-12-07 NOTE — PROGRESS NOTES
Hospitalist Progress Note      PCP: Meghan Maza MD    Chief Complaint. Syncopal episode, weight loss, possibly slight slurred speech    Date of Admission: 12/5/2022    Subjective:   Has R leg pain, swelling, denies chest pain, nausea, vomiting, shortness of breath, fever or chills. mention feels overall better    Medications:  Reviewed    Infusion Medications    sodium chloride      heparin (PORCINE) Infusion 1,840 Units/hr (12/07/22 0706)     Scheduled Medications    vitamin B complex  1 tablet Oral Daily    venlafaxine  225 mg Oral Daily    lisinopril  10 mg Oral Daily    melatonin  3 mg Oral Nightly    vitamin B-1  100 mg Oral Daily    sodium chloride flush  5-40 mL IntraVENous 2 times per day    aspirin  81 mg Oral Daily    atorvastatin  80 mg Oral Nightly    nicotine  1 patch TransDERmal Daily     PRN Meds: dicyclomine, morphine, methocarbamol, loperamide, dicyclomine, hydrOXYzine pamoate, promethazine, ALPRAZolam, sodium chloride flush, sodium chloride, acetaminophen **OR** acetaminophen, perflutren lipid microspheres, ondansetron, hydrALAZINE      Intake/Output Summary (Last 24 hours) at 12/7/2022 1402  Last data filed at 12/7/2022 0416  Gross per 24 hour   Intake 230.9 ml   Output 975 ml   Net -744.1 ml         Physical Exam Performed:    BP (!) 164/78   Pulse (!) 110   Temp 98.3 °F (36.8 °C) (Oral)   Resp 18   Wt 177 lb 11.1 oz (80.6 kg)   SpO2 99%   BMI 27.83 kg/m²     General appearance: NAD  HEENT:  Conjunctivae/corneas clear. Neck: Supple, with full range of motion. Respiratory:  Normal respiratory effort. Clear to auscultation, bilaterally without Rales/Wheezes/Rhonchi. Cardiovascular: Regular rate and rhythm with normal S1/S2 without murmurs or rubs  Abdomen: Soft, non-tender, non-distended, normal bowel sounds. Musculoskeletal: No cyanosis or edema bilaterally  Neurologic:  without any focal sensory/motor deficits.  grossly non-focal.  Psychiatric: Alert and oriented, Normal mood  Peripheral Pulses: +2 palpable, equal bilaterally       Labs:   Recent Labs     12/05/22  0559 12/06/22  0442 12/07/22  0437   WBC 7.5 10.2 8.6   HGB 11.5* 10.8* 9.5*   HCT 35.4* 33.4* 29.0*    206 192       Recent Labs     12/05/22  0559 12/06/22  0442 12/07/22  0437   * 142 141   K 3.9 4.0 3.8    107 104   CO2 24 25 23   BUN 28* 13 12   CREATININE 1.0 0.8* 0.8*   CALCIUM 8.6 8.9 8.5   PHOS 3.2 2.6 3.5       Recent Labs     12/05/22  0415   *   *   BILIDIR <0.2   BILITOT 0.3   ALKPHOS 111       Recent Labs     12/05/22  0415   INR 0.98       Recent Labs     12/05/22  0415 12/05/22  0732 12/05/22  1002   TROPONINI 0.09* 0.09* 0.09*         Urinalysis:      Lab Results   Component Value Date/Time    NITRU Negative 12/06/2022 11:00 PM    BLOODU Negative 12/06/2022 11:00 PM    SPECGRAV 1.026 12/06/2022 11:00 PM    GLUCOSEU Negative 12/06/2022 11:00 PM       Radiology:  MRI BRAIN W CONTRAST   Final Result   No abnormal enhancement. CTA LOWER EXTREMITY RIGHT W CONTRAST   Final Result   1. Large right thigh hematoma but no evidence of active bleeding or   pseudoaneurysm. No evidence of AV fistula. 2. Unremarkable arterial circulation of the right lower extremity. No   evidence of arterial aneurysm, dissection or occlusion. MRI BRAIN WO CONTRAST   Final Result   No acute infarct. Nonspecific focus of rounded signal abnormality within the subcortical matter   white matter of the left frontal lobe. Suggest a repeat study with IV contrast for further evaluation. NM Cardiac Stress Test Nuclear Imaging   Final Result      VL Extremity Venous Right   Final Result      CT HEAD WO CONTRAST   Final Result   No acute intracranial abnormality. CT CHEST PULMONARY EMBOLISM W CONTRAST   Final Result   1. Acute low volume pulmonary embolism involving the subsegmental pulmonary   arteries in bilateral lungs. The central pulmonary arteries are patent. There is no right ventricular strain. 2. Mild cardiomegaly with mild diffuse congestion in the lungs. 3. Mild mosaic lung attenuation may reflect air trapping or regional   perfusion differences. Communications: The above critical/ time-sensitive findings were communicated   personally by radiology communications center/Dr. Galvez Serve to Dr. Ishan Huang and   nurse Kuldeep Pacheco at 10:10 a.m. 12/05/2022. CT CHEST ABDOMEN PELVIS W CONTRAST Additional Contrast? None   Final Result   Chest      There is a questionable filling defect seen in right upper lobe pulmonary   artery. However this finding could be artifactual from streak artifact from   contrast bolus in the SVC and the timing the study rather than a true tiny   pulmonary embolus. .  Correlate for any clinical risk factors of pulmonary   embolism. Consider CT PA for further evaluation of this finding to see if it   persists. A few scattered ground-glass opacities are seen throughout the lungs. Ground-glass opacities are nonspecific can be due to atelectasis,   postinflammatory-infectious change or early edema. 5 mm noncalcified pulmonary nodule right lower lobe      Abdomen pelvis:      No acute intra-abdominal abnormality      The findings were sent to the Radiology Results [id number] at 6:03   am on 12/5/2022 to be communicated to a licensed caregiver. RECOMMENDATIONS:   5 mm right solid pulmonary nodule. No routine follow-up imaging is   recommended per Fleischner Society Guidelines. These guidelines do not apply to immunocompromised patients and patients with   cancer. Follow up in patients with significant comorbidities as clinically   warranted. For lung cancer screening, adhere to Lung-RADS guidelines. Reference: Radiology. 2017; 284(1):228-43. CT Head W/O Contrast   Final Result   1. No acute intracranial hemorrhage or wedge-shaped area of ischemia.    2. Intracranial atherosclerosis with mild patchy white matter low attenuation   suggestive of chronic microvascular ischemic change. 3. Chronic paranasal sinus disease. XR CHEST (2 VW)   Final Result   Clear chest without acute cardiopulmonary process. Assessment/Plan:    Active Hospital Problems    Diagnosis     Arteriovenous fistula (HCC) [I77.0]      Priority: Medium    Syncope [R55]      Priority: Medium    HTN (hypertension) [I10]      Priority: Medium    Elevated troponin [R77.8]      Priority: Medium    Syncope and collapse [R55]      Priority: Medium     PE/R leg DVT  AV fistula R femoral artery - femoral vein  Large R thigh hematoma   Syncope  Elevated troponin  Hypertension  Dysarthria  Drug overdose on home xanax and opioids - per family patient has been taking more pills of xanax and opioids than he should      Plan  DC IV heparin, switch to PO xarelto  Vascular surgery consulted for AV fistula - recs no further intervention as Fistula negative on CTA   MRI per neurology - unremarkable for CVA  Stress test did not show signs of ischemia, cardiology signed off  Monitor on tele  DVT Prophylaxis: xarelto  Diet: ADULT DIET;  Regular  Code Status: Full Code    PT/OT Eval Status: ordered    Dispo/Plan of care - cardiology signed off, consult PT/OT, may need placement, restart effexor, start xarelto, dc 1-2 days    Zully Colon MD

## 2022-12-07 NOTE — PROGRESS NOTES
[facility]  Progress Note    12/7/2022 12:29 PM    Chief Complaint: Loss of Consciousness      Reason for Consultation: Right Femoral AVF    Subjective: Pt seen resting in bed, he reports he is able to walk slightly better today compared to yesterday and reports his pain is somewhat improved to his right thigh    Vital Signs:  Vitals:    12/07/22 0300 12/07/22 0836 12/07/22 0849 12/07/22 1126   BP: (!) 174/76  (!) 164/79 (!) 164/78   Pulse: 95  84 (!) 110   Resp: 20 18 18   Temp: 100 °F (37.8 °C)  98.6 °F (37 °C) 98.3 °F (36.8 °C)   TempSrc: Oral  Oral Oral   SpO2: 99% 99% 98% 99%   Weight: 177 lb 11.1 oz (80.6 kg)          I/O:    Intake/Output Summary (Last 24 hours) at 12/7/2022 1229  Last data filed at 12/7/2022 0416  Gross per 24 hour   Intake 230.9 ml   Output 975 ml   Net -744.1 ml       Physical Exam:   General: no apparent distress, alert and oriented, afebrile  Chest/Lungs: non labored breathing no tachypnea, retractions or cyanosis  Extremities: normal strength, tone, and muscle mass, no significant edema to LLE  -RLE with edema noted to thigh area - softer than yesterday, ACE wrap in place, mild tenderness, calf soft and non tender, able to move foot and toes without difficulty, sensation to foot and toes intact  Vascular: extremities warm and well perfused, no signs of cyanosis or ischemia, lower extremity motorsensory intact  Pulses:  -R DP: +2/4 palpable    Labs:   Lab Results   Component Value Date/Time     12/07/2022 04:37 AM    K 3.8 12/07/2022 04:37 AM    K 4.0 12/05/2022 02:03 AM     12/07/2022 04:37 AM    CO2 23 12/07/2022 04:37 AM    BUN 12 12/07/2022 04:37 AM    CREATININE 0.8 12/07/2022 04:37 AM    GFRAA >60 10/14/2022 04:11 PM    LABGLOM >60 12/07/2022 04:37 AM    GLUCOSE 110 12/07/2022 04:37 AM    PHOS 3.5 12/07/2022 04:37 AM    MG 1.70 12/07/2022 04:37 AM    CALCIUM 8.5 12/07/2022 04:37 AM     Lab Results   Component Value Date/Time    WBC 8.6 12/07/2022 04:37 AM    RBC 3.18 12/07/2022 04:37 AM    HGB 9.5 12/07/2022 04:37 AM    HCT 29.0 12/07/2022 04:37 AM    MCV 91.2 12/07/2022 04:37 AM    RDW 15.9 12/07/2022 04:37 AM     12/07/2022 04:37 AM     Lab Results   Component Value Date    INR 0.98 12/05/2022    PROTIME 12.9 12/05/2022        Scheduled Meds:    lisinopril  10 mg Oral Daily    melatonin  3 mg Oral Nightly    vitamin B-1  100 mg Oral Daily    sodium chloride flush  5-40 mL IntraVENous 2 times per day    aspirin  81 mg Oral Daily    atorvastatin  80 mg Oral Nightly    nicotine  1 patch TransDERmal Daily     Continuous Infusions:    sodium chloride      heparin (PORCINE) Infusion 1,840 Units/hr (12/07/22 0706)       Imaging:   CTA RLE - 12/6/2022  Impression  1. Large right thigh hematoma but no evidence of active bleeding or  pseudoaneurysm. No evidence of AV fistula. 2. Unremarkable arterial circulation of the right lower extremity. No evidence of arterial aneurysm, dissection or occlusion. RLE Venous Duplex - 12/6/2022  Summary     Acute deep vein thrombosis involving the right mid to distal femoral vein. Thrombus is noted at the site of the fistula and just distal to it. 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 CT Chest PE - 12/5/2022  Impression  1. Acute low volume pulmonary embolism involving the subsegmental pulmonary arteries in bilateral lungs. The central pulmonary arteries are patent. There is no right ventricular strain. 2. Mild cardiomegaly with mild diffuse congestion in the lungs. 3. Mild mosaic lung attenuation may reflect air trapping or regional perfusion differences.      Assessment:  -Right Femoral Artery and Femoral Vein AV Fistula - not seen on CTA  -Large Right Thigh Hematoma noted on CTA  -Right Femoral DVT  -Right Thigh Swelling and Tenderness  -Palpable Right DP pulse with intact sensation and motor function  -Right Thigh slightly improved from yesterday     Plan:   -Agree with anticoagulation for 6 months - okay to switch to DOAC of choice - will defer to Primary Team  -No further intervention needed regarding Fistula as it was not seen on CTA  -ACE wrap to RLE to help with swelling and hematoma decompression  -Pt to monitor Right Thigh closely for any changes or increased swelling  -Okay to D/C home from Vascular Surgery standpoint whenever otherwise medically stable  -Pt can call to schedule a F/U with Dr. Shanna Patrick in 4 weeks    All pertinent information and plan of care discussed with Dr. Fariba Solano. All questions and concerns were addressed with the patient. I have discussed the above stated plan with the patient. The patient verbalized understanding and agreed with the plan.     Thank you for allowing to us to participate in the care of Markos Early      Electronically signed by ROXANA Barrow CNP on 12/7/2022 at 12:29 PM     VASCULAR STAFF    Patient evaluated at the same time in conjunction with the ACP or resident of which I performed greater than 50% of the history, physical exam, and/or medical decision making:    CTA personally reviewed   No discrete evidence of AV fistula, or early venous filling  On exam, patient's thigh softer today and less tender with leg wrapping  Large right thigh intramuscular hematoma which explains his discomfort and associated swelling  Suspect AV fistula and DVT are cause of traumatic injury, would anticoagulate for 6 months and only interrogate fistula if persistent leg swelling; would anticipate closure spontaneously once off anticoagulation  Continue to wrap leg until hematoma resolves  Nothing further from Vascular      Fariba Solano, , FACS, FSVS, [address] [facility]

## 2022-12-07 NOTE — PROGRESS NOTES
Clinical Pharmacy Note  Heparin Dosing       Lab Results   Component Value Date/Time    APTT 107.3 12/07/2022 04:37 AM     Lab Results   Component Value Date/Time    HGB 9.5 12/07/2022 04:37 AM    HCT 29.0 12/07/2022 04:37 AM     12/07/2022 04:37 AM    INR 0.98 12/05/2022 04:15 AM       Current Infusion Rate: 1930 units/hr    Plan:  Rate: decrease to 1840 units/hr  Next aPTT: 0500 12/7/22    Pharmacy will continue to monitor and adjust based on aPTT results.    Jennifer Callahan, [facility], 12/7/2022 6:43 AM

## 2022-12-07 NOTE — PLAN OF CARE
Problem: Discharge Planning  Goal: Discharge to home or other facility with appropriate resources  12/7/2022 1130 by Matthew Levi RN  Outcome: Progressing  Flowsheets (Taken 12/7/2022 0800)  Discharge to home or other facility with appropriate resources: Identify barriers to discharge with patient and caregiver  12/7/2022 0011 by Sage Chamberlain RN  Outcome: Progressing     Problem: ABCDS Injury Assessment  Goal: Absence of physical injury  12/7/2022 1130 by Matthew Levi RN  Outcome: Progressing  12/7/2022 0011 by Sage Chamberlain RN  Outcome: Progressing     Problem: Safety - Adult  Goal: Free from fall injury  12/7/2022 1130 by Matthew Levi RN  Outcome: Progressing  12/7/2022 0011 by Sage Chamberlain RN  Outcome: Progressing     Problem: Pain  Goal: Verbalizes/displays adequate comfort level or baseline comfort level  Outcome: Progressing

## 2022-12-07 NOTE — PROGRESS NOTES
Speech Language Pathology    Swallow evaluation re-attempted. Patient with order for NPO after midnight. ST to hold swallow evaluation until clarification received re: clearance for PO. RN reports patient tolerating regular diet texture with thin liquids without difficulty. If swallow evaluation is no longer indicated, please discontinue order. Thank you. Christine Louis, [address] [facility], [id number]  Speech-Language Pathologist  Portable phone: [phone]

## 2022-12-07 NOTE — PROGRESS NOTES
Pt wife called and this RN updated on poc. All questions answered. Pt wife states pt takes effexor and requests that it be ordered. Pt has not had since admission. MD messaged

## 2022-12-08 LAB
ALBUMIN SERPL-MCNC: 3.2 G/DL (ref 3.4–5)
ANION GAP SERPL CALCULATED.3IONS-SCNC: 11 MMOL/L (ref 3–16)
BASOPHILS ABSOLUTE: 0 K/UL (ref 0–0.2)
BASOPHILS RELATIVE PERCENT: 0.2 %
BUN BLDV-MCNC: 13 MG/DL (ref 7–20)
CALCIUM SERPL-MCNC: 8.1 MG/DL (ref 8.3–10.6)
CHLORIDE BLD-SCNC: 98 MMOL/L (ref 99–110)
CO2: 24 MMOL/L (ref 21–32)
CREAT SERPL-MCNC: 0.8 MG/DL (ref 0.9–1.3)
EOSINOPHILS ABSOLUTE: 0 K/UL (ref 0–0.6)
EOSINOPHILS RELATIVE PERCENT: 0 %
GFR SERPL CREATININE-BSD FRML MDRD: >60 ML/MIN/{1.73_M2}
GLUCOSE BLD-MCNC: 110 MG/DL (ref 70–99)
HCT VFR BLD CALC: 26.7 % (ref 40.5–52.5)
HEMOGLOBIN: 8.8 G/DL (ref 13.5–17.5)
LACTIC ACID: 2 MMOL/L (ref 0.4–2)
LYMPHOCYTES ABSOLUTE: 0.7 K/UL (ref 1–5.1)
LYMPHOCYTES RELATIVE PERCENT: 10.6 %
MAGNESIUM: 1.7 MG/DL (ref 1.8–2.4)
MCH RBC QN AUTO: 30.1 PG (ref 26–34)
MCHC RBC AUTO-ENTMCNC: 33.1 G/DL (ref 31–36)
MCV RBC AUTO: 90.9 FL (ref 80–100)
MONOCYTES ABSOLUTE: 0.7 K/UL (ref 0–1.3)
MONOCYTES RELATIVE PERCENT: 10.5 %
NEUTROPHILS ABSOLUTE: 5 K/UL (ref 1.7–7.7)
NEUTROPHILS RELATIVE PERCENT: 78.7 %
PDW BLD-RTO: 16.8 % (ref 12.4–15.4)
PHOSPHORUS: 3.4 MG/DL (ref 2.5–4.9)
PLATELET # BLD: 168 K/UL (ref 135–450)
PMV BLD AUTO: 8.1 FL (ref 5–10.5)
POTASSIUM SERPL-SCNC: 3.7 MMOL/L (ref 3.5–5.1)
RBC # BLD: 2.94 M/UL (ref 4.2–5.9)
SODIUM BLD-SCNC: 133 MMOL/L (ref 136–145)
URINE CULTURE, ROUTINE: NORMAL
WBC # BLD: 6.3 K/UL (ref 4–11)

## 2022-12-08 PROCEDURE — 83605 ASSAY OF LACTIC ACID: CPT

## 2022-12-08 PROCEDURE — 97166 OT EVAL MOD COMPLEX 45 MIN: CPT

## 2022-12-08 PROCEDURE — 97162 PT EVAL MOD COMPLEX 30 MIN: CPT

## 2022-12-08 PROCEDURE — 83735 ASSAY OF MAGNESIUM: CPT

## 2022-12-08 PROCEDURE — 6360000002 HC RX W HCPCS: Performed by: INTERNAL MEDICINE

## 2022-12-08 PROCEDURE — 6370000000 HC RX 637 (ALT 250 FOR IP): Performed by: INTERNAL MEDICINE

## 2022-12-08 PROCEDURE — 6370000000 HC RX 637 (ALT 250 FOR IP): Performed by: STUDENT IN AN ORGANIZED HEALTH CARE EDUCATION/TRAINING PROGRAM

## 2022-12-08 PROCEDURE — 94760 N-INVAS EAR/PLS OXIMETRY 1: CPT

## 2022-12-08 PROCEDURE — 97530 THERAPEUTIC ACTIVITIES: CPT

## 2022-12-08 PROCEDURE — 80069 RENAL FUNCTION PANEL: CPT

## 2022-12-08 PROCEDURE — 2580000003 HC RX 258: Performed by: STUDENT IN AN ORGANIZED HEALTH CARE EDUCATION/TRAINING PROGRAM

## 2022-12-08 PROCEDURE — 85025 COMPLETE CBC W/AUTO DIFF WBC: CPT

## 2022-12-08 PROCEDURE — 36415 COLL VENOUS BLD VENIPUNCTURE: CPT

## 2022-12-08 PROCEDURE — 1200000000 HC SEMI PRIVATE

## 2022-12-08 RX ORDER — LANOLIN ALCOHOL/MO/W.PET/CERES
400 CREAM (GRAM) TOPICAL 2 TIMES DAILY
Status: DISCONTINUED | OUTPATIENT
Start: 2022-12-08 | End: 2022-12-10 | Stop reason: HOSPADM

## 2022-12-08 RX ADMIN — Medication 100 MG: at 08:51

## 2022-12-08 RX ADMIN — RIVAROXABAN 15 MG: 15 TABLET, FILM COATED ORAL at 08:51

## 2022-12-08 RX ADMIN — MAGNESIUM OXIDE 400 MG (241.3 MG MAGNESIUM) TABLET 400 MG: TABLET at 20:20

## 2022-12-08 RX ADMIN — Medication 3 MG: at 20:20

## 2022-12-08 RX ADMIN — VENLAFAXINE HYDROCHLORIDE 150 MG: 75 CAPSULE, EXTENDED RELEASE ORAL at 08:51

## 2022-12-08 RX ADMIN — METHOCARBAMOL 750 MG: 750 TABLET ORAL at 14:01

## 2022-12-08 RX ADMIN — LISINOPRIL 10 MG: 10 TABLET ORAL at 13:31

## 2022-12-08 RX ADMIN — NEPHROCAP 1 MG: 1 CAP ORAL at 08:51

## 2022-12-08 RX ADMIN — ACETAMINOPHEN 650 MG: 325 TABLET, FILM COATED ORAL at 17:41

## 2022-12-08 RX ADMIN — ATORVASTATIN CALCIUM 80 MG: 80 TABLET, FILM COATED ORAL at 20:20

## 2022-12-08 RX ADMIN — SODIUM CHLORIDE, PRESERVATIVE FREE 10 ML: 5 INJECTION INTRAVENOUS at 20:21

## 2022-12-08 RX ADMIN — MORPHINE SULFATE 1 MG: 2 INJECTION, SOLUTION INTRAMUSCULAR; INTRAVENOUS at 06:04

## 2022-12-08 RX ADMIN — RIVAROXABAN 15 MG: 15 TABLET, FILM COATED ORAL at 16:46

## 2022-12-08 RX ADMIN — MAGNESIUM OXIDE 400 MG (241.3 MG MAGNESIUM) TABLET 400 MG: TABLET at 08:51

## 2022-12-08 RX ADMIN — ALPRAZOLAM 0.5 MG: 0.5 TABLET ORAL at 20:20

## 2022-12-08 ASSESSMENT — PAIN SCALES - GENERAL
PAINLEVEL_OUTOF10: 7
PAINLEVEL_OUTOF10: 6
PAINLEVEL_OUTOF10: 3
PAINLEVEL_OUTOF10: 0

## 2022-12-08 ASSESSMENT — PAIN DESCRIPTION - LOCATION
LOCATION: LEG

## 2022-12-08 ASSESSMENT — PAIN DESCRIPTION - ORIENTATION
ORIENTATION: RIGHT

## 2022-12-08 NOTE — PROGRESS NOTES
Occupational Therapy  Facility/Department: [facility]  Occupational Therapy Initial Assessment/Discharge    Name: Nita Perez  : 1965  MRN: [id number]  Date of Service: 2022    Discharge Recommendations:  Home with assist PRN      Nita Perez scored a 23/24 on the AM-PAC ADL Inpatient form. At this time, no further OT is recommended upon discharge due to pt near baseline. Recommend patient returns to prior setting with prior services. Patient Diagnosis(es): The primary encounter diagnosis was Syncope and collapse. A diagnosis of Elevated troponin was also pertinent to this visit. Past Medical History:  has a past medical history of Anxiety, Depression, Hyperlipidemia, Hypertension, Osteoarthritis, Torn ACL, and Torn meniscus. Past Surgical History:  has a past surgical history that includes Knee arthroscopy (Left); Dental surgery; and Colonoscopy (N/A, 2021). Assessment   Assessment: Pt is a 58 yo M admitted with multiple syncopal episodes, weight loss, possible slurred speech. PTA, pt lives at home w/ his wife and 2 sons where he is typically independent in self-care and functional mobility and still working FT. He is functioning close to baseline today, completing functional transfers and mobility w/ supervision using crutches, and standing grooming w/ supervision. No acute OT needs identified, will sign off. Anticipate safe return home w/ assist PRN when medically stable. Decision Making: Low Complexity  REQUIRES OT FOLLOW-UP: No  Activity Tolerance  Activity Tolerance: Patient Tolerated treatment well        Plan   Occupational Therapy Plan  Times Per Week: d/c OT     Subjective   General  Chart Reviewed: Yes  Patient assessed for rehabilitation services?: Yes (Simultaneous filing.  User may not have seen previous data.)  Additional Pertinent Hx: per H&P: \"The patient is a 59 y.o. male with past [facility] as below who presents to [facility] with concern per himself and the family that over the last few days he has had 2 episodes where he has apparently passed out once when he was waiting at a [employer] and apparently it took him about 5 hours to wake up to answer his wife's phone call while he was parked and apparently a second time while at home\"  Family / Caregiver Present: No (though wife on speakerphone throughout session)  Referring Practitioner: Rachael  Diagnosis: Syncope  Subjective  Subjective: Pt met b/s for OT eval/tx w/ PT. Pt in bed, agreeable to therapy. Pt denied pain  General Comment  Comments: Per RN ok to see     Social/Functional History  Social/Functional History  Lives With: Spouse, Son (2 teenage sons)  Type of Home: House  Home Layout: Two level (bi-level)  Home Access: Stairs to enter with rails  Entrance Stairs - Number of Steps: 7  Bathroom Shower/Tub: Tub/Shower unit  Bathroom Toilet: Standard  Home Equipment: [address]  ADL Assistance: Independent  Homemaking Assistance: Independent  Ambulation Assistance: Independent  Transfer Assistance: Independent  Active : Yes  Mode of Transportation: Car  Occupation: Full time employment  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       Objective   Safety Devices  Type of Devices: Call light within reach; Left in chair;Nurse notified        AROM: Within functional limits  PROM: Within functional limits  Strength:  Within functional limits  Coordination: Within functional limits  ADL  Grooming: Supervision  Grooming Skilled Clinical Factors: Pt stood at the sink x8 mins for oral care, face washing, and deodorant  Additional Comments: Anticipate pt would require no more than supervision for full ADL based on ROM, strength, endurance this session     Activity Tolerance  Activity Tolerance: Patient tolerated treatment well;Patient limited by pain  Bed mobility  Supine to Sit: Modified independent  Sit to Supine: Unable to assess (in recliner at end of session)  Transfers  Sit to stand: Supervision  Stand to sit: Supervision  Transfer Comments: Pt completed functional mobility from bed > BR > recliner w/ crutches (pt preferred crutches over RW).  No LOB noted  Hearing  Hearing: Within functional limits  Cognition  Overall Cognitive Status: WFL  Orientation  Overall Orientation Status: Within Functional Limits                  Education Given To: Patient  Education Provided: Role of Therapy;Transfer Training  Education Method: Verbal;Demonstration  Barriers to Learning: None  Education Outcome: Verbalized understanding           AM-PAC Score  AM-PAC Inpatient Daily Activity Raw Score: 23 (12/08/22 1023)  AM-PAC Inpatient ADL T-Scale Score : 51.12 (12/08/22 1023)  ADL Inpatient CMS 0-100% Score: 15.86 (12/08/22 1023)  ADL Inpatient CMS G-Code Modifier : CI (12/08/22 1023)      Goals  Short Term Goals  Time Frame for Short Term Goals: No acute care OT goals identified, pt near baseline  Patient Goals   Patient goals : to go home       Therapy Time   Individual Concurrent Group Co-treatment   Time In 0940         Time Out 1010         Minutes 30         Timed Code Treatment Minutes: [address]

## 2022-12-08 NOTE — PLAN OF CARE
Problem: Discharge Planning  Goal: Discharge to home or other facility with appropriate resources  12/8/2022 0031 by Americo Bruno RN  Outcome: Progressing  12/7/2022 1130 by Femi Wei RN  Outcome: Progressing  Flowsheets (Taken 12/7/2022 0800)  Discharge to home or other facility with appropriate resources: Identify barriers to discharge with patient and caregiver     Problem: ABCDS Injury Assessment  Goal: Absence of physical injury  12/8/2022 0031 by Americo Bruno RN  Outcome: Progressing  12/7/2022 1130 by Femi Wei RN  Outcome: Progressing     Problem: Safety - Adult  Goal: Free from fall injury  12/8/2022 0031 by Americo Bruno RN  Outcome: Progressing  12/7/2022 1130 by Femi Wei RN  Outcome: Progressing     Problem: Pain  Goal: Verbalizes/displays adequate comfort level or baseline comfort level  12/8/2022 0031 by Americo Bruno RN  Outcome: Progressing  12/7/2022 1130 by Femi Wei RN  Outcome: Progressing

## 2022-12-08 NOTE — PROGRESS NOTES
Physical Therapy  Facility/Department: [facility]  Physical Therapy Initial Assessment    Name: Reinaldo Romeo  : 1965  MRN: [id number]  Date of Service: 2022    Discharge Recommendations:  Home with assist PRN   PT Equipment Recommendations  Equipment Needed: Yes  Mobility Devices: Crutches  Crutches: Kiet Romeo requires crutches due to impaired ambulation and for increased stability in order to participate in or complete daily living tasks of: ambulating. The patient is able to safely use the crutches and the functional mobility deficit can be sufficiently resolved. Patient Diagnosis(es): The primary encounter diagnosis was Syncope and collapse. A diagnosis of Elevated troponin was also pertinent to this visit. Past Medical History:  has a past medical history of Anxiety, Depression, Hyperlipidemia, Hypertension, Osteoarthritis, Torn ACL, and Torn meniscus. Past Surgical History:  has a past surgical history that includes Knee arthroscopy (Left); Dental surgery; and Colonoscopy (N/A, 2021). Assessment   Body Structures, Functions, Activity Limitations Requiring Skilled Therapeutic Intervention: Decreased functional mobility   Assessment: The pt is a 59 y.o. male who presented to the ED on 22 secondary to recently falling asleep in his car for several hours falling asleep in the shower for a few hours. The patient was found to have PE's on CT Chest and is complaining of RLE Swelling and Pain at rest and thus a Venous Duplex was ordered. The Venous Duplex was notable for an Acute DVT of the Right Mid-Distal Femoral Vein and an AV-Fistula between the Femoral Artery and Femoral Vein. Prior to admission, pt living with family in home setting, independent with ADLs and ambulation without device, working full time. Pt currently limited by RLE pain. Anticipate return home with PRN assist. Will need axillary crutches.   Treatment Diagnosis: impaired mobility  Therapy Prognosis: Good  Decision Making: Medium Complexity  History: see above  Exam: see below  Clinical Presentation: evolving  Requires PT Follow-Up: Yes  Activity Tolerance  Activity Tolerance: Patient tolerated treatment well;Patient limited by pain     Plan   Physcial Therapy Plan  General Plan: 2-3 times per week  Current Treatment Recommendations: Strengthening, Balance training, Functional mobility training, Transfer training, Gait training, Endurance training, Stair training, Neuromuscular re-education, Patient/Caregiver education & training, Equipment evaluation, education, & procurement, Safety education & training, Therapeutic activities  Safety Devices  Type of Devices: Call light within reach, Left in chair, Nurse notified       Subjective   General  Chart Reviewed: Yes  Patient assessed for rehabilitation services?: Yes (Simultaneous filing. User may not have seen previous data.)  Additional Pertinent Hx: The pt is a 59 y.o. male who presented to the ED on 12/5/22 secondary to recently falling asleep in his car for several hours falling asleep in the shower for a few hours. The patient was found to have PE's on CT Chest and is complaining of RLE Swelling and Pain at rest and thus a Venous Duplex was ordered. The Venous Duplex was notable for an Acute DVT of the Right Mid-Distal Femoral Vein and an AV-Fistula between the Femoral Artery and Femoral Vein. Response To Previous Treatment: Not applicable  Family / Caregiver Present: No (however spouse on speaker phone)  Referring Practitioner: Migdalia Ohara MD  Referral Date : 12/08/22  Diagnosis: syncope  Follows Commands: Within Functional Limits  Subjective  Subjective: Pt is agreeable to PT - reports RLE pain has improved.          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    Vision/Hearing  Hearing  Hearing: Within functional limits      Cognition   Orientation  Overall Orientation Status: Within Functional Limits  Cognition  Overall Cognitive Status: WFL     Objective   Gross Assessment  Strength: Generally decreased, functional (RLE decreased strength - likely due to pain)     Bed mobility  Supine to Sit: Modified independent  Sit to Supine: Unable to assess (in recliner at end of session)  Transfers  Sit to Stand: Supervision  Stand to Sit: Supervision  Ambulation  Surface: Level tile  Device: Axillary Crutches  Assistance: Supervision  Gait Deviations: Slow Alanis;Decreased step length;Decreased step height  Distance: 10' + 25'  Comments: Pt stood at sink to brush dentures     Balance  Posture: Good  Sitting - Static: Good  Sitting - Dynamic: Good  Standing - Static: Good  Standing - Dynamic: Good;-  Comments:  Will be using RW with [facility] staff           AM-PAC Score  AM-PAC Inpatient Mobility Raw Score : 21 (12/08/22 1021)  AM-PAC Inpatient T-Scale Score : 50.25 (12/08/22 1021)  Mobility Inpatient CMS 0-100% Score: 28.97 (12/08/22 1021)  Mobility Inpatient CMS G-Code Modifier : CJ (12/08/22 1021)          Goals  Short Term Goals  Time Frame for Short Term Goals: by acute discharge  Short Term Goal 1: ambulate > 50' independently, no device  Short Term Goal 2: ascend/descend 7 steps with rail and SBA  Patient Goals   Patient Goals : none stated       Education  Patient Education  Education Given To: Patient  Education Provided: Role of Therapy;Plan of Care  Education Method: Verbal  Barriers to Learning: None  Education Outcome: Verbalized understanding      Therapy Time   Individual Concurrent Group Co-treatment   Time In 0940         Time Out 1010         Minutes 30         Timed Code Treatment Minutes: 15 Minutes       Kassidy Kaufman, PT

## 2022-12-08 NOTE — PLAN OF CARE
Problem: Discharge Planning  Goal: Discharge to home or other facility with appropriate resources  12/8/2022 1226 by Hema Tejada RN  Outcome: Progressing  12/8/2022 0031 by Didi Rodriguez RN  Outcome: Progressing     Problem: ABCDS Injury Assessment  Goal: Absence of physical injury  12/8/2022 1226 by Hema Tejada RN  Outcome: Progressing  12/8/2022 0031 by Didi Rodriguez RN  Outcome: Progressing     Problem: Safety - Adult  Goal: Free from fall injury  12/8/2022 1226 by Hema Tejada RN  Outcome: Progressing  12/8/2022 0031 by Didi Rodriguez RN  Outcome: Progressing     Problem: Pain  Goal: Verbalizes/displays adequate comfort level or baseline comfort level  12/8/2022 1226 by Hema Tejada RN  Outcome: Progressing  12/8/2022 0031 by Didi Rodriguez RN  Outcome: Progressing

## 2022-12-08 NOTE — PROGRESS NOTES
Hospitalist Progress Note      PCP: Yudith Fong MD    Chief Complaint. Syncopal episode, weight loss, possibly slight slurred speech    Date of Admission: 12/5/2022    Subjective: Continues to spike fever. As high as 100.4 this morning. The patient said he has not been ambulated since admission. Right hematoma much improved. Pain well controlled    Medications:  Reviewed    Infusion Medications    sodium chloride       Scheduled Medications    magnesium oxide  400 mg Oral BID    b complex-C-folic acid  1 mg Oral Daily    rivaroxaban  15 mg Oral BID WC    venlafaxine  150 mg Oral Daily    lisinopril  10 mg Oral Daily    melatonin  3 mg Oral Nightly    vitamin B-1  100 mg Oral Daily    sodium chloride flush  5-40 mL IntraVENous 2 times per day    atorvastatin  80 mg Oral Nightly    nicotine  1 patch TransDERmal Daily     PRN Meds: labetalol, morphine, methocarbamol, loperamide, dicyclomine, hydrOXYzine pamoate, promethazine, ALPRAZolam, sodium chloride flush, sodium chloride, acetaminophen **OR** acetaminophen, perflutren lipid microspheres, ondansetron, hydrALAZINE    No intake or output data in the 24 hours ending 12/08/22 1050    Physical Exam Performed:    BP (!) 144/79   Pulse 91   Temp 99.1 °F (37.3 °C) (Oral)   Resp 18   Wt 177 lb 11.1 oz (80.6 kg)   SpO2 97%   BMI 27.83 kg/m²       GEN No acute distress. HEENT moist mucous membranes, no icterus  RESP CTA B  CVS:   RRR  GI/ S/NT/ND BS+   MSK right inner thigh areas tense and slightly swollen  SKIN Normal coloration, warm, dry. NEURO no focal deficit  PSYCH Awake, alert, oriented x3.       Labs:   Recent Labs     12/06/22 0442 12/07/22  0437 12/08/22  0547   WBC 10.2 8.6 6.3   HGB 10.8* 9.5* 8.8*   HCT 33.4* 29.0* 26.7*    192 168     Recent Labs     12/06/22  0442 12/07/22  0437 12/08/22  0547    141 133*   K 4.0 3.8 3.7    104 98*   CO2 25 23 24   BUN 13 12 13   CREATININE 0.8* 0.8* 0.8*   CALCIUM 8.9 8.5 8.1* PHOS 2.6 3.5 3.4     No results for input(s): AST, ALT, BILIDIR, BILITOT, ALKPHOS in the last 72 hours. No results for input(s): INR in the last 72 hours. No results for input(s): Rosana Fuse in the last 72 hours. Urinalysis:      Lab Results   Component Value Date/Time    NITRU Negative 12/06/2022 11:00 PM    BLOODU Negative 12/06/2022 11:00 PM    SPECGRAV 1.026 12/06/2022 11:00 PM    GLUCOSEU Negative 12/06/2022 11:00 PM       Radiology:  XR CHEST 1 VIEW   Final Result   No acute abnormality identified. MRI BRAIN W CONTRAST   Final Result   No abnormal enhancement. CTA LOWER EXTREMITY RIGHT W CONTRAST   Final Result   1. Large right thigh hematoma but no evidence of active bleeding or   pseudoaneurysm. No evidence of AV fistula. 2. Unremarkable arterial circulation of the right lower extremity. No   evidence of arterial aneurysm, dissection or occlusion. MRI BRAIN WO CONTRAST   Final Result   No acute infarct. Nonspecific focus of rounded signal abnormality within the subcortical matter   white matter of the left frontal lobe. Suggest a repeat study with IV contrast for further evaluation. NM Cardiac Stress Test Nuclear Imaging   Final Result      VL Extremity Venous Right   Final Result      CT HEAD WO CONTRAST   Final Result   No acute intracranial abnormality. CT CHEST PULMONARY EMBOLISM W CONTRAST   Final Result   1. Acute low volume pulmonary embolism involving the subsegmental pulmonary   arteries in bilateral lungs. The central pulmonary arteries are patent. There is no right ventricular strain. 2. Mild cardiomegaly with mild diffuse congestion in the lungs. 3. Mild mosaic lung attenuation may reflect air trapping or regional   perfusion differences. Communications:  The above critical/ time-sensitive findings were communicated   personally by radiology communications center/Dr. Neomia Romberg to Dr. Liliana Overton and   nurse Osvaldo Caldwell at 10:10 a.m. 12/05/2022. CT CHEST ABDOMEN PELVIS W CONTRAST Additional Contrast? None   Final Result   Chest      There is a questionable filling defect seen in right upper lobe pulmonary   artery. However this finding could be artifactual from streak artifact from   contrast bolus in the SVC and the timing the study rather than a true tiny   pulmonary embolus. .  Correlate for any clinical risk factors of pulmonary   embolism. Consider CT PA for further evaluation of this finding to see if it   persists. A few scattered ground-glass opacities are seen throughout the lungs. Ground-glass opacities are nonspecific can be due to atelectasis,   postinflammatory-infectious change or early edema. 5 mm noncalcified pulmonary nodule right lower lobe      Abdomen pelvis:      No acute intra-abdominal abnormality      The findings were sent to the Radiology Results [address] at 6:03   am on 12/5/2022 to be communicated to a licensed caregiver. RECOMMENDATIONS:   5 mm right solid pulmonary nodule. No routine follow-up imaging is   recommended per Fleischner Society Guidelines. These guidelines do not apply to immunocompromised patients and patients with   cancer. Follow up in patients with significant comorbidities as clinically   warranted. For lung cancer screening, adhere to Lung-RADS guidelines. Reference: Radiology. 2017; 284(1):228-43. CT Head W/O Contrast   Final Result   1. No acute intracranial hemorrhage or wedge-shaped area of ischemia. 2. Intracranial atherosclerosis with mild patchy white matter low attenuation   suggestive of chronic microvascular ischemic change. 3. Chronic paranasal sinus disease. XR CHEST (2 VW)   Final Result   Clear chest without acute cardiopulmonary process.                Assessment/Plan:    Active Hospital Problems    Diagnosis     Arteriovenous fistula ([address] [I77.0]      Priority: Medium    Syncope [R55]      Priority: Medium HTN (hypertension) [I10]      Priority: Medium    Elevated troponin [R77.8]      Priority: Medium    Syncope and collapse [R55]      Priority: Medium     PE/R leg DVT  AV fistula R femoral artery - femoral vein  Large R thigh hematoma   Syncope  Elevated troponin  Hypertension  Dysarthria  Hypomagnesemia  Drug overdose on home xanax and opioids - per family patient has been taking more pills of xanax and opioids than he should    Plan:  Off heparin drip and now on p.o. Xarelto. Continue. Monitor H&H  Vascular surgery consulted for AV fistula - recs no further intervention as Fistula negative on CTA   MRI per neurology - unremarkable for CVA  Stress test did not show signs of ischemia, cardiology signed off  Replete electrolyte  Consult PT and OT  Awaiting work-up for infection. Cultures pending. Monitor off antibiotics for now. Lactic acid yesterday 2.3 review recheck level. Fever curve could be due to thrombus    Diet: ADULT DIET;  Regular  Code Status: Full Code    Dispo/Plan of care -Home with home health likely in the next couple of days depending on infectious work-up    Randy Mason MD, MD

## 2022-12-09 LAB
ALBUMIN SERPL-MCNC: 2.8 G/DL (ref 3.4–5)
ANION GAP SERPL CALCULATED.3IONS-SCNC: 12 MMOL/L (ref 3–16)
BASOPHILS ABSOLUTE: 0 K/UL (ref 0–0.2)
BASOPHILS RELATIVE PERCENT: 0.2 %
BUN BLDV-MCNC: 12 MG/DL (ref 7–20)
CALCIUM SERPL-MCNC: 8 MG/DL (ref 8.3–10.6)
CHLORIDE BLD-SCNC: 100 MMOL/L (ref 99–110)
CO2: 22 MMOL/L (ref 21–32)
CREAT SERPL-MCNC: 0.6 MG/DL (ref 0.9–1.3)
EOSINOPHILS ABSOLUTE: 0 K/UL (ref 0–0.6)
EOSINOPHILS RELATIVE PERCENT: 0.1 %
GFR SERPL CREATININE-BSD FRML MDRD: >60 ML/MIN/{1.73_M2}
GLUCOSE BLD-MCNC: 98 MG/DL (ref 70–99)
HCT VFR BLD CALC: 23.9 % (ref 40.5–52.5)
HEMOGLOBIN: 7.9 G/DL (ref 13.5–17.5)
LYMPHOCYTES ABSOLUTE: 0.6 K/UL (ref 1–5.1)
LYMPHOCYTES RELATIVE PERCENT: 11.5 %
MAGNESIUM: 1.9 MG/DL (ref 1.8–2.4)
MCH RBC QN AUTO: 30 PG (ref 26–34)
MCHC RBC AUTO-ENTMCNC: 33 G/DL (ref 31–36)
MCV RBC AUTO: 90.8 FL (ref 80–100)
MONOCYTES ABSOLUTE: 0.4 K/UL (ref 0–1.3)
MONOCYTES RELATIVE PERCENT: 7.8 %
NEUTROPHILS ABSOLUTE: 4 K/UL (ref 1.7–7.7)
NEUTROPHILS RELATIVE PERCENT: 80.4 %
ORGANISM: ABNORMAL
PDW BLD-RTO: 16.3 % (ref 12.4–15.4)
PHOSPHORUS: 3.4 MG/DL (ref 2.5–4.9)
PLATELET # BLD: 165 K/UL (ref 135–450)
PMV BLD AUTO: 7.9 FL (ref 5–10.5)
POTASSIUM SERPL-SCNC: 3.7 MMOL/L (ref 3.5–5.1)
PROCALCITONIN: 0.15 NG/ML (ref 0–0.15)
RBC # BLD: 2.63 M/UL (ref 4.2–5.9)
REPORT: NORMAL
RESPIRATORY PANEL PCR: ABNORMAL
SODIUM BLD-SCNC: 134 MMOL/L (ref 136–145)
WBC # BLD: 4.9 K/UL (ref 4–11)

## 2022-12-09 PROCEDURE — 6370000000 HC RX 637 (ALT 250 FOR IP): Performed by: STUDENT IN AN ORGANIZED HEALTH CARE EDUCATION/TRAINING PROGRAM

## 2022-12-09 PROCEDURE — 83735 ASSAY OF MAGNESIUM: CPT

## 2022-12-09 PROCEDURE — 2580000003 HC RX 258: Performed by: STUDENT IN AN ORGANIZED HEALTH CARE EDUCATION/TRAINING PROGRAM

## 2022-12-09 PROCEDURE — 0202U NFCT DS 22 TRGT SARS-COV-2: CPT

## 2022-12-09 PROCEDURE — 6370000000 HC RX 637 (ALT 250 FOR IP): Performed by: INTERNAL MEDICINE

## 2022-12-09 PROCEDURE — 99254 IP/OBS CNSLTJ NEW/EST MOD 60: CPT | Performed by: INTERNAL MEDICINE

## 2022-12-09 PROCEDURE — 97116 GAIT TRAINING THERAPY: CPT

## 2022-12-09 PROCEDURE — 1200000000 HC SEMI PRIVATE

## 2022-12-09 PROCEDURE — 97110 THERAPEUTIC EXERCISES: CPT

## 2022-12-09 PROCEDURE — 80069 RENAL FUNCTION PANEL: CPT

## 2022-12-09 PROCEDURE — 97530 THERAPEUTIC ACTIVITIES: CPT

## 2022-12-09 PROCEDURE — 36415 COLL VENOUS BLD VENIPUNCTURE: CPT

## 2022-12-09 PROCEDURE — 84145 PROCALCITONIN (PCT): CPT

## 2022-12-09 PROCEDURE — 85025 COMPLETE CBC W/AUTO DIFF WBC: CPT

## 2022-12-09 RX ORDER — OSELTAMIVIR PHOSPHATE 75 MG/1
75 CAPSULE ORAL 2 TIMES DAILY
Status: DISCONTINUED | OUTPATIENT
Start: 2022-12-09 | End: 2022-12-10 | Stop reason: HOSPADM

## 2022-12-09 RX ADMIN — SODIUM CHLORIDE, PRESERVATIVE FREE 10 ML: 5 INJECTION INTRAVENOUS at 20:17

## 2022-12-09 RX ADMIN — MAGNESIUM OXIDE 400 MG (241.3 MG MAGNESIUM) TABLET 400 MG: TABLET at 08:16

## 2022-12-09 RX ADMIN — RIVAROXABAN 15 MG: 15 TABLET, FILM COATED ORAL at 16:30

## 2022-12-09 RX ADMIN — OSELTAMIVIR PHOSPHATE 75 MG: 75 CAPSULE ORAL at 20:02

## 2022-12-09 RX ADMIN — Medication 100 MG: at 08:16

## 2022-12-09 RX ADMIN — ATORVASTATIN CALCIUM 80 MG: 80 TABLET, FILM COATED ORAL at 20:02

## 2022-12-09 RX ADMIN — VENLAFAXINE HYDROCHLORIDE 150 MG: 75 CAPSULE, EXTENDED RELEASE ORAL at 08:16

## 2022-12-09 RX ADMIN — RIVAROXABAN 15 MG: 15 TABLET, FILM COATED ORAL at 08:16

## 2022-12-09 RX ADMIN — Medication 3 MG: at 20:02

## 2022-12-09 RX ADMIN — SODIUM CHLORIDE, PRESERVATIVE FREE 10 ML: 5 INJECTION INTRAVENOUS at 08:17

## 2022-12-09 RX ADMIN — NEPHROCAP 1 MG: 1 CAP ORAL at 08:16

## 2022-12-09 RX ADMIN — MAGNESIUM OXIDE 400 MG (241.3 MG MAGNESIUM) TABLET 400 MG: TABLET at 20:02

## 2022-12-09 RX ADMIN — ALPRAZOLAM 0.5 MG: 0.5 TABLET ORAL at 23:36

## 2022-12-09 RX ADMIN — LISINOPRIL 10 MG: 10 TABLET ORAL at 11:46

## 2022-12-09 RX ADMIN — METHOCARBAMOL 750 MG: 750 TABLET ORAL at 20:03

## 2022-12-09 ASSESSMENT — PAIN DESCRIPTION - ORIENTATION: ORIENTATION: RIGHT

## 2022-12-09 ASSESSMENT — PAIN SCALES - GENERAL
PAINLEVEL_OUTOF10: 0
PAINLEVEL_OUTOF10: 0
PAINLEVEL_OUTOF10: 7
PAINLEVEL_OUTOF10: 4

## 2022-12-09 ASSESSMENT — PAIN DESCRIPTION - PAIN TYPE: TYPE: ACUTE PAIN

## 2022-12-09 ASSESSMENT — PAIN DESCRIPTION - LOCATION: LOCATION: LEG

## 2022-12-09 ASSESSMENT — PAIN DESCRIPTION - DESCRIPTORS: DESCRIPTORS: ACHING

## 2022-12-09 NOTE — CONSULTS
Infectious Diseases Inpatient Consult Note      Reason for Consult:  Fevers in Hospital - Influenza A infection    Requesting Physician:  Dawood Garcia      Primary Care Physician:  Denis Vogel MD    History Obtained From:  Epic and Pt      CHIEF COMPLAINT:     Chief Complaint   Patient presents with    Loss of Consciousness     Pt states he has had episodes of falling asleep in the shower and in parking lots. Pt states wife is upset over these episodes. Pt states medications have recently been increased. HISTORY OF PRESENT ILLNESS:  59 y.o. man admitted to hospital secondary to loss of consciousness he has a history of anxiety, depression, hypertension, patient also found to be sleeping excessively falling asleep in the parking lot he has been taking Xanax for anxiety apparently has been taking more than prescribed/in the hospital found to have acute PE assessment right lower extremity swelling with acute DVT in the right leg. There is also concern for AV fistula to the artery and femoral vein due to ongoing right groin swelling. He was noted  Vascular surgery and at this time to manage conservatively. He developed high fever in the hospital tested positive for influenza A we are consulted for recommendations. UDS after admission positive for cannabis and fentanyl. Blood cultures remain negative.          Past Medical History:    Past Medical History:   Diagnosis Date    Anxiety     Depression     Hyperlipidemia     Hypertension     borderline     Osteoarthritis     knees    Torn ACL 2003    left, right    Torn meniscus     left, right       Past Surgical History:    Past Surgical History:   Procedure Laterality Date    COLONOSCOPY N/A 4/13/2021    COLONOSCOPY DIAGNOSTIC performed by Dimple Pittman MD at [address] [place] [address]      tooth extraction    KNEE ARTHROSCOPY Left        Current Medications:    No outpatient medications have been marked as taking for the 12/5/22 encounter [facility] H[facility]PITAL Encounter). Allergies:  Patient has no known allergies. Immunizations :   Immunization History   Administered Date(s) Administered    COVID-19, PFIZER PURPLE top, DILUTE for use, (age 15 y+), 30mcg/0.3mL 2021, 2021    Influenza Virus Vaccine 10/28/2017, 2018    Influenza, FLUARIX, FLULAVAL, FLUZONE (age 10 mo+) AND AFLURIA, (age 1 y+), PF, 0.5mL 2016    Influenza, FLUCELVAX, (age 10 mo+), MDCK, PF, 0.5mL 2021    Tdap (Boostrix, Adacel) 2021         Social History:     Social History     Tobacco Use    Smoking status: Every Day     Packs/day: 1.00     Types: Cigarettes     Start date: 0     Last attempt to quit: 12/15/2021     Years since quittin.9    Smokeless tobacco: Never   Vaping Use    Vaping Use: Former   Substance Use Topics    Alcohol use: Yes     Alcohol/week: 6.0 standard drinks     Types: 3 Cans of beer, 3 Shots of liquor per week     Comment: social drink.     Drug use: No     Social History     Tobacco Use   Smoking Status Every Day    Packs/day: 1.00    Types: Cigarettes    Start date: 0    Last attempt to quit: 12/15/2021    Years since quittin.9   Smokeless Tobacco Never      Family History   Problem Relation Age of Onset    Other Mother         Pulmonary embolism     Other Father         does not know     Cancer Maternal Grandfather         abdominal cancer of some kind           REVIEW OF SYSTEMS:     Constitutional:  fevers++ , chills, night sweats  Eyes:  negative for blurred vision, eye discharge, visual disturbance   HEENT:  negative for hearing loss, ear drainage,nasal congestion  Respiratory:  negative for cough, shortness of breath or hemoptysis   Cardiovascular:  negative for chest pain, palpitations, syncope  Gastrointestinal:  negative for nausea, vomiting, diarrhea, constipation, abdominal pain  Genitourinary:  negative for frequency, dysuria, urinary incontinence, hematuria  Hematologic/Lymphatic:  negative for easy bruising, bleeding and lymphadenopathy  Allergic/Immunologic:  negative for recurrent infections, angioedema, anaphylaxis   Endocrine:  negative for weight changes, polyuria, polydipsia and polyphagia  Musculoskeletal:  Rt leg pain + swelling ++, swelling, decreased range of motion  Integumentary: No rashes, skin lesions  Neurological:  negative for headaches, slurred speech, unilateral weakness  Psychiatric: negative for hallucinations,confusion,agitation.      PHYSICAL EXAM:      Vitals:  T max  102.2   BP (!) 154/78   Pulse 78   Temp 100 °F (37.8 °C) (Oral)   Resp 18   Wt 177 lb 7.5 oz (80.5 kg)   SpO2 99%   BMI 27.80 kg/m²     General Appearance: alert,in some+  acute distress, ++ pallor, no icterus   Skin: warm and dry, no rash or erythema  Head: normocephalic and atraumatic  Eyes: pupils equal, round, and reactive to light, conjunctivae normal  ENT: tympanic membrane, external ear and ear canal normal bilaterally, nose without deformity, nasal mucosa and turbinates normal without polyps  Neck: supple and non-tender without mass, no thyromegaly  no cervical lymphadenopathy  Pulmonary/Chest: clear to auscultation bilaterally- no wheezes, rales or rhonchi, normal air movement, no respiratory distress  Cardiovascular: normal rate, regular rhythm, normal S1 and S2, no murmurs, rubs, clicks, or gallops, no carotid bruits  Abdomen: soft, non-tender, non-distended, normal bowel sounds, no masses or organomegaly  Extremities: no cyanosis, clubbing or edema  Musculoskeletal: normal range of motion, no joint swelling, deformity or tenderness  Integumentary: No rashes, no abnormal skin lesions, no petechiae  Neurologic: reflexes normal and symmetric, no cranial nerve deficit  Psych:  Orientation, sensorium, mood normal   Lines: IV  Rt leg pan swelling and Rt groin hematoma ++     DATA:    CBC:   Lab Results   Component Value Date    WBC 4.9 12/09/2022    HGB 7.9 (L) 12/09/2022    HCT 23.9 (L) 12/09/2022    MCV 90.8 12/09/2022     12/09/2022     RENAL:   Lab Results   Component Value Date    CREATININE 0.6 (L) 12/09/2022    BUN 12 12/09/2022     (L) 12/09/2022    K 3.7 12/09/2022     12/09/2022    CO2 22 12/09/2022     SED RATE:   Lab Results   Component Value Date/Time    SEDRATE 39 12/05/2022 04:15 AM     CK: No results found for: CKTOTAL  CRP:   Lab Results   Component Value Date/Time    CRP 39.3 12/05/2022 04:15 AM     Hepatic Function Panel:   Lab Results   Component Value Date/Time    ALKPHOS 111 12/05/2022 04:15 AM     12/05/2022 04:15 AM     12/05/2022 04:15 AM    PROT 6.5 12/05/2022 04:15 AM    BILITOT 0.3 12/05/2022 04:15 AM    BILIDIR <0.2 12/05/2022 04:15 AM    IBILI see below 12/05/2022 04:15 AM    LABALBU 2.8 12/09/2022 05:37 AM     UA:  Lab Results   Component Value Date/Time    COLORU Yellow 12/06/2022 11:00 PM    CLARITYU Clear 12/06/2022 11:00 PM    GLUCOSEU Negative 12/06/2022 11:00 PM    BILIRUBINUR Negative 12/06/2022 11:00 PM    BILIRUBINUR NEGATIVE 04/05/2022 03:52 PM    KETUA Negative 12/06/2022 11:00 PM    SPECGRAV 1.026 12/06/2022 11:00 PM    BLOODU Negative 12/06/2022 11:00 PM    PHUR 6.0 12/06/2022 11:00 PM    PHUR 6.5 12/06/2022 11:00 PM    PROTEINU Negative 12/06/2022 11:00 PM    UROBILINOGEN 1.0 12/06/2022 11:00 PM    NITRU Negative 12/06/2022 11:00 PM    LEUKOCYTESUR Negative 12/06/2022 11:00 PM    LABMICR Not Indicated 12/06/2022 11:00 PM    URINETYPE NotGiven 12/06/2022 11:00 PM      Urine Microscopic: No results found for: LABCAST, BACTERIA, COMU, HYALCAST, WBCUA, RBCUA, EPIU  Urine Reflex to Culture: No results found for: URRFLXCULT  Component Ref Range & Units 12/06/22 2300   Amphetamine Screen, Urine Negative <1000ng/mL Neg    Barbiturate Screen, Ur Negative <200 ng/mL Neg    Benzodiazepine Screen, Urine Negative <200 ng/mL Neg    Cannabinoid Scrn, Ur Negative <50 ng/mL POSITIVE Abnormal     Cocaine Metabolite Screen, Urine Negative <300 ng/mL Neg    Opiate Scrn, Ur Negative <300 ng/mL Neg    Comment: \"Therapeutic levels of pain medication, especially oxycontin and synthetic   opioids, may not be detected by this Methodology. Pain management screen   panel  Drug panel-PM-Hi Res Ur, Interp (PAIN) should be considered for drug   monitoring \". PCP Screen, Urine Negative <25 ng/mL Neg    Methadone Screen, Urine Negative <300 ng/mL Neg    Oxycodone Urine Negative <100 ng/ml Neg    FENTANYL SCREEN, URINE Negative <5 ng/mL POSITIVE Abnormal     pH, UA  6.5    Comment: Urine pH less than 5.0 or greater than 8.0 may indicate sample adulteration. Another sample should be collected if clinically           MICRO: cultures reviewed and updated by me            Respiratory Panel Film Array Report [[id number]] Collected: 12/09/22 1140   Order Status: Completed Updated: 12/09/22 1246    Report SEE IMAGE   Respiratory Panel, Molecular, with COVID-19 (Restricted: peds pts or suitable admitted adults) [[id number]] (Abnormal) Collected: 12/09/22 1140   Order Status: Completed Specimen: Nasal from Nasopharyngeal Updated: 12/09/22 1244    Organism Influenza A H1-2009 by PCR Abnormal     Respiratory Panel PCR --    POSITIVE FOR   See additional report for complete Respiratory Pathogen Panel    Narrative:     ORDER#: [id number]                          ORDERED BY: Jacqueline Soni   SOURCE: Nasopharyngeal                     COLLECTED:  12/09/22 11:40   ANTIBIOTICS AT CHARITO.:                      RECEIVED :  12/09/22 11:43   Culture, Blood 2 [[id number]] Collected: 12/07/22 1905   Order Status: Completed Specimen: Blood Updated: 12/08/22 2015    Culture, Blood 2 No Growth to date. Any change in status will be called.    Narrative:     ORDER#: [id number]                          ORDERED BY: Tello Gamble   SOURCE: Blood                              COLLECTED:  12/07/22 19:05   ANTIBIOTICS AT CHARITO.:                      RECEIVED :  12/07/22 19:13   If child <=2 yrs old please draw pediatric bottle. ~Blood Culture #2   Culture, Blood 1 [[id number]] Collected: 12/07/22 1858   Order Status: Completed Specimen: Blood Updated: 12/08/22 2015    Blood Culture, Routine No Growth to date. Any change in status will be called. Narrative:     ORDER#: [id number]                          ORDERED BY: Grace Simms   SOURCE: Blood                              COLLECTED:  12/07/22 18:58   ANTIBIOTICS AT CHARITO.:                      RECEIVED :  12/07/22 19:13   If child <=2 yrs old please draw pediatric bottle. ~Blood Culture 1   Culture, Urine [[id number]] Collected: 12/06/22 2300   Order Status: Completed Specimen: Urine, clean catch Updated: 12/08/22 0807    Urine Culture, Routine No growth at 18 to 36 hours   Narrative:     ORDER#: [id number]                          ORDERED BY: Grace Simms   SOURCE: Urine Clean Catch                  COLLECTED:  12/06/22 23:00   ANTIBIOTICS AT CHARITO.:                      RECEIVED :  12/07/22 05:04   Culture, Urine [[id number]]    Order Status: No result Specimen: Urine, clean catch      Blood Culture:   Lab Results   Component Value Date/Time    [facility]  12/07/2022 06:58 PM     No Growth to date. Any change in status will be called. Chandni Clarke  12/07/2022 07:05 PM     No Growth to date. Any change in status will be called.        Viral Culture:    No results found for: COVID19  Urine Culture:   Recent Labs     12/06/22 2300   LABURIN No growth at 18 to 36 hours       Scheduled Meds:   magnesium oxide  400 mg Oral BID    b complex-C-folic acid  1 mg Oral Daily    rivaroxaban  15 mg Oral BID WC    venlafaxine  150 mg Oral Daily    lisinopril  10 mg Oral Daily    melatonin  3 mg Oral Nightly    vitamin B-1  100 mg Oral Daily    sodium chloride flush  5-40 mL IntraVENous 2 times per day    atorvastatin  80 mg Oral Nightly    nicotine  1 patch TransDERmal Daily       Continuous Infusions:   sodium chloride         PRN Meds:  labetalol, methocarbamol, loperamide, dicyclomine, hydrOXYzine pamoate, promethazine, ALPRAZolam, sodium chloride flush, sodium chloride, acetaminophen **OR** acetaminophen, perflutren lipid microspheres, ondansetron, hydrALAZINE    Imaging:   XR CHEST 1 VIEW   Final Result   No acute abnormality identified. MRI BRAIN W CONTRAST   Final Result   No abnormal enhancement. CTA LOWER EXTREMITY RIGHT W CONTRAST   Final Result   1. Large right thigh hematoma but no evidence of active bleeding or   pseudoaneurysm. No evidence of AV fistula. 2. Unremarkable arterial circulation of the right lower extremity. No   evidence of arterial aneurysm, dissection or occlusion. MRI BRAIN WO CONTRAST   Final Result   No acute infarct. Nonspecific focus of rounded signal abnormality within the subcortical matter   white matter of the left frontal lobe. Suggest a repeat study with IV contrast for further evaluation. NM Cardiac Stress Test Nuclear Imaging   Final Result      VL Extremity Venous Right   Final Result      CT HEAD WO CONTRAST   Final Result   No acute intracranial abnormality. CT CHEST PULMONARY EMBOLISM W CONTRAST   Final Result   1. Acute low volume pulmonary embolism involving the subsegmental pulmonary   arteries in bilateral lungs. The central pulmonary arteries are patent. There is no right ventricular strain. 2. Mild cardiomegaly with mild diffuse congestion in the lungs. 3. Mild mosaic lung attenuation may reflect air trapping or regional   perfusion differences. Communications: The above critical/ time-sensitive findings were communicated   personally by radiology communications center/Dr. Claire Goodman to Dr. Arturo Alston and   nurse [facility] at 10:10 a.m. 12/05/2022. CT CHEST ABDOMEN PELVIS W CONTRAST Additional Contrast? None   Final Result   Chest      There is a questionable filling defect seen in right upper lobe pulmonary   artery.  However this finding could be artifactual from streak artifact from   contrast Xanax  UDS+ FOR Cannabis and Fentanyl  Fevers in hospital   Influenza a INFECTION  MRI brain -ve  CT chest with PE      Labs, Microbiology, Radiology and pertinent results from current hospitalization and care every where were reviewed by me as a part of the consultation. PLAN :  Start Tamiflu x  75 mg q 12 hR  X 5 DAys  Check HIV and Hepatitis panel  He has on going Lymphopenia  He has UNprovoked DVT and PE is concerning   Gina Chavira for d/c when ok with primary     Discussed with patient/Family and Nursing     Thanks for allowing me to participate in your patient's care please call me with any questions or concerns.     Dr. Amador Mar MD  [address] [place] [address] Physician  Phone: [phone]   Fax : [phone]

## 2022-12-09 NOTE — PROGRESS NOTES
Physical Therapy  Facility/Department: [facility] MED SURG  Physical Therapy Treatment session  Name: Ashley Doan  : 1965  MRN: [id number]  Date of Service: 2022  Ashley Doan scored a 20/24 on the AM-PAC short mobility form. At this time, no further PT is recommended upon discharge due to ambulating well with crutches. Will continue to monitor. Recommend patient returns to prior setting with prior services. Discharge Recommendations:  Home with assist PRN   PT Equipment Recommendations  Equipment Needed: Yes  Crutches: Axillary      Patient Diagnosis(es): The primary encounter diagnosis was Syncope and collapse. A diagnosis of Elevated troponin was also pertinent to this visit. Past Medical History:  has a past medical history of Anxiety, Depression, Hyperlipidemia, Hypertension, Osteoarthritis, Torn ACL, and Torn meniscus. Past Surgical History:  has a past surgical history that includes Knee arthroscopy (Left); Dental surgery; and Colonoscopy (N/A, 2021). Assessment   Assessment: The pt is a 59 y.o. male who presented to the ED on 22 secondary to recently falling asleep in his car for several hours falling asleep in the shower for a few hours. The patient was found to have PE's on CT Chest and is complaining of RLE Swelling and Pain at rest and thus a Venous Duplex was ordered. 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 Vascular has since r/o the fistula. Prior to admission, pt living with family in home setting, independent with ADLs and ambulation without device, working full time. Pt currently limited by RLE pain as well as SOB and lightheadedness with Hgb of 7.9. Pt. was able to ambualte 160' with crutches with Supervision. Defered steps d/t low hgb. Anticipate return home with PRN assist. Will need axillary crutches.   Treatment Diagnosis: impaired mobility  Therapy Prognosis: Good  Activity Tolerance  Activity Tolerance: Patient tolerated treatment well;Patient limited by pain  Activity Tolerance Comments: mildly limited by SOB and lightheadedness with Hgb of 7.9     Plan   Physcial Therapy Plan  General Plan: 2-3 times per week  Current Treatment Recommendations: Strengthening, Balance training, Functional mobility training, Transfer training, Gait training, Endurance training, Stair training, Neuromuscular re-education, Patient/Caregiver education & training, Equipment evaluation, education, & procurement, Safety education & training, Therapeutic activities  Safety Devices  Type of Devices: Call light within reach, Left in chair, Nurse notified (Leaving alarm off so Pt. can stand to walker periodically)  Restraints  Restraints Initially in Place: No     Restrictions  Restrictions/Precautions  Restrictions/Precautions: Contact Precautions (Following treatment 12/9 Pt. plced on COVID r/o)  Required Braces or Orthoses?: No  Position Activity Restriction  Other position/activity restrictions: ACE wrap to R thigh, COVID r/o     Subjective   General  Chart Reviewed: Yes  Additional Pertinent Hx: The pt is a 59 y.o. male who presented to the ED on 12/5/22 secondary to recently falling asleep in his car for several hours falling asleep in the shower for a few hours. The patient was found to have PE's on CT Chest and is complaining of RLE Swelling and Pain at rest and thus a Venous Duplex was ordered. The Venous Duplex was notable for an Acute DVT of the Right Mid-Distal Femoral Vein and an AV-Fistula between the Femoral Artery and Femoral Vein. Medical Bxejfu08/9:  Now determined no AV-fistula. Spiking fevers. Respiratory w/u in place. Response To Previous Treatment: Patient with no complaints from previous session.   Family / Caregiver Present: No (But spoke with wife on the phone)  Follows Commands: Within Functional Limits  Subjective  Subjective: Pt is agreeable to PT, 8/10 pain in the R thigh/leg         Social/Functional History  Social/Functional History  Lives With: Spouse, Son (2 teenage sons)  Type of Home: House  Home Layout: Two level (bi-level)  Home Access: Stairs to enter with rails  Entrance Stairs - Number of Steps: 7  Bathroom Shower/Tub: Tub/Shower unit  Bathroom Toilet: Standard  Home Equipment: iMove  ADL Assistance: Independent  Homemaking Assistance: Independent  Ambulation Assistance: Independent  Transfer Assistance: Independent  Active : Yes  Mode of Transportation: Car  Occupation: Full time employment  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  ecchymosis present mid and posterior thigh. PT spoke with RN regarding ACE wrap bunched up. Replaced with wide ACE wrap, wrapped from toes to thigh. Bed mobility  Supine to Sit: Modified independent  Sit to Supine: Unable to assess (Left up in chair)  Scooting: Modified independent  Transfers  Sit to Stand: Supervision  Stand to Sit: Supervision  Comment: Demonstrated improved technique of crutches for standing  Ambulation  Surface: Level tile  Device: Axillary Crutches  Assistance: Supervision  Gait Deviations: Slow Alanis;Decreased step length;Decreased step height  Distance: 160' x 1 and short distances in room x 1  Comments: Stood to attempt to urinate but unable to;  SpO2 97% on RA following ambulation  Stairs/Curb  Stairs?: No (Defered d/t Hgb of 7.9 and Pt. SOB and lightheaded mildly)     Balance  Posture: Good  Sitting - Static: Good  Sitting - Dynamic: Good  Standing - Static: Good  Standing - Dynamic: Good;-  Comments:  Will be using RW with nsg staff  Exercise Treatment: AP x 10 B  instructed and heel slides on the L LE x 10                                                          AM-PAC Score  AM-PAC Inpatient Mobility Raw Score : 20 (12/09/22 1115)  AM-PAC Inpatient T-Scale Score : 47.67 (12/09/22 1115)  Mobility Inpatient CMS 0-100% Score: 35.83 (12/09/22 1115)  Mobility Inpatient CMS G-Code Modifier : CJ (12/09/22 1115)                 Goals  Short Term Goals  Time Frame for Short Term Goals: by acute discharge ( ongoing 12/9)  Short Term Goal 1: ambulate > 50' independently, no device  Short Term Goal 2: ascend/descend 7 steps with rail and SBA  Patient Goals   Patient Goals : none stated       Education  Patient Education  Education Given To: Patient  Education Provided: Role of Therapy;Plan of Care;Transfer Training  Education Method: Verbal  Barriers to Learning: None  Education Outcome: Verbalized understanding      Therapy Time   Individual Concurrent Group Co-treatment   Time In 1015         Time Out 1100         Minutes 45         Timed Code Treatment Minutes: [facility] [address], [place], [id number]

## 2022-12-09 NOTE — PROGRESS NOTES
Have received many calls from Pt's wife overnight regarding coagulopathy testing results. After looking in chart , this RN unable to find any results . Pt continues to have right leg wrapped . Leg is swollen twice the size of the left and is taut . Pulse is weak but palpable. Has been medicated with PRN'S as needed . Running low grade temp.  99.9 this am.

## 2022-12-09 NOTE — PROGRESS NOTES
Hospitalist Progress Note      PCP: Shirlean Duverney, MD    Date of Admission: 12/5/2022      Subjective: Having some cough, had minimal nosebleed, not anymore, no chest pain or shortness of breath at this time      Medications:  Reviewed    Infusion Medications    sodium chloride       Scheduled Medications    magnesium oxide  400 mg Oral BID    b complex-C-folic acid  1 mg Oral Daily    rivaroxaban  15 mg Oral BID WC    venlafaxine  150 mg Oral Daily    lisinopril  10 mg Oral Daily    melatonin  3 mg Oral Nightly    vitamin B-1  100 mg Oral Daily    sodium chloride flush  5-40 mL IntraVENous 2 times per day    atorvastatin  80 mg Oral Nightly    nicotine  1 patch TransDERmal Daily     PRN Meds: labetalol, methocarbamol, loperamide, dicyclomine, hydrOXYzine pamoate, promethazine, ALPRAZolam, sodium chloride flush, sodium chloride, acetaminophen **OR** acetaminophen, perflutren lipid microspheres, ondansetron, hydrALAZINE      Intake/Output Summary (Last 24 hours) at 12/9/2022 1018  Last data filed at 12/9/2022 0914  Gross per 24 hour   Intake 240 ml   Output --   Net 240 ml       Physical Exam Performed:    /79   Pulse 86   Temp 100.3 °F (37.9 °C) (Oral)   Resp 18   Wt 177 lb 7.5 oz (80.5 kg)   SpO2 97%   BMI 27.80 kg/m²     General appearance: No apparent distress  Neck: Supple  Respiratory:  Normal respiratory effort. Clear to auscultation, bilaterally without Rales/Wheezes/Rhonchi. Cardiovascular: Regular rate and rhythm with normal S1/S2 without murmurs, rubs or gallops. Abdomen: Soft, non-tender, non-distended   Musculoskeletal: No clubbing, cyanosis   Skin: Skin color, texture, turgor normal.  No rashes or lesions.   Neurologic: No focal weakness  Psychiatric: Alert and oriented  Capillary Refill: Brisk, 3 seconds, normal   Peripheral Pulses: +2 palpable, equal bilaterally       Labs:   Recent Labs     12/07/22  0437 12/08/22  0547 12/09/22  0537   WBC 8.6 6.3 4.9   HGB 9.5* 8.8* 7.9*   HCT 29.0* 26.7* 23.9*    168 165     Recent Labs     12/07/22  0437 12/08/22  0547 12/09/22  0537    133* 134*   K 3.8 3.7 3.7    98* 100   CO2 23 24 22   BUN 12 13 12   CREATININE 0.8* 0.8* 0.6*   CALCIUM 8.5 8.1* 8.0*   PHOS 3.5 3.4 3.4     No results for input(s): AST, ALT, BILIDIR, BILITOT, ALKPHOS in the last 72 hours. No results for input(s): INR in the last 72 hours. No results for input(s): Donzella Rands in the last 72 hours. Urinalysis:      Lab Results   Component Value Date/Time    NITRU Negative 12/06/2022 11:00 PM    BLOODU Negative 12/06/2022 11:00 PM    SPECGRAV 1.026 12/06/2022 11:00 PM    GLUCOSEU Negative 12/06/2022 11:00 PM       Radiology:  XR CHEST 1 VIEW   Final Result   No acute abnormality identified. MRI BRAIN W CONTRAST   Final Result   No abnormal enhancement. CTA LOWER EXTREMITY RIGHT W CONTRAST   Final Result   1. Large right thigh hematoma but no evidence of active bleeding or   pseudoaneurysm. No evidence of AV fistula. 2. Unremarkable arterial circulation of the right lower extremity. No   evidence of arterial aneurysm, dissection or occlusion. MRI BRAIN WO CONTRAST   Final Result   No acute infarct. Nonspecific focus of rounded signal abnormality within the subcortical matter   white matter of the left frontal lobe. Suggest a repeat study with IV contrast for further evaluation. NM Cardiac Stress Test Nuclear Imaging   Final Result      VL Extremity Venous Right   Final Result      CT HEAD WO CONTRAST   Final Result   No acute intracranial abnormality. CT CHEST PULMONARY EMBOLISM W CONTRAST   Final Result   1. Acute low volume pulmonary embolism involving the subsegmental pulmonary   arteries in bilateral lungs. The central pulmonary arteries are patent. There is no right ventricular strain. 2. Mild cardiomegaly with mild diffuse congestion in the lungs.    3. Mild mosaic lung attenuation may reflect air trapping or regional   perfusion differences. Communications: The above critical/ time-sensitive findings were communicated   personally by radiology communications center/Dr. Hue Gant to Dr. Rylie Uriostegui and   nurse Vicenta Paul at 10:10 a.m. 12/05/2022. CT CHEST ABDOMEN PELVIS W CONTRAST Additional Contrast? None   Final Result   Chest      There is a questionable filling defect seen in right upper lobe pulmonary   artery. However this finding could be artifactual from streak artifact from   contrast bolus in the SVC and the timing the study rather than a true tiny   pulmonary embolus. .  Correlate for any clinical risk factors of pulmonary   embolism. Consider CT PA for further evaluation of this finding to see if it   persists. A few scattered ground-glass opacities are seen throughout the lungs. Ground-glass opacities are nonspecific can be due to atelectasis,   postinflammatory-infectious change or early edema. 5 mm noncalcified pulmonary nodule right lower lobe      Abdomen pelvis:      No acute intra-abdominal abnormality      The findings were sent to the Radiology Results [id number] at 6:03   am on 12/5/2022 to be communicated to a licensed caregiver. RECOMMENDATIONS:   5 mm right solid pulmonary nodule. No routine follow-up imaging is   recommended per Fleischner Society Guidelines. These guidelines do not apply to immunocompromised patients and patients with   cancer. Follow up in patients with significant comorbidities as clinically   warranted. For lung cancer screening, adhere to Lung-RADS guidelines. Reference: Radiology. 2017; 284(1):228-43. CT Head W/O Contrast   Final Result   1. No acute intracranial hemorrhage or wedge-shaped area of ischemia. 2. Intracranial atherosclerosis with mild patchy white matter low attenuation   suggestive of chronic microvascular ischemic change. 3. Chronic paranasal sinus disease.          XR CHEST (2 VW)   Final Result   Clear chest without acute cardiopulmonary process. IP CONSULT TO CARDIOLOGY  IP CONSULT TO NEUROLOGY  IP CONSULT TO VASCULAR SURGERY    Assessment/Plan:    Active Hospital Problems    Diagnosis     Arteriovenous fistula (HCC) [I77.0]      Priority: Medium    Syncope [R55]      Priority: Medium    HTN (hypertension) [I10]      Priority: Medium    Elevated troponin [R77.8]      Priority: Medium    Syncope and collapse [R55]      Priority: Medium     Pulmonary embolism with right leg DVT, off heparin drip now on Xarelto  Large right thigh hematoma, vascular surgery consulted  Elevated troponin  Syncope  Essential hypertension  Reported drug overdose on home Xanax and opioid reported per family that he was taking more than he supposed to  Anemia, appears chronic, follow-up as outpatient  Fever, spiked fever last night again, this morning fever with low-grade 100.3, will check procalcitonin patient not on antibiotics, could still be due to hematoma but suspecting viral infection I will check viral panel and follow on ID recommendations  Nonspecific focus rounded signal abnormality on the MRI brain, neurology consulted no acute infarct  Elevated troponin, nuclear medicine stress test negative    Diet: ADULT DIET;  Regular  Code Status: Full Code      Onetha Phoenix, MD

## 2022-12-09 NOTE — PROGRESS NOTES
Neurology    White matter lesion was noted on brain MRI. No contrast enhancement on follow up imaging. No further inpatient neurologic workup. Please call back w/ any additional questions or concerns. Thank you.      Tong Jacobson NP  [address] [place] [address] [id number] Neurology

## 2022-12-09 NOTE — PLAN OF CARE
Problem: Discharge Planning  Goal: Discharge to home or other facility with appropriate resources  12/9/2022 0915 by Ted Bueno RN  Outcome: Progressing     Problem: ABCDS Injury Assessment  Goal: Absence of physical injury  12/9/2022 0915 by Ted Bueno RN  Outcome: Progressing     Problem: Safety - Adult  Goal: Free from fall injury  12/9/2022 0915 by Ted Bueno RN  Outcome: Progressing     Problem: Pain  Goal: Verbalizes/displays adequate comfort level or baseline comfort level  12/9/2022 0915 by Ted Bueno RN  Outcome: Progressing

## 2022-12-10 VITALS
DIASTOLIC BLOOD PRESSURE: 68 MMHG | BODY MASS INDEX: 27.82 KG/M2 | HEART RATE: 70 BPM | HEIGHT: 67 IN | RESPIRATION RATE: 16 BRPM | TEMPERATURE: 98.2 F | SYSTOLIC BLOOD PRESSURE: 125 MMHG | WEIGHT: 177.25 LBS | OXYGEN SATURATION: 99 %

## 2022-12-10 PROBLEM — R50.9 FEVER AND CHILLS: Status: ACTIVE | Noted: 2022-12-10

## 2022-12-10 PROBLEM — I82.409 DEEP VENOUS THROMBOSIS (HCC): Status: ACTIVE | Noted: 2022-12-10

## 2022-12-10 PROBLEM — T50.901A ACCIDENTAL DRUG OVERDOSE: Status: ACTIVE | Noted: 2022-12-10

## 2022-12-10 PROBLEM — J10.1 INFLUENZA A: Status: ACTIVE | Noted: 2022-12-10

## 2022-12-10 PROBLEM — I26.99 ACUTE PULMONARY EMBOLISM WITHOUT ACUTE COR PULMONALE (HCC): Status: ACTIVE | Noted: 2022-12-10

## 2022-12-10 PROBLEM — T14.8XXA HEMATOMA: Status: ACTIVE | Noted: 2022-12-10

## 2022-12-10 LAB
ALBUMIN SERPL-MCNC: 2.9 G/DL (ref 3.4–5)
ANION GAP SERPL CALCULATED.3IONS-SCNC: 11 MMOL/L (ref 3–16)
BASOPHILS ABSOLUTE: 0 K/UL (ref 0–0.2)
BASOPHILS RELATIVE PERCENT: 0.2 %
BUN BLDV-MCNC: 12 MG/DL (ref 7–20)
CALCIUM SERPL-MCNC: 8.1 MG/DL (ref 8.3–10.6)
CHLORIDE BLD-SCNC: 101 MMOL/L (ref 99–110)
CO2: 24 MMOL/L (ref 21–32)
CREAT SERPL-MCNC: 0.6 MG/DL (ref 0.9–1.3)
EOSINOPHILS ABSOLUTE: 0 K/UL (ref 0–0.6)
EOSINOPHILS RELATIVE PERCENT: 0.2 %
GFR SERPL CREATININE-BSD FRML MDRD: >60 ML/MIN/{1.73_M2}
GLUCOSE BLD-MCNC: 89 MG/DL (ref 70–99)
HAV IGM SER IA-ACNC: NORMAL
HCT VFR BLD CALC: 23.9 % (ref 40.5–52.5)
HEMOGLOBIN: 7.9 G/DL (ref 13.5–17.5)
HEPATITIS B CORE IGM ANTIBODY: NORMAL
HEPATITIS B SURFACE ANTIGEN INTERPRETATION: NORMAL
HEPATITIS C ANTIBODY INTERPRETATION: NORMAL
HIV AG/AB: NORMAL
HIV ANTIGEN: NORMAL
HIV-1 ANTIBODY: NORMAL
HIV-2 AB: NORMAL
LYMPHOCYTES ABSOLUTE: 0.7 K/UL (ref 1–5.1)
LYMPHOCYTES RELATIVE PERCENT: 16.4 %
MAGNESIUM: 2.1 MG/DL (ref 1.8–2.4)
MCH RBC QN AUTO: 30.4 PG (ref 26–34)
MCHC RBC AUTO-ENTMCNC: 33.2 G/DL (ref 31–36)
MCV RBC AUTO: 91.5 FL (ref 80–100)
MONOCYTES ABSOLUTE: 0.4 K/UL (ref 0–1.3)
MONOCYTES RELATIVE PERCENT: 9 %
NEUTROPHILS ABSOLUTE: 3.4 K/UL (ref 1.7–7.7)
NEUTROPHILS RELATIVE PERCENT: 74.2 %
PDW BLD-RTO: 16.3 % (ref 12.4–15.4)
PHOSPHORUS: 3.6 MG/DL (ref 2.5–4.9)
PLATELET # BLD: 179 K/UL (ref 135–450)
PMV BLD AUTO: 7.7 FL (ref 5–10.5)
POTASSIUM SERPL-SCNC: 4.2 MMOL/L (ref 3.5–5.1)
RBC # BLD: 2.61 M/UL (ref 4.2–5.9)
SODIUM BLD-SCNC: 136 MMOL/L (ref 136–145)
WBC # BLD: 4.5 K/UL (ref 4–11)

## 2022-12-10 PROCEDURE — 86702 HIV-2 ANTIBODY: CPT

## 2022-12-10 PROCEDURE — 85025 COMPLETE CBC W/AUTO DIFF WBC: CPT

## 2022-12-10 PROCEDURE — 6370000000 HC RX 637 (ALT 250 FOR IP): Performed by: INTERNAL MEDICINE

## 2022-12-10 PROCEDURE — 86701 HIV-1ANTIBODY: CPT

## 2022-12-10 PROCEDURE — 94760 N-INVAS EAR/PLS OXIMETRY 1: CPT

## 2022-12-10 PROCEDURE — 80074 ACUTE HEPATITIS PANEL: CPT

## 2022-12-10 PROCEDURE — 36415 COLL VENOUS BLD VENIPUNCTURE: CPT

## 2022-12-10 PROCEDURE — 97530 THERAPEUTIC ACTIVITIES: CPT

## 2022-12-10 PROCEDURE — 6370000000 HC RX 637 (ALT 250 FOR IP): Performed by: STUDENT IN AN ORGANIZED HEALTH CARE EDUCATION/TRAINING PROGRAM

## 2022-12-10 PROCEDURE — 80069 RENAL FUNCTION PANEL: CPT

## 2022-12-10 PROCEDURE — 87390 HIV-1 AG IA: CPT

## 2022-12-10 PROCEDURE — 97116 GAIT TRAINING THERAPY: CPT

## 2022-12-10 PROCEDURE — 83735 ASSAY OF MAGNESIUM: CPT

## 2022-12-10 RX ORDER — OSELTAMIVIR PHOSPHATE 75 MG/1
75 CAPSULE ORAL 2 TIMES DAILY
Qty: 8 CAPSULE | Refills: 0 | Status: SHIPPED | OUTPATIENT
Start: 2022-12-10 | End: 2022-12-14

## 2022-12-10 RX ORDER — LANOLIN ALCOHOL/MO/W.PET/CERES
400 CREAM (GRAM) TOPICAL DAILY
Qty: 30 TABLET | Refills: 0 | Status: SHIPPED | OUTPATIENT
Start: 2022-12-10

## 2022-12-10 RX ORDER — LISINOPRIL 10 MG/1
10 TABLET ORAL DAILY
Qty: 30 TABLET | Refills: 0 | Status: SHIPPED | OUTPATIENT
Start: 2022-12-10

## 2022-12-10 RX ADMIN — MAGNESIUM OXIDE 400 MG (241.3 MG MAGNESIUM) TABLET 400 MG: TABLET at 09:07

## 2022-12-10 RX ADMIN — OSELTAMIVIR PHOSPHATE 75 MG: 75 CAPSULE ORAL at 09:07

## 2022-12-10 RX ADMIN — ACETAMINOPHEN 650 MG: 325 TABLET, FILM COATED ORAL at 05:55

## 2022-12-10 RX ADMIN — VENLAFAXINE HYDROCHLORIDE 150 MG: 75 CAPSULE, EXTENDED RELEASE ORAL at 09:07

## 2022-12-10 RX ADMIN — NEPHROCAP 1 MG: 1 CAP ORAL at 09:07

## 2022-12-10 RX ADMIN — RIVAROXABAN 15 MG: 15 TABLET, FILM COATED ORAL at 09:10

## 2022-12-10 RX ADMIN — METHOCARBAMOL 750 MG: 750 TABLET ORAL at 05:55

## 2022-12-10 RX ADMIN — Medication 100 MG: at 09:07

## 2022-12-10 ASSESSMENT — PAIN SCALES - GENERAL
PAINLEVEL_OUTOF10: 6
PAINLEVEL_OUTOF10: 8

## 2022-12-10 ASSESSMENT — PAIN DESCRIPTION - LOCATION: LOCATION: LEG

## 2022-12-10 ASSESSMENT — PAIN DESCRIPTION - DESCRIPTORS: DESCRIPTORS: ACHING

## 2022-12-10 NOTE — PLAN OF CARE
Problem: Discharge Planning  Goal: Discharge to home or other facility with appropriate resources  12/10/2022 1042 by Maxime Schaefer RN  Outcome: Progressing  12/10/2022 0047 by Destin Mueller RN  Outcome: Progressing  Flowsheets (Taken 12/9/2022 2045)  Discharge to home or other facility with appropriate resources:   Identify barriers to discharge with patient and caregiver   Arrange for needed discharge resources and transportation as appropriate     Problem: ABCDS Injury Assessment  Goal: Absence of physical injury  12/10/2022 1042 by Maxime Schaefer RN  Outcome: Progressing  12/10/2022 0047 by Destin Mueller RN  Outcome: Progressing     Problem: Safety - Adult  Goal: Free from fall injury  12/10/2022 1042 by Maxime Schaefer RN  Outcome: Progressing  12/10/2022 0047 by Destin Mueller RN  Outcome: Progressing     Problem: Pain  Goal: Verbalizes/displays adequate comfort level or baseline comfort level  12/10/2022 1042 by Maxime Schaefer RN  Outcome: Progressing  12/10/2022 0047 by Destin Mueller RN  Outcome: Progressing     Problem: Respiratory - Adult  Goal: Achieves optimal ventilation and oxygenation  12/10/2022 1042 by Maxime Schaefer RN  Outcome: Progressing  12/10/2022 0047 by Destin Mueller RN  Outcome: Progressing  Flowsheets (Taken 12/9/2022 2045)  Achieves optimal ventilation and oxygenation:   Assess for changes in respiratory status   Assess for changes in mentation and behavior   Position to facilitate oxygenation and minimize respiratory effort   Oxygen supplementation based on oxygen saturation or arterial blood gases   Assess and instruct to report shortness of breath or any respiratory difficulty   Respiratory therapy support as indicated   Initiate smoking cessation protocol as indicated     Problem: Skin/Tissue Integrity - Adult  Goal: Skin integrity remains intact  12/10/2022 1042 by Maxime Schaefer RN  Outcome: Progressing  12/10/2022 0047 by Destin Mueller RN  Outcome: Progressing  Flowsheets (Taken 12/9/2022 2045)  Skin Integrity Remains Intact:   Monitor for areas of redness and/or skin breakdown   Assess vascular access sites hourly     Problem: Musculoskeletal - Adult  Goal: Return mobility to safest level of function  12/10/2022 1042 by Dora Petersen RN  Outcome: Progressing  12/10/2022 0047 by Chuy Bruno RN  Outcome: Progressing  Flowsheets (Taken 12/9/2022 2045)  Return Mobility to Safest Level of Function:   Assess patient stability and activity tolerance for standing, transferring and ambulating with or without assistive devices   Assist with transfers and ambulation using safe patient handling equipment as needed   Ensure adequate protection for wounds/incisions during mobilization   Obtain physical therapy/occupational therapy consults as needed   Instruct patient/family in ordered activity level  Goal: Return ADL status to a safe level of function  12/10/2022 1042 by Dora Petersen RN  Outcome: Progressing  12/10/2022 0047 by Chuy Bruno RN  Outcome: Progressing  Flowsheets (Taken 12/9/2022 2045)  Return ADL Status to a Safe Level of Function:   Administer medication as ordered   Assess activities of daily living deficits and provide assistive devices as needed   Obtain physical therapy/occupational therapy consults as needed   Assist and instruct patient to increase activity and self care as tolerated     Problem: Infection - Adult  Goal: Absence of infection at discharge  12/10/2022 1042 by Dora Petersen RN  Outcome: Progressing  12/10/2022 0047 by Chuy Bruno RN  Outcome: Progressing  Flowsheets (Taken 12/9/2022 2045)  Absence of infection at discharge:   Assess and monitor for signs and symptoms of infection   Monitor lab/diagnostic results   Monitor all insertion sites i.e., indwelling lines, tubes and drains   Administer medications as ordered   Instruct and encourage patient and family to use good hand hygiene technique Problem: Metabolic/Fluid and Electrolytes - Adult  Goal: Electrolytes maintained within normal limits  12/10/2022 1042 by Dora Petersen RN  Outcome: Progressing  12/10/2022 0047 by Chuy Bruno RN  Outcome: Progressing  Flowsheets (Taken 12/9/2022 2045)  Electrolytes maintained within normal limits:   Monitor labs and assess patient for signs and symptoms of electrolyte imbalances   Administer electrolyte replacement as ordered   Monitor response to electrolyte replacements, including repeat lab results as appropriate  Goal: Hemodynamic stability and optimal renal function maintained  12/10/2022 1042 by Dora Petersen RN  Outcome: Progressing  12/10/2022 0047 by Chuy Bruno RN  Outcome: Progressing  Flowsheets (Taken 12/9/2022 2045)  Hemodynamic stability and optimal renal function maintained:   Monitor labs and assess for signs and symptoms of volume excess or deficit   Monitor intake, output and patient weight   Monitor response to interventions for patient's volume status, including labs, urine output, blood pressure (other measures as available)

## 2022-12-10 NOTE — PROGRESS NOTES
Physical Therapy  Facility/Department: [facility] 4W MED SURG  Daily Treatment Note  NAME: Nazia Fink  : 1965  MRN: [id number]    Date of Service: 12/10/2022    Discharge Recommendations:  Home with assist PRN   PT Equipment Recommendations  Equipment Needed: Yes  Mobility Devices: Crutches  Crutches: Axillary  Other: issued to pt 12-10-22    Patient Diagnosis(es): The primary encounter diagnosis was Syncope and collapse. A diagnosis of Elevated troponin was also pertinent to this visit. Assessment   Activity Tolerance: Patient tolerated treatment well;Patient limited by pain  Equipment Needed: Yes  Mobility Devices: Crutches  Other: issued to pt     Plan    Physcial Therapy Plan  General Plan: 2-3 times per week  Current Treatment Recommendations: Strengthening;Balance training;Functional mobility training;Transfer training;Gait training; Endurance training;Stair training;Neuromuscular re-education;Patient/Caregiver education & training;Equipment evaluation, education, & procurement; Safety education & training; Therapeutic activities     Restrictions  Restrictions/Precautions  Restrictions/Precautions: Contact Precautions Droplet, Influenza  Required Braces or Orthoses?: No  Position Activity Restriction  Other position/activity restrictions: ACE wrap to R thigh, Influenza    Subjective    Subjective  Subjective: Pt ready to go home, requesting crutches for home use. Received order from MD and provided pt with crutches. Pain: Pt reports 10/10 pain in RLE with any mobility. With rest, this does decrease.   Orientation  Overall Orientation Status: Within Normal Limits  Cognition  Overall Cognitive Status: WNL     Objective   Vitals     Bed Mobility Training  Bed Mobility Training: Yes  Overall Level of Assistance: Independent  Rolling: Independent  Supine to Sit: Independent  Sit to Supine: Independent  Scooting: Independent  Balance  Sitting: Intact  Standing: Intact  Transfer Training  Transfer Training: Yes  Overall Level of Assistance: Modified independent  Sit to Stand: Modified independent  Stand to Sit: Modified independent  Gait Training  Gait Training: Yes  Right Side Weight Bearing: As tolerated  Left Side Weight Bearing: Full  Gait  Overall Level of Assistance: Modified independent  Interventions: Verbal cues;Demonstration  Base of Support: Shift to left  Step Length: Right shortened;Left shortened  Stance: Right decreased  Gait Abnormalities: Antalgic;Decreased step clearance; Step to gait  Distance (ft): 20 Feet  Assistive Device: Crutches; Other (comment) (ace wrap RLE)  Rail Use: None           Safety Devices  Type of Devices: Call light within reach; Left in chair;Nurse notified  Restraints  Restraints Initially in Place: No       Goals  Short Term Goals  Time Frame for Short Term Goals: by acute discharge ( ongoing 12/9)  Short Term Goal 1: ambulate > 50' independently, no device  Short Term Goal 2: ascend/descend 7 steps with rail and SBA  Patient Goals   Patient Goals : none stated    Education  Patient Education  Education Given To: Patient  Education Provided: Role of Therapy;Plan of Care;Transfer Training  Education Method: Verbal  Barriers to Learning: None  Education Outcome: Verbalized understanding;Demonstrated understanding    Therapy Time   Individual Concurrent Group Co-treatment   Time In 1130         Time Out 1200         Minutes [address], PT

## 2022-12-10 NOTE — PROGRESS NOTES
Hospital Medicine Discharge Summary    Patient ID: Reyna Diez      Patient's PCP: Allyn Rehman MD    Admit Date: 12/5/2022     Discharge Date:   12/10/2022    Admitting Provider: Marcio Weiss DO     Discharge Provider: Elías Sawant MD     Discharge Diagnoses: Active Hospital Problems    Diagnosis     Acute pulmonary embolism without acute cor pulmonale (HCC) [I26.99]      Priority: Medium    Accidental drug overdose [T50.901A]      Priority: Medium    Influenza A [J10.1]      Priority: Medium    Fever and chills [R50.9]      Priority: Medium    Hematoma [T14. 8XXA]      Priority: Medium    Deep venous thrombosis (HCC) [I82.409]      Priority: Medium    Arteriovenous fistula (HCC) [I77.0]      Priority: Medium    Syncope [R55]      Priority: Medium    HTN (hypertension) [I10]      Priority: Medium    Elevated troponin [R77.8]      Priority: Medium    Syncope and collapse [R55]      Priority: Medium       The patient was seen and examined on day of discharge and this discharge summary is in conjunction with any daily progress note from day of discharge. Hospital Course:     From HPI:\"The patient is a 59 y.o. male with past [place] as below who presents to [facility] with concern per himself and the family that over the last few days he has had 2 episodes where he has apparently passed out once when he was waiting at a [employer] and apparently it took him about 5 hours to wake up to answer his wife's phone call while he was parked and apparently a second time while at home. Both these episodes however were unwitnessed so uncertain as to the etiology patient does not remember what exactly happened.   He apparently notes that he recently traveled from [facility] back home after his family vacation and that since that time he also lost about 10 pounds and has been somewhat increasingly fatigued to the point where he does stumble at times when walking according to his son who is at bedside. Son is also mention that apparently has had some slight intermittent slurred speech but it seems very difficult to detect at times. According to the patient he denies any drug use although he does smoke. He denies any other recent other symptoms of fever, chills, dizziness, chest pain, shortness of breath, dysuria, blood in urine/stool/sputum, nausea/vomiting/diarrhea/abdominal pain. He apparently did just recently start venlafaxine this past 4 days ago for depression but he has never had symptoms from any of his medications recently such as this. He does take metoprolol  mg apparently for hypertension he notes that he has been taking this for a little while but he does not take any other medications for hypertension. He denies that he has ever had any previous cardiac issues in the past.  He denies any history of cancer in himself although there might be a history of cancer in one of his family members but he does not member his full family history. \"    Pulmonary embolism with right leg DVT, off heparin drip now on Xarelto, 18 more days of 50 mg twice daily then 10 mg daily discussed with patient discussed with nurse. Patient advised to seek immediate medical help in case of any worsening. Patient also advised to follow-up with hematology for further work-up in case considering this PE as unprovoked, discussed with him potential etiologies including malignancy, patient verbalized understanding and willingness to follow-up  Large right thigh hematoma, vascular surgery consulted, recommended continue wrapping and no further surgical intervention, to note that per vascular expecting AV fistula closure spontaneously once off anticoagulation.   Elevated troponin, cardiology consulted no further intervention, follow-up as outpatient  Syncope, likely due to above  Essential hypertension, better controlled, beta-blocker was stopped due to bradycardia on lisinopril controlled follow-up with cardiology as outpatient. Reported drug overdose on home Xanax and opioid reported per family that he was taking more than he supposed to  Anemia, appears chronic, follow-up as outpatient  Fever, multiple spikes, molecular viral panel checked and positive for influenza, started on Tamiflu ID consulted  Nonspecific focus rounded signal abnormality on the MRI brain, neurology consulted no acute infarct          Physical Exam Performed:     /69   Pulse 66   Temp 98 °F (36.7 °C) (Oral)   Resp 16   Ht 5' 7\" (1.702 m)   Wt 177 lb 4 oz (80.4 kg)   SpO2 97%   BMI 27.76 kg/m²       General appearance:  No apparent distress  HEENT:  Normal cephalic  Neck: Supple  Respiratory:  Normal respiratory effort. Clear to auscultation, bilaterally without Rales/Wheezes/Rhonchi. Cardiovascular:  Regular rate and rhythm with normal S1/S2 without murmurs, rubs or gallops. Abdomen: Soft, non-tender, non-distended  Musculoskeletal:  No clubbing, cyanosis right leg wrapped in  Skin: Right leg ecchymosis  Neurologic: No focal weakness. Psychiatric:  Alert and oriented  Capillary Refill: Brisk,< 3 seconds   Peripheral Pulses: +2 palpable, equal bilaterally       Labs: For convenience and continuity at follow-up the following most recent labs are provided:      CBC:    Lab Results   Component Value Date/Time    WBC 4.5 12/10/2022 04:45 AM    HGB 7.9 12/10/2022 04:45 AM    HCT 23.9 12/10/2022 04:45 AM     12/10/2022 04:45 AM       Renal:    Lab Results   Component Value Date/Time     12/10/2022 04:45 AM    K 4.2 12/10/2022 04:45 AM    K 4.0 12/05/2022 02:03 AM     12/10/2022 04:45 AM    CO2 24 12/10/2022 04:45 AM    BUN 12 12/10/2022 04:45 AM    CREATININE 0.6 12/10/2022 04:45 AM    CALCIUM 8.1 12/10/2022 04:45 AM    PHOS 3.6 12/10/2022 04:45 AM         Significant Diagnostic Studies    Radiology:   XR CHEST 1 VIEW   Final Result   No acute abnormality identified.          MRI BRAIN W CONTRAST   Final Result No abnormal enhancement. CTA LOWER EXTREMITY RIGHT W CONTRAST   Final Result   1. Large right thigh hematoma but no evidence of active bleeding or   pseudoaneurysm. No evidence of AV fistula. 2. Unremarkable arterial circulation of the right lower extremity. No   evidence of arterial aneurysm, dissection or occlusion. MRI BRAIN WO CONTRAST   Final Result   No acute infarct. Nonspecific focus of rounded signal abnormality within the subcortical matter   white matter of the left frontal lobe. Suggest a repeat study with IV contrast for further evaluation. NM Cardiac Stress Test Nuclear Imaging   Final Result      VL Extremity Venous Right   Final Result      CT HEAD WO CONTRAST   Final Result   No acute intracranial abnormality. CT CHEST PULMONARY EMBOLISM W CONTRAST   Final Result   1. Acute low volume pulmonary embolism involving the subsegmental pulmonary   arteries in bilateral lungs. The central pulmonary arteries are patent. There is no right ventricular strain. 2. Mild cardiomegaly with mild diffuse congestion in the lungs. 3. Mild mosaic lung attenuation may reflect air trapping or regional   perfusion differences. Communications: The above critical/ time-sensitive findings were communicated   personally by radiology communications center/Dr. Joe Rodriguez to Dr. Rubi Louis and   nurse Analia Frost at 10:10 a.m. 12/05/2022. CT CHEST ABDOMEN PELVIS W CONTRAST Additional Contrast? None   Final Result   Chest      There is a questionable filling defect seen in right upper lobe pulmonary   artery. However this finding could be artifactual from streak artifact from   contrast bolus in the SVC and the timing the study rather than a true tiny   pulmonary embolus. .  Correlate for any clinical risk factors of pulmonary   embolism. Consider CT PA for further evaluation of this finding to see if it   persists.       A few scattered ground-glass opacities are seen throughout the lungs. Ground-glass opacities are nonspecific can be due to atelectasis,   postinflammatory-infectious change or early edema. 5 mm noncalcified pulmonary nodule right lower lobe      Abdomen pelvis:      No acute intra-abdominal abnormality      The findings were sent to the Radiology Results [id number] at 6:03   am on 12/5/2022 to be communicated to a licensed caregiver. RECOMMENDATIONS:   5 mm right solid pulmonary nodule. No routine follow-up imaging is   recommended per Fleischner Society Guidelines. These guidelines do not apply to immunocompromised patients and patients with   cancer. Follow up in patients with significant comorbidities as clinically   warranted. For lung cancer screening, adhere to Lung-RADS guidelines. Reference: Radiology. 2017; 284(1):228-43. CT Head W/O Contrast   Final Result   1. No acute intracranial hemorrhage or wedge-shaped area of ischemia. 2. Intracranial atherosclerosis with mild patchy white matter low attenuation   suggestive of chronic microvascular ischemic change. 3. Chronic paranasal sinus disease. XR CHEST (2 VW)   Final Result   Clear chest without acute cardiopulmonary process.                 Consults:     IP CONSULT TO CARDIOLOGY  IP CONSULT TO NEUROLOGY  IP CONSULT TO VASCULAR SURGERY  IP CONSULT TO INFECTIOUS DISEASES    Disposition: Home    Condition at Discharge: Stable    Discharge Instructions/Follow-up: PCP, cardiology, hematology, vascular surgery    Code Status:  Full Code     Activity: activity as tolerated    Diet: cardiac diet      Discharge Medications:     Current Discharge Medication List             Details   !! rivaroxaban (XARELTO) 15 MG TABS tablet Take 1 tablet by mouth 2 times daily (with meals) for 36 doses  Qty: 36 tablet, Refills: 0      !! rivaroxaban (XARELTO) 20 MG TABS tablet Take 1 tablet by mouth daily (with breakfast)  Qty: 30 tablet, Refills: 0      lisinopril (PRINIVIL;ZESTRIL) 10 MG tablet Take 1 tablet by mouth daily  Qty: 30 tablet, Refills: 0      oseltamivir (TAMIFLU) 75 MG capsule Take 1 capsule by mouth 2 times daily for 8 doses  Qty: 8 capsule, Refills: 0      magnesium oxide (MAG-OX) 400 (240 Mg) MG tablet Take 1 tablet by mouth daily  Qty: 30 tablet, Refills: 0       !! - Potential duplicate medications found. Please discuss with provider. Details   ALPRAZolam (XANAX) 0.5 MG tablet Take 1 tablet by mouth nightly as needed for Sleep for up to 30 days. Qty: 30 tablet, Refills: 0    Associated Diagnoses: Anxiety      dicyclomine (BENTYL) 10 MG capsule TAKE ONE CAPSULE BY MOUTH THREE TIMES A DAY AS NEEDED FOR ABDOMINAL BLOATING  Qty: 90 capsule, Refills: 3      atorvastatin (LIPITOR) 20 MG tablet Take 1 tablet by mouth daily  Qty: 90 tablet, Refills: 1    Associated Diagnoses: Pure hypercholesterolemia      venlafaxine (EFFEXOR XR) 150 MG extended release capsule TAKE ONE CAPSULE BY MOUTH DAILY  Qty: 90 capsule, Refills: 1      Biotin 1 MG CAPS Take by mouth      B Complex Vitamins (VITAMIN B COMPLEX PO) Take 1 tablet by mouth daily      vitamin B-1 (THIAMINE) 100 MG tablet Take 100 mg by mouth daily      Glucosamine-Chondroitin (GLUCOSAMINE CHONDR COMPLEX PO) Take by mouth Take 2-3 by mouth every morning             Time Spent on discharge: 45 MINUTES in the examination, evaluation, counseling and review of medications and discharge plan. Signed:    Art Adrian MD   12/10/2022      Thank you Tinnie Sever, MD for the opportunity to be involved in this patient's care. If you have any questions or concerns, please feel free to contact me at [phone].

## 2022-12-10 NOTE — PLAN OF CARE
Problem: Discharge Planning  Goal: Discharge to home or other facility with appropriate resources  Outcome: Progressing  Flowsheets (Taken 12/9/2022 2045)  Discharge to home or other facility with appropriate resources:   Identify barriers to discharge with patient and caregiver   Arrange for needed discharge resources and transportation as appropriate     Problem: ABCDS Injury Assessment  Goal: Absence of physical injury  Outcome: Progressing     Problem: Safety - Adult  Goal: Free from fall injury  Outcome: Progressing     Problem: Pain  Goal: Verbalizes/displays adequate comfort level or baseline comfort level  Outcome: Progressing     Problem: Respiratory - Adult  Goal: Achieves optimal ventilation and oxygenation  Outcome: Progressing  Flowsheets (Taken 12/9/2022 2045)  Achieves optimal ventilation and oxygenation:   Assess for changes in respiratory status   Assess for changes in mentation and behavior   Position to facilitate oxygenation and minimize respiratory effort   Oxygen supplementation based on oxygen saturation or arterial blood gases   Assess and instruct to report shortness of breath or any respiratory difficulty   Respiratory therapy support as indicated   Initiate smoking cessation protocol as indicated     Problem: Skin/Tissue Integrity - Adult  Goal: Skin integrity remains intact  Outcome: Progressing  Flowsheets (Taken 12/9/2022 2045)  Skin Integrity Remains Intact:   Monitor for areas of redness and/or skin breakdown   Assess vascular access sites hourly     Problem: Musculoskeletal - Adult  Goal: Return mobility to safest level of function  Outcome: Progressing  Flowsheets (Taken 12/9/2022 2045)  Return Mobility to Safest Level of Function:   Assess patient stability and activity tolerance for standing, transferring and ambulating with or without assistive devices   Assist with transfers and ambulation using safe patient handling equipment as needed   Ensure adequate protection for wounds/incisions during mobilization   Obtain physical therapy/occupational therapy consults as needed   Instruct patient/family in ordered activity level  Goal: Return ADL status to a safe level of function  Outcome: Progressing  Flowsheets (Taken 12/9/2022 2045)  Return ADL Status to a Safe Level of Function:   Administer medication as ordered   Assess activities of daily living deficits and provide assistive devices as needed   Obtain physical therapy/occupational therapy consults as needed   Assist and instruct patient to increase activity and self care as tolerated     Problem: Infection - Adult  Goal: Absence of infection at discharge  Outcome: Progressing  Flowsheets (Taken 12/9/2022 2045)  Absence of infection at discharge:   Assess and monitor for signs and symptoms of infection   Monitor lab/diagnostic results   Monitor all insertion sites i.e., indwelling lines, tubes and drains   Administer medications as ordered   Instruct and encourage patient and family to use good hand hygiene technique     Problem: Metabolic/Fluid and Electrolytes - Adult  Goal: Electrolytes maintained within normal limits  Outcome: Progressing  Flowsheets (Taken 12/9/2022 2045)  Electrolytes maintained within normal limits:   Monitor labs and assess patient for signs and symptoms of electrolyte imbalances   Administer electrolyte replacement as ordered   Monitor response to electrolyte replacements, including repeat lab results as appropriate  Goal: Hemodynamic stability and optimal renal function maintained  Outcome: Progressing  Flowsheets (Taken 12/9/2022 2045)  Hemodynamic stability and optimal renal function maintained:   Monitor labs and assess for signs and symptoms of volume excess or deficit   Monitor intake, output and patient weight   Monitor response to interventions for patient's volume status, including labs, urine output, blood pressure (other measures as available)     Problem: Hematologic - Adult  Goal: Maintains

## 2022-12-10 NOTE — CARE COORDINATION
MARY BETH advised by [facility] that the co-pay card we sent in was a secondary payer meaning that if medical [employer] is not paying yet the discounts can't be applied. The patient will have to wait until the prior authorization process is completed to receive full discounts. MARY BETH advised charge nurse who will reach out to discharging MD and patient with next steps. MARY BETH will share feed back with case management team on Monday. If there are other needs that we can address in the interim please call the number listed below. Respectfully submitted,    ALTAGRACIA Mcleod  [facility]   [phone]    Electronically signed by ALTAGRACIA Garcias on 12/10/2022 at 5:07 PM

## 2022-12-10 NOTE — CARE COORDINATION
SW advised that patient was trying to get his xareto and couldn't today. He has medical [employer] insurance. SW placed phone call to [employer] to find out if there was a copay card he could use as this is the first time he's picking up this script. SW was able to obtain a copay card and fax it to their attention at [phone]. He may still need a pre-cert but they are taking care of it. Respectfully submitted,    SABI [facility]cleodS  [facility]   [phone]    Electronically signed by ALTAGRACIA Garcias on 12/10/2022 at 4:00 PM

## 2022-12-11 LAB
BLOOD CULTURE, ROUTINE: NORMAL
CULTURE, BLOOD 2: NORMAL

## 2022-12-11 NOTE — DISCHARGE SUMMARY
Hospital Medicine Discharge Summary     Patient ID: Edu Macias                 Patient's PCP: Meghan Maza MD     Admit Date: 12/5/2022      Discharge Date:   12/10/2022     Admitting Provider: Tiffany Welch DO     Discharge Provider: Lynda Doty MD      Discharge Diagnoses: Active Hospital Problems     Diagnosis      Acute pulmonary embolism without acute cor pulmonale (HCC) [I26.99]         Priority: Medium    Accidental drug overdose [T50.901A]         Priority: Medium    Influenza A [J10.1]         Priority: Medium    Fever and chills [R50.9]         Priority: Medium    Hematoma [T14. 8XXA]         Priority: Medium    Deep venous thrombosis (HCC) [I82.409]         Priority: Medium    Arteriovenous fistula (HCC) [I77.0]         Priority: Medium    Syncope [R55]         Priority: Medium    HTN (hypertension) [I10]         Priority: Medium    Elevated troponin [R77.8]         Priority: Medium    Syncope and collapse [R55]         Priority: Medium         The patient was seen and examined on day of discharge and this discharge summary is in conjunction with any daily progress note from day of discharge. Hospital Course:      From HPI:\"The patient is a 59 y.o. male with past [place] as below who presents to [facility] with concern per himself and the family that over the last few days he has had 2 episodes where he has apparently passed out once when he was waiting at a [employer] and apparently it took him about 5 hours to wake up to answer his wife's phone call while he was parked and apparently a second time while at home. Both these episodes however were unwitnessed so uncertain as to the etiology patient does not remember what exactly happened.   He apparently notes that he recently traveled from [facility] back home after his family vacation and that since that time he also lost about 10 pounds and has been somewhat increasingly fatigued to the point where he does stumble at times when walking according to his son who is at bedside. Son is also mention that apparently has had some slight intermittent slurred speech but it seems very difficult to detect at times. According to the patient he denies any drug use although he does smoke. He denies any other recent other symptoms of fever, chills, dizziness, chest pain, shortness of breath, dysuria, blood in urine/stool/sputum, nausea/vomiting/diarrhea/abdominal pain. He apparently did just recently start venlafaxine this past 4 days ago for depression but he has never had symptoms from any of his medications recently such as this. He does take metoprolol  mg apparently for hypertension he notes that he has been taking this for a little while but he does not take any other medications for hypertension. He denies that he has ever had any previous cardiac issues in the past.  He denies any history of cancer in himself although there might be a history of cancer in one of his family members but he does not member his full family history. \"     Pulmonary embolism with right leg DVT, off heparin drip now on Xarelto, 18 more days of 50 mg twice daily then 10 mg daily discussed with patient discussed with nurse. Patient advised to seek immediate medical help in case of any worsening. Patient also advised to follow-up with hematology for further work-up in case considering this PE as unprovoked, discussed with him potential etiologies including malignancy, patient verbalized understanding and willingness to follow-up  Large right thigh hematoma, vascular surgery consulted, recommended continue wrapping and no further surgical intervention, to note that per vascular expecting AV fistula closure spontaneously once off anticoagulation.   Elevated troponin, cardiology consulted no further intervention, follow-up as outpatient  Syncope, likely due to above  Essential hypertension, better controlled, beta-blocker was stopped due to bradycardia on lisinopril controlled follow-up with cardiology as outpatient. Reported drug overdose on home Xanax and opioid reported per family that he was taking more than he supposed to  Anemia, appears chronic, follow-up as outpatient  Fever, multiple spikes, molecular viral panel checked and positive for influenza, started on Tamiflu ID consulted  Nonspecific focus rounded signal abnormality on the MRI brain, neurology consulted no acute infarct              Physical Exam Performed:      /69   Pulse 66   Temp 98 °F (36.7 °C) (Oral)   Resp 16   Ht 5' 7\" (1.702 m)   Wt 177 lb 4 oz (80.4 kg)   SpO2 97%   BMI 27.76 kg/m²         General appearance:  No apparent distress  HEENT:  Normal cephalic  Neck: Supple  Respiratory:  Normal respiratory effort. Clear to auscultation, bilaterally without Rales/Wheezes/Rhonchi. Cardiovascular:  Regular rate and rhythm with normal S1/S2 without murmurs, rubs or gallops. Abdomen: Soft, non-tender, non-distended  Musculoskeletal:  No clubbing, cyanosis right leg wrapped in  Skin: Right leg ecchymosis  Neurologic: No focal weakness. Psychiatric:  Alert and oriented  Capillary Refill: Brisk,< 3 seconds   Peripheral Pulses: +2 palpable, equal bilaterally         Labs:  For convenience and continuity at follow-up the following most recent labs are provided:        CBC:          Lab Results   Component Value Date/Time     WBC 4.5 12/10/2022 04:45 AM     HGB 7.9 12/10/2022 04:45 AM     HCT 23.9 12/10/2022 04:45 AM      12/10/2022 04:45 AM         Renal:          Lab Results   Component Value Date/Time      12/10/2022 04:45 AM     K 4.2 12/10/2022 04:45 AM     K 4.0 12/05/2022 02:03 AM      12/10/2022 04:45 AM     CO2 24 12/10/2022 04:45 AM     BUN 12 12/10/2022 04:45 AM     CREATININE 0.6 12/10/2022 04:45 AM     CALCIUM 8.1 12/10/2022 04:45 AM     PHOS 3.6 12/10/2022 04:45 AM            Significant Diagnostic Studies     Radiology:   XR CHEST 1 VIEW evaluation of this finding to see if it   persists. A few scattered ground-glass opacities are seen throughout the lungs. Ground-glass opacities are nonspecific can be due to atelectasis,   postinflammatory-infectious change or early edema. 5 mm noncalcified pulmonary nodule right lower lobe       Abdomen pelvis:       No acute intra-abdominal abnormality       The findings were sent to the Radiology Results [id number] at 6:03   am on 12/5/2022 to be communicated to a licensed caregiver. RECOMMENDATIONS:   5 mm right solid pulmonary nodule. No routine follow-up imaging is   recommended per Fleischner Society Guidelines. These guidelines do not apply to immunocompromised patients and patients with   cancer. Follow up in patients with significant comorbidities as clinically   warranted. For lung cancer screening, adhere to Lung-RADS guidelines. Reference: Radiology. 2017; 284(1):228-43. CT Head W/O Contrast   Final Result   1. No acute intracranial hemorrhage or wedge-shaped area of ischemia. 2. Intracranial atherosclerosis with mild patchy white matter low attenuation   suggestive of chronic microvascular ischemic change. 3. Chronic paranasal sinus disease. XR CHEST (2 VW)   Final Result   Clear chest without acute cardiopulmonary process.                     Consults:      IP CONSULT TO CARDIOLOGY  IP CONSULT TO NEUROLOGY  IP CONSULT TO VASCULAR SURGERY  IP CONSULT TO INFECTIOUS DISEASES     Disposition: Home     Condition at Discharge: Stable     Discharge Instructions/Follow-up: PCP, cardiology, hematology, vascular surgery     Code Status:  Full Code      Activity: activity as tolerated     Diet: cardiac diet        Discharge Medications:      Discharge Medications    Current Discharge Medication List                    Details   !! rivaroxaban (XARELTO) 15 MG TABS tablet Take 1 tablet by mouth 2 times daily (with meals) for 36 doses  Qty: 36 tablet, Refills: 0       !! rivaroxaban (XARELTO) 20 MG TABS tablet Take 1 tablet by mouth daily (with breakfast)  Qty: 30 tablet, Refills: 0       lisinopril (PRINIVIL;ZESTRIL) 10 MG tablet Take 1 tablet by mouth daily  Qty: 30 tablet, Refills: 0       oseltamivir (TAMIFLU) 75 MG capsule Take 1 capsule by mouth 2 times daily for 8 doses  Qty: 8 capsule, Refills: 0       magnesium oxide (MAG-OX) 400 (240 Mg) MG tablet Take 1 tablet by mouth daily  Qty: 30 tablet, Refills: 0        !! - Potential duplicate medications found. Please discuss with provider. Details   ALPRAZolam (XANAX) 0.5 MG tablet Take 1 tablet by mouth nightly as needed for Sleep for up to 30 days. Qty: 30 tablet, Refills: 0     Associated Diagnoses: Anxiety       dicyclomine (BENTYL) 10 MG capsule TAKE ONE CAPSULE BY MOUTH THREE TIMES A DAY AS NEEDED FOR ABDOMINAL BLOATING  Qty: 90 capsule, Refills: 3       atorvastatin (LIPITOR) 20 MG tablet Take 1 tablet by mouth daily  Qty: 90 tablet, Refills: 1     Associated Diagnoses: Pure hypercholesterolemia       venlafaxine (EFFEXOR XR) 150 MG extended release capsule TAKE ONE CAPSULE BY MOUTH DAILY  Qty: 90 capsule, Refills: 1       Biotin 1 MG CAPS Take by mouth       B Complex Vitamins (VITAMIN B COMPLEX PO) Take 1 tablet by mouth daily       vitamin B-1 (THIAMINE) 100 MG tablet Take 100 mg by mouth daily       Glucosamine-Chondroitin (GLUCOSAMINE CHONDR COMPLEX PO) Take by mouth Take 2-3 by mouth every morning                    Time Spent on discharge: 45 MINUTES in the examination, evaluation, counseling and review of medications and discharge plan. Signed:     Chastity Del Valle MD        12/10/2022        Thank you Saeid Zimmerman MD for the opportunity to be involved in this patient's care. If you have any questions or concerns, please feel free to contact me at [phone].

## 2022-12-12 ENCOUNTER — TELEPHONE (OUTPATIENT)
Dept: SURGERY | Age: 57
End: 2022-12-12

## 2022-12-12 ENCOUNTER — TELEPHONE (OUTPATIENT)
Dept: FAMILY MEDICINE CLINIC | Age: 57
End: 2022-12-12

## 2022-12-12 NOTE — TELEPHONE ENCOUNTER
Mrs. Leann Horton called back, but wasn't sure who had called. I told her it was Rico Kind, but Rico Kind was unavailable at that time. I gave Mrs. Leann Horton Dr. Kelvin Rushing message and she was going to talk with Dr. Melo iWlson.

## 2022-12-12 NOTE — TELEPHONE ENCOUNTER
Fortunato Ye, DO  You 8 minutes ago (1:57 PM)     SM  Will have to go through PCP from my perspective he does not need to be off work for DVT and hematoma in his thigh. Compression garment or ace bandage should be worn however if sitting or standing for long periods of time.

## 2022-12-12 NOTE — TELEPHONE ENCOUNTER
Pt was in the hospital and was prescribed    Xarelto 15 mg. And 20 mg. Pt is taking the 15 mg. And then on 12/28 he is going to start the 20 mg. These meds are needing a PA to be done for insurance. Pt is also needing to make a hospital f/u for this week, nothing is available.     Pl advise Ramandeep Masterson @ [phone]

## 2022-12-12 NOTE — TELEPHONE ENCOUNTER
PT needs to have a letter sent to his employer stating how long the PT will need to be off work. Faxed to [phone] attention Martha Mann.   PT will be dropping off STD paperwork to be filled out

## 2022-12-13 ENCOUNTER — TELEPHONE (OUTPATIENT)
Dept: FAMILY MEDICINE CLINIC | Age: 57
End: 2022-12-13

## 2022-12-13 RX ORDER — IBUPROFEN 800 MG/1
TABLET ORAL
Qty: 20 TABLET | Refills: 0 | Status: SHIPPED
Start: 2022-12-13 | End: 2022-12-13 | Stop reason: HOSPADM

## 2022-12-14 ENCOUNTER — OFFICE VISIT (OUTPATIENT)
Dept: CARDIOLOGY CLINIC | Age: 57
End: 2022-12-14
Payer: COMMERCIAL

## 2022-12-14 VITALS
WEIGHT: 184.9 LBS | SYSTOLIC BLOOD PRESSURE: 114 MMHG | HEIGHT: 67 IN | HEART RATE: 74 BPM | BODY MASS INDEX: 29.02 KG/M2 | OXYGEN SATURATION: 93 % | DIASTOLIC BLOOD PRESSURE: 70 MMHG

## 2022-12-14 DIAGNOSIS — T14.8XXA HEMATOMA: ICD-10-CM

## 2022-12-14 DIAGNOSIS — I10 HYPERTENSION, UNSPECIFIED TYPE: Primary | ICD-10-CM

## 2022-12-14 DIAGNOSIS — I82.4Y1 ACUTE DEEP VEIN THROMBOSIS (DVT) OF PROXIMAL VEIN OF RIGHT LOWER EXTREMITY (HCC): ICD-10-CM

## 2022-12-14 DIAGNOSIS — G47.33 OSA (OBSTRUCTIVE SLEEP APNEA): ICD-10-CM

## 2022-12-14 PROCEDURE — G8417 CALC BMI ABV UP PARAM F/U: HCPCS | Performed by: NURSE PRACTITIONER

## 2022-12-14 PROCEDURE — 93000 ELECTROCARDIOGRAM COMPLETE: CPT | Performed by: NURSE PRACTITIONER

## 2022-12-14 PROCEDURE — G8484 FLU IMMUNIZE NO ADMIN: HCPCS | Performed by: NURSE PRACTITIONER

## 2022-12-14 PROCEDURE — 4004F PT TOBACCO SCREEN RCVD TLK: CPT | Performed by: NURSE PRACTITIONER

## 2022-12-14 PROCEDURE — G8427 DOCREV CUR MEDS BY ELIG CLIN: HCPCS | Performed by: NURSE PRACTITIONER

## 2022-12-14 PROCEDURE — 3078F DIAST BP <80 MM HG: CPT | Performed by: NURSE PRACTITIONER

## 2022-12-14 PROCEDURE — 1111F DSCHRG MED/CURRENT MED MERGE: CPT | Performed by: NURSE PRACTITIONER

## 2022-12-14 PROCEDURE — 99215 OFFICE O/P EST HI 40 MIN: CPT | Performed by: NURSE PRACTITIONER

## 2022-12-14 PROCEDURE — 3017F COLORECTAL CA SCREEN DOC REV: CPT | Performed by: NURSE PRACTITIONER

## 2022-12-14 PROCEDURE — 3074F SYST BP LT 130 MM HG: CPT | Performed by: NURSE PRACTITIONER

## 2022-12-14 NOTE — PROGRESS NOTES
[address]   Cardiology Office Visit      Date: 12/14/2022  CC:    Chief Complaint   Patient presents with    Follow-Up from Hospital       I had the privilege of visiting Estuardo Hay in the office. He presents today for follow up after recent hospitalization. He presents with his spouse. Patient was hospitalized for altered mentation and found to have extensive right leg DVT and PE. He was evaluated by vascular surgery for the DVT secondary to concern for hematoma and AV fistula. Cardiology was consulted for elevated troponin. During hospitalization, patient was on anticoagulation therapy with heparin gtt and currently remains on loading dose of Xarelto. Of note, patient with reported accidental Rx overdose of Xanax and opioids. Patient presents today using crutches. He has been compliant with his medications. He reports significant LE pain and becomes short of breath when trying to ambulate. Patient reports tightness and overall pain to palpation of right leg has decreased however he is \"barely able to sit or move around. \"  Patient reports he is not scheduled to f/u with vascular until after the first of the year. HPI: Estuardo Hay is a 59 y.o.      Past Medical History:   Diagnosis Date    Anxiety     Depression     Hyperlipidemia     Hypertension     borderline     Osteoarthritis     knees    Torn ACL 2003    left, right    Torn meniscus     left, right        Past Surgical History:   Procedure Laterality Date    COLONOSCOPY N/A 4/13/2021    COLONOSCOPY DIAGNOSTIC performed by Cristian Lombardo MD at [address] [place] [address]      tooth extraction    KNEE ARTHROSCOPY Left        Allergies:  No Known Allergies    Medication:  Current Outpatient Medications   Medication Sig Dispense Refill    rivaroxaban (XARELTO) 15 MG TABS tablet Take 1 tablet by mouth 2 times daily (with meals) for 36 doses 36 tablet 0    [START ON 12/28/2022] rivaroxaban (XARELTO) 20 MG TABS tablet Take 1 tablet by mouth daily (with breakfast) 30 tablet 0    lisinopril (PRINIVIL;ZESTRIL) 10 MG tablet Take 1 tablet by mouth daily 30 tablet 0    oseltamivir (TAMIFLU) 75 MG capsule Take 1 capsule by mouth 2 times daily for 8 doses 8 capsule 0    magnesium oxide (MAG-OX) 400 (240 Mg) MG tablet Take 1 tablet by mouth daily 30 tablet 0    ALPRAZolam (XANAX) 0.5 MG tablet Take 1 tablet by mouth nightly as needed for Sleep for up to 30 days. 30 tablet 0    dicyclomine (BENTYL) 10 MG capsule TAKE ONE CAPSULE BY MOUTH THREE TIMES A DAY AS NEEDED FOR ABDOMINAL BLOATING 90 capsule 3    atorvastatin (LIPITOR) 20 MG tablet Take 1 tablet by mouth daily 90 tablet 1    venlafaxine (EFFEXOR XR) 150 MG extended release capsule TAKE ONE CAPSULE BY MOUTH DAILY (Patient taking differently: Take 225 mg by mouth daily) 90 capsule 1    Biotin 1 MG CAPS Take by mouth      B Complex Vitamins (VITAMIN B COMPLEX PO) Take 1 tablet by mouth daily      vitamin B-1 (THIAMINE) 100 MG tablet Take 100 mg by mouth daily      Glucosamine-Chondroitin (GLUCOSAMINE CHONDR COMPLEX PO) Take by mouth Take 2-3 by mouth every morning       No current facility-administered medications for this visit. Social History:  Reviewed. reports that he has been smoking cigarettes. He started smoking about 41 years ago. He has been smoking an average of 1 pack per day. He has never used smokeless tobacco. He reports current alcohol use of about 6.0 standard drinks per week. He reports that he does not use drugs. Family History:  Reviewed. family history includes Cancer in his maternal grandfather; Other in his father and mother.        Review of System:     General ROS: negative for chills, fever, change in weight   Psychological ROS: negative for  anxiety or depression  Ophthalmic ROS: negative for  eye pain or loss of vision  ENT ROS: negative for  headaches, sore throat   Allergy and Immunology ROS: negative for  hives  Hematological and Lymphatic ROS: negative for  bleeding problems, blood clots, bruising or jaundice  Endocrine ROS: negative for  skin changes, temperature intolerance or unexpected weight changes  Respiratory ROS: negative for  cough, sputum, wheezing, shortness of breath   Cardiovascular ROS: Per HPI. Gastrointestinal ROS: negative for abdominal pain, diarrhea, nausea/vomiting, bleeding   Musculoskeletal ROS: +swelling, pain    Neurological ROS: negative for  confusion, numbness/tingling, seizures, weakness  Dermatological ROS: negative for rash, changes in skin color, lesions     Physical Examination:  /70   Pulse 74   Ht 5' 7\" (1.702 m)   Wt 184 lb 14.4 oz (83.9 kg)   SpO2 93%   BMI 28.96 kg/m²     Constitutional: Oriented. + pain . Head: Normocephalic and atraumatic. Mouth/Throat: Oropharynx is clear and moist.   Eyes: Conjunctivae normal. EOM are normal.   Cardiovascular: Normal rate, regular rhythm, S1&S2 and intact distal pulses. Pulmonary/Chest: Bilateral respiratory sounds. No wheezes. No rhonchi. Abdominal: Soft. Bowel sounds present. No distension, No tenderness. Musculoskeletal: RLE-posterior thigh-large scattered hematoma,ecchymosis present-anterior to thigh is soft, intact distal pulses, +2+3 edema    Neurological: Alert and oriented. Cranial nerve appears intact, No Gross deficit   Skin: Skin is warm and dry. No rash noted. Psychiatric: Has a normal mood, affect and behavior       Labs:  Lab Results   Component Value Date    CREATININE 0.6 (L) 12/10/2022    BUN 12 12/10/2022     12/10/2022    K 4.2 12/10/2022     12/10/2022    CO2 24 12/10/2022       FASTING LIPID PANEL:  Lab Results   Component Value Date/Time    HDL 54 10/14/2022 04:11 PM    [address] 10/14/2022 04:11 PM    TRIG 194 10/14/2022 04:11 PM         Diagnostics:      EC22  SR     Echo:   Normal left ventricle cavity size. Mild concentric hypertrophy. Ejection fraction is visually estimated to be 55-60 %.   Indeterminate diastolic function. Mildly thickened mitral valve leaflets. The left atrium is mildly dilated. Mild aortic regurgitation. Mild right ventricle dilation. Normal right ventricle systolic function. .  The right atrium is dilated. Cyst seen in the liver measuring 3.67 cm x 5.26 cm. A bubble study was performed and fails to show evidence of shunting  . CTA   LE 22  1. Large right thigh hematoma but no evidence of active bleeding or   pseudoaneurysm. No evidence of AV fistula. 2. Unremarkable arterial circulation of the right lower extremity. No   evidence of arterial aneurysm, dissection or occlusion. Stress Testin22  Summary  Normal myocardial perfusion study. Normal LV size and systolic function. Non-diagnostic EKG response due to failure to reach target heart rate. Overall findings represent a low risk study. Assessment:   Plan:  Right leg DVT and hematoma    With AV fistula    Encouraged continued compression    Continue anticoagulation therapy    Intact distal pulses and anterior leg soft    Pt not discharged with pain medication secondary to OD    On tylenol only   Encouraged pt and family to call vascular to be seen in office sooner    If pain worsening or additional change in symptoms needs to be evaluated in ED     2. Acute PE    Echo stable during hospitalization and decision for medical mgmt only   Continue anticoagulation    F/u in ED for worsening shortness of breath       3. Elevated troponin    Likely secondary to PE   Negative stress testing    Continue risk factor modifications     4. Likely MIK   Pt spouse requesting sleep center referral for snoring    Instructed to follow up after acute issues with DVT/PE resolved    Consult entered       Further evaluation will be based upon the patient's clinical course and testing results. All questions and concerns were addressed to the patient/family. Alternatives to my treatment were discussed.      Called and updated Dr Taj Mills office and requesting [facility] f/u     Rojelio Ching, APRN-CNP  [facility]  Cardiology  12/14/2022  4:00 PM

## 2022-12-14 NOTE — TELEPHONE ENCOUNTER
I called patient about her refill request that was filled for ibuprofen. I did look over his chart and he has an acute DVT and PE and is on Xarelto. He is instructed not to pick to stop and not to take it and he states he knows this and was not.

## 2022-12-15 ENCOUNTER — OFFICE VISIT (OUTPATIENT)
Dept: FAMILY MEDICINE CLINIC | Age: 57
End: 2022-12-15
Payer: COMMERCIAL

## 2022-12-15 VITALS
HEIGHT: 67 IN | TEMPERATURE: 99 F | WEIGHT: 183 LBS | DIASTOLIC BLOOD PRESSURE: 80 MMHG | HEART RATE: 61 BPM | SYSTOLIC BLOOD PRESSURE: 127 MMHG | OXYGEN SATURATION: 95 % | BODY MASS INDEX: 28.72 KG/M2

## 2022-12-15 DIAGNOSIS — I26.99 MULTIPLE PULMONARY EMBOLI (HCC): ICD-10-CM

## 2022-12-15 DIAGNOSIS — S80.11XD LEG HEMATOMA, RIGHT, SUBSEQUENT ENCOUNTER: Primary | ICD-10-CM

## 2022-12-15 DIAGNOSIS — D50.0 IRON DEFICIENCY ANEMIA DUE TO CHRONIC BLOOD LOSS: ICD-10-CM

## 2022-12-15 DIAGNOSIS — I82.411 ACUTE DEEP VEIN THROMBOSIS (DVT) OF FEMORAL VEIN OF RIGHT LOWER EXTREMITY (HCC): ICD-10-CM

## 2022-12-15 DIAGNOSIS — R41.82 ALTERED MENTAL STATUS, UNSPECIFIED ALTERED MENTAL STATUS TYPE: ICD-10-CM

## 2022-12-15 PROCEDURE — G8427 DOCREV CUR MEDS BY ELIG CLIN: HCPCS | Performed by: INTERNAL MEDICINE

## 2022-12-15 PROCEDURE — G8484 FLU IMMUNIZE NO ADMIN: HCPCS | Performed by: INTERNAL MEDICINE

## 2022-12-15 PROCEDURE — 3074F SYST BP LT 130 MM HG: CPT | Performed by: INTERNAL MEDICINE

## 2022-12-15 PROCEDURE — 3017F COLORECTAL CA SCREEN DOC REV: CPT | Performed by: INTERNAL MEDICINE

## 2022-12-15 PROCEDURE — 99214 OFFICE O/P EST MOD 30 MIN: CPT | Performed by: INTERNAL MEDICINE

## 2022-12-15 PROCEDURE — G8417 CALC BMI ABV UP PARAM F/U: HCPCS | Performed by: INTERNAL MEDICINE

## 2022-12-15 PROCEDURE — 1111F DSCHRG MED/CURRENT MED MERGE: CPT | Performed by: INTERNAL MEDICINE

## 2022-12-15 PROCEDURE — 4004F PT TOBACCO SCREEN RCVD TLK: CPT | Performed by: INTERNAL MEDICINE

## 2022-12-15 PROCEDURE — 3078F DIAST BP <80 MM HG: CPT | Performed by: INTERNAL MEDICINE

## 2022-12-15 NOTE — PROGRESS NOTES
SUBJECTIVE:  Rm Solano is a 59 y.o. male being evaluated for:    Chief Complaint   Patient presents with    Follow-Up from Hospital      Patient is here for hospital followup today . Other      Patient wants disability forum fill our . HPI Passed out at work and in his Charlee jonathan fell asleep and busted headlight and was missing for 7 hours  NO one could find him and made it home     Patient found passing out twice in the shower  On the floor for 4 1/2 hours  Got up and went on    Speech was slurring and patient felt fine and refused to go to the ed  Nearly chocking on food  Falling asleep  Vanessa went to ed the next day  BP high Found to have PE and then found to have a blood clot in his right leg     No Known Allergies  Current Outpatient Medications   Medication Sig Dispense Refill    rivaroxaban (XARELTO) 15 MG TABS tablet Take 1 tablet by mouth 2 times daily (with meals) for 36 doses 36 tablet 0    [START ON 12/28/2022] rivaroxaban (XARELTO) 20 MG TABS tablet Take 1 tablet by mouth daily (with breakfast) 30 tablet 0    lisinopril (PRINIVIL;ZESTRIL) 10 MG tablet Take 1 tablet by mouth daily 30 tablet 0    magnesium oxide (MAG-OX) 400 (240 Mg) MG tablet Take 1 tablet by mouth daily 30 tablet 0    dicyclomine (BENTYL) 10 MG capsule TAKE ONE CAPSULE BY MOUTH THREE TIMES A DAY AS NEEDED FOR ABDOMINAL BLOATING 90 capsule 3    atorvastatin (LIPITOR) 20 MG tablet Take 1 tablet by mouth daily 90 tablet 1    venlafaxine (EFFEXOR XR) 150 MG extended release capsule TAKE ONE CAPSULE BY MOUTH DAILY (Patient taking differently: Take 225 mg by mouth daily) 90 capsule 1    Biotin 1 MG CAPS Take by mouth      B Complex Vitamins (VITAMIN B COMPLEX PO) Take 1 tablet by mouth daily      vitamin B-1 (THIAMINE) 100 MG tablet Take 100 mg by mouth daily      Glucosamine-Chondroitin (GLUCOSAMINE CHONDR COMPLEX PO) Take by mouth Take 2-3 by mouth every morning       No current facility-administered medications for this visit. Social History     Socioeconomic History    Marital status:      Spouse name: Not on file    Number of children: Not on file    Years of education: Not on file    Highest education level: Not on file   Occupational History    Not on file   Tobacco Use    Smoking status: Every Day     Packs/day: 1.00     Types: Cigarettes     Start date: 0     Last attempt to quit: 12/15/2021     Years since quittin.0    Smokeless tobacco: Never   Vaping Use    Vaping Use: Former   Substance and Sexual Activity    Alcohol use: Yes     Alcohol/week: 6.0 standard drinks     Types: 3 Cans of beer, 3 Shots of liquor per week     Comment: social drink. Drug use: No    Sexual activity: Yes     Partners: Female   Other Topics Concern    Not on file   Social History Narrative    Not on file     Social Determinants of Health     Financial Resource Strain: Low Risk     Difficulty of Paying Living Expenses: Not hard at all   Food Insecurity: No Food Insecurity    Worried About [address] [employer] in the Last Year: Never true    [address] [employer] in the Last Year: Never true   Transportation Needs: No Transportation Needs    Lack of Transportation (Medical): No    Lack of Transportation (Non-Medical): No   Physical Activity: Not on file   Stress: Not on file   Social Connections: Not on file   Intimate Partner Violence: Not on file   Housing Stability: Not on file      Past Medical History:   Diagnosis Date    Anxiety     Depression     Hyperlipidemia     Hypertension     borderline     Osteoarthritis     knees    Torn ACL     left, right    Torn meniscus     left, right     Past Surgical History:   Procedure Laterality Date    COLONOSCOPY N/A 2021    COLONOSCOPY DIAGNOSTIC performed by Charline Braga MD at [address] [place] [address]      tooth extraction    KNEE ARTHROSCOPY Left        Review of Systems   Constitutional:  Positive for activity change and fatigue.  Negative for chills, fever and unexpected weight change. HENT:  Positive for trouble swallowing. Respiratory:  Positive for shortness of breath (eith exertion). Negative for cough. Cardiovascular:  Positive for leg swelling. Negative for chest pain and palpitations. Gastrointestinal:  Negative for anal bleeding, blood in stool, diarrhea, nausea and vomiting. Genitourinary:  Negative for dysuria. Neurological:  Positive for syncope and speech difficulty. Negative for dizziness, light-headedness and headaches. Psychiatric/Behavioral:  Positive for confusion. OBJECTIVE:  /80 (Site: Left Upper Arm, Position: Sitting, Cuff Size: Medium Adult)   Pulse 61   Temp 99 °F (37.2 °C) (Oral)   Ht 5' 7\" (1.702 m)   Wt 183 lb (83 kg)   SpO2 95%   BMI 28.66 kg/m²      Body mass index is 28.66 kg/m². Physical Exam  Constitutional:       General: He is not in acute distress. Appearance: Normal appearance. He is well-developed. He is obese. HENT:      Head: Normocephalic and atraumatic. Eyes:      Conjunctiva/sclera: Conjunctivae normal.   Neck:      Thyroid: No thyromegaly. Vascular: No JVD. Trachea: No tracheal deviation. Cardiovascular:      Rate and Rhythm: Normal rate and regular rhythm. Heart sounds: Normal heart sounds. No murmur heard. No friction rub. No gallop. Pulmonary:      Effort: Pulmonary effort is normal.      Breath sounds: Normal breath sounds. Chest:      Chest wall: No tenderness. Abdominal:      General: There is no distension. Palpations: Abdomen is soft. Tenderness: There is no abdominal tenderness. Comments: No  hsm    Genitourinary:     Penis: No tenderness. Musculoskeletal:         General: Deformity present. No swelling. Cervical back: Neck supple. Right lower leg: Edema present. Comments: Right wrist in spint   Lymphadenopathy:      Cervical: No cervical adenopathy. Skin:     General: Skin is warm and dry.       Findings: Bruising (right thigh) present. Neurological:      General: No focal deficit present. Mental Status: He is alert. Motor: No abnormal muscle tone. Gait: Gait normal.      Deep Tendon Reflexes: Reflexes are normal and symmetric. Psychiatric:         Behavior: Behavior normal.         Thought Content: Thought content normal.      Comments: Anxious        ASSESSMENT/PLAN:    Leti Veloz was seen today for follow-up from hospital and other. Diagnoses and all orders for this visit:    Leg hematoma, right, subsequent encounter  Following up with vascular     Multiple pulmonary emboli (HCC) on loading dose of xarelto     Acute deep vein thrombosis (DVT) of femoral vein of right lower extremity ([address] same as above     Iron deficiency anemia due to chronic blood loss  -     CBC with Auto Differential; Future  -     Ferritin; Future  -     Iron and TIBC; Future    Altered mental status, unspecified altered mental status type question due to to PE        No orders of the defined types were placed in this encounter. Return in about 19 days (around 1/3/2023). There are no Patient Instructions on file for this visit.

## 2022-12-19 ENCOUNTER — OFFICE VISIT (OUTPATIENT)
Dept: VASCULAR SURGERY | Age: 57
End: 2022-12-19
Payer: COMMERCIAL

## 2022-12-19 VITALS — BODY MASS INDEX: 28.66 KG/M2 | HEIGHT: 67 IN

## 2022-12-19 DIAGNOSIS — D50.0 IRON DEFICIENCY ANEMIA DUE TO CHRONIC BLOOD LOSS: ICD-10-CM

## 2022-12-19 DIAGNOSIS — D64.9 ANEMIA, UNSPECIFIED TYPE: Primary | ICD-10-CM

## 2022-12-19 DIAGNOSIS — I77.0 ARTERIOVENOUS FISTULA (HCC): ICD-10-CM

## 2022-12-19 DIAGNOSIS — I82.401 ACUTE DEEP VEIN THROMBOSIS (DVT) OF RIGHT LOWER EXTREMITY, UNSPECIFIED VEIN (HCC): Primary | ICD-10-CM

## 2022-12-19 LAB
BASOPHILS ABSOLUTE: 0 K/UL (ref 0–0.2)
BASOPHILS RELATIVE PERCENT: 0.5 %
EOSINOPHILS ABSOLUTE: 0.1 K/UL (ref 0–0.6)
EOSINOPHILS RELATIVE PERCENT: 0.8 %
FERRITIN: 574 NG/ML (ref 30–400)
HCT VFR BLD CALC: 28.9 % (ref 40.5–52.5)
HEMOGLOBIN: 9.2 G/DL (ref 13.5–17.5)
IRON SATURATION: 32 % (ref 20–50)
IRON: 88 UG/DL (ref 59–158)
LYMPHOCYTES ABSOLUTE: 1.3 K/UL (ref 1–5.1)
LYMPHOCYTES RELATIVE PERCENT: 20 %
MCH RBC QN AUTO: 30.5 PG (ref 26–34)
MCHC RBC AUTO-ENTMCNC: 32 G/DL (ref 31–36)
MCV RBC AUTO: 95.2 FL (ref 80–100)
MONOCYTES ABSOLUTE: 0.5 K/UL (ref 0–1.3)
MONOCYTES RELATIVE PERCENT: 8.3 %
NEUTROPHILS ABSOLUTE: 4.5 K/UL (ref 1.7–7.7)
NEUTROPHILS RELATIVE PERCENT: 70.4 %
PDW BLD-RTO: 20.4 % (ref 12.4–15.4)
PLATELET # BLD: 716 K/UL (ref 135–450)
PMV BLD AUTO: 7 FL (ref 5–10.5)
RBC # BLD: 3.03 M/UL (ref 4.2–5.9)
TOTAL IRON BINDING CAPACITY: 273 UG/DL (ref 260–445)
WBC # BLD: 6.4 K/UL (ref 4–11)

## 2022-12-19 PROCEDURE — 4004F PT TOBACCO SCREEN RCVD TLK: CPT | Performed by: STUDENT IN AN ORGANIZED HEALTH CARE EDUCATION/TRAINING PROGRAM

## 2022-12-19 PROCEDURE — 99213 OFFICE O/P EST LOW 20 MIN: CPT | Performed by: STUDENT IN AN ORGANIZED HEALTH CARE EDUCATION/TRAINING PROGRAM

## 2022-12-19 PROCEDURE — 3017F COLORECTAL CA SCREEN DOC REV: CPT | Performed by: STUDENT IN AN ORGANIZED HEALTH CARE EDUCATION/TRAINING PROGRAM

## 2022-12-19 PROCEDURE — G8417 CALC BMI ABV UP PARAM F/U: HCPCS | Performed by: STUDENT IN AN ORGANIZED HEALTH CARE EDUCATION/TRAINING PROGRAM

## 2022-12-19 PROCEDURE — G8484 FLU IMMUNIZE NO ADMIN: HCPCS | Performed by: STUDENT IN AN ORGANIZED HEALTH CARE EDUCATION/TRAINING PROGRAM

## 2022-12-19 PROCEDURE — G8427 DOCREV CUR MEDS BY ELIG CLIN: HCPCS | Performed by: STUDENT IN AN ORGANIZED HEALTH CARE EDUCATION/TRAINING PROGRAM

## 2022-12-19 PROCEDURE — 1111F DSCHRG MED/CURRENT MED MERGE: CPT | Performed by: STUDENT IN AN ORGANIZED HEALTH CARE EDUCATION/TRAINING PROGRAM

## 2022-12-19 NOTE — PROGRESS NOTES
Sabrina Livingston (:  1965) is a 59 y.o. male,Established patient, here for evaluation of the following chief complaint(s):  Follow-Up from Hospital         ASSESSMENT/PLAN:  1. Acute deep vein thrombosis (DVT) of right lower extremity, unspecified vein (HCC)  2. Arteriovenous fistula [facility])    This is a 59year old male patient who has known DVT and incidentally and regionally located AV fistula. Suspect that both of these were a consequence of his hematoma that was compressive and caused both to occur at the same location as discovered on duplex. Would re-image in 3 months to look for closure of this fistula. Continue anticoagulation for likely provoked DVT 3-6 months. Given prescription for thigh high compression in case the patient develops significant edema. All questions answered. Subjective   SUBJECTIVE/OBJECTIVE:  This is a 59year old male patient who presents for hospital follow up after experiencing falls and subsequently a traumatic right thigh hematoma with likely trauma-induced primary AV fistula and deep vein thrombosis. His swelling is improving daily. His pain is nearly gone. He is overall feeling better. He is experiencing bruising move down his leg. No claudication or heavy sensation. No calf swelling. Objective   Physical Exam  Constitutional:       Appearance: Normal appearance. Cardiovascular:      Rate and Rhythm: Normal rate and regular rhythm. Pulses: Normal pulses. Pulmonary:      Effort: Pulmonary effort is normal. No respiratory distress. Musculoskeletal:         General: Normal range of motion. Right lower leg: No edema. Left lower leg: No edema. Skin:     Capillary Refill: Capillary refill takes less than 2 seconds. Findings: Bruising present. Neurological:      Mental Status: He is alert.           Majo Kilgore DO, FACS, FSVS, [address] [facility]

## 2022-12-25 ASSESSMENT — ENCOUNTER SYMPTOMS
NAUSEA: 0
ANAL BLEEDING: 0
TROUBLE SWALLOWING: 1
BLOOD IN STOOL: 0
DIARRHEA: 0
SHORTNESS OF BREATH: 1
COUGH: 0
VOMITING: 0

## 2022-12-26 ENCOUNTER — HOSPITAL ENCOUNTER (EMERGENCY)
Age: 57
Discharge: HOME | End: 2022-12-26
Payer: COMMERCIAL

## 2022-12-26 ENCOUNTER — APPOINTMENT (OUTPATIENT)
Dept: CT IMAGING | Age: 57
End: 2022-12-26
Payer: COMMERCIAL

## 2022-12-26 VITALS
WEIGHT: 189.82 LBS | HEIGHT: 67 IN | RESPIRATION RATE: 14 BRPM | OXYGEN SATURATION: 96 % | SYSTOLIC BLOOD PRESSURE: 144 MMHG | BODY MASS INDEX: 29.79 KG/M2 | HEART RATE: 90 BPM | DIASTOLIC BLOOD PRESSURE: 75 MMHG | TEMPERATURE: 98.2 F

## 2022-12-26 DIAGNOSIS — F11.10 FENTANYL USE DISORDER, MILD, ABUSE (HCC): Primary | ICD-10-CM

## 2022-12-26 LAB
AMORPHOUS: PRESENT
AMPHETAMINE SCREEN, URINE: ABNORMAL
ANION GAP SERPL CALCULATED.3IONS-SCNC: 8 MMOL/L (ref 3–16)
BACTERIA: ABNORMAL /HPF
BARBITURATE SCREEN URINE: ABNORMAL
BASOPHILS ABSOLUTE: 0 K/UL (ref 0–0.2)
BASOPHILS RELATIVE PERCENT: 0.6 %
BENZODIAZEPINE SCREEN, URINE: ABNORMAL
BILIRUBIN URINE: NEGATIVE
BLOOD, URINE: NEGATIVE
BUN BLDV-MCNC: 22 MG/DL (ref 7–20)
CALCIUM OXALATE CRYSTALS: PRESENT
CALCIUM SERPL-MCNC: 9.2 MG/DL (ref 8.3–10.6)
CANNABINOID SCREEN URINE: ABNORMAL
CHLORIDE BLD-SCNC: 100 MMOL/L (ref 99–110)
CLARITY: ABNORMAL
CO2: 24 MMOL/L (ref 21–32)
COCAINE METABOLITE SCREEN URINE: ABNORMAL
COLOR: ABNORMAL
CREAT SERPL-MCNC: 1 MG/DL (ref 0.9–1.3)
EOSINOPHILS ABSOLUTE: 0 K/UL (ref 0–0.6)
EOSINOPHILS RELATIVE PERCENT: 0.6 %
EPITHELIAL CELLS, UA: 1 /HPF (ref 0–5)
FENTANYL SCREEN, URINE: POSITIVE
GFR SERPL CREATININE-BSD FRML MDRD: >60 ML/MIN/{1.73_M2}
GLUCOSE BLD-MCNC: 113 MG/DL (ref 70–99)
GLUCOSE URINE: NEGATIVE MG/DL
HCT VFR BLD CALC: 27.6 % (ref 40.5–52.5)
HEMOGLOBIN: 8.9 G/DL (ref 13.5–17.5)
HYALINE CASTS: 9 /LPF (ref 0–8)
HYALINE CASTS: PRESENT
KETONES, URINE: NEGATIVE MG/DL
LEUKOCYTE ESTERASE, URINE: NEGATIVE
LYMPHOCYTES ABSOLUTE: 0.8 K/UL (ref 1–5.1)
LYMPHOCYTES RELATIVE PERCENT: 13.3 %
Lab: ABNORMAL
MCH RBC QN AUTO: 30.5 PG (ref 26–34)
MCHC RBC AUTO-ENTMCNC: 32.1 G/DL (ref 31–36)
MCV RBC AUTO: 95.1 FL (ref 80–100)
METHADONE SCREEN, URINE: ABNORMAL
MICROSCOPIC EXAMINATION: YES
MONOCYTES ABSOLUTE: 0.9 K/UL (ref 0–1.3)
MONOCYTES RELATIVE PERCENT: 13.4 %
NEUTROPHILS ABSOLUTE: 4.6 K/UL (ref 1.7–7.7)
NEUTROPHILS RELATIVE PERCENT: 72.1 %
NITRITE, URINE: NEGATIVE
OPIATE SCREEN URINE: ABNORMAL
OXYCODONE URINE: ABNORMAL
PDW BLD-RTO: 20 % (ref 12.4–15.4)
PH UA: 5
PH UA: 5 (ref 5–8)
PHENCYCLIDINE SCREEN URINE: ABNORMAL
PLATELET # BLD: 345 K/UL (ref 135–450)
PMV BLD AUTO: 6.8 FL (ref 5–10.5)
POTASSIUM REFLEX MAGNESIUM: 4.1 MMOL/L (ref 3.5–5.1)
PROTEIN UA: ABNORMAL MG/DL
RBC # BLD: 2.9 M/UL (ref 4.2–5.9)
RBC UA: 4 /HPF (ref 0–4)
SODIUM BLD-SCNC: 132 MMOL/L (ref 136–145)
SPECIFIC GRAVITY UA: 1.02 (ref 1–1.03)
TROPONIN: 0.02 NG/ML
URINE REFLEX TO CULTURE: ABNORMAL
URINE TYPE: ABNORMAL
UROBILINOGEN, URINE: 0.2 E.U./DL
WBC # BLD: 6.4 K/UL (ref 4–11)
WBC UA: 0 /HPF (ref 0–5)

## 2022-12-26 PROCEDURE — 81001 URINALYSIS AUTO W/SCOPE: CPT

## 2022-12-26 PROCEDURE — 70450 CT HEAD/BRAIN W/O DYE: CPT

## 2022-12-26 PROCEDURE — 85025 COMPLETE CBC W/AUTO DIFF WBC: CPT

## 2022-12-26 PROCEDURE — 99284 EMERGENCY DEPT VISIT MOD MDM: CPT

## 2022-12-26 PROCEDURE — 80307 DRUG TEST PRSMV CHEM ANLYZR: CPT

## 2022-12-26 PROCEDURE — 84484 ASSAY OF TROPONIN QUANT: CPT

## 2022-12-26 PROCEDURE — 80048 BASIC METABOLIC PNL TOTAL CA: CPT

## 2022-12-26 ASSESSMENT — PAIN - FUNCTIONAL ASSESSMENT
PAIN_FUNCTIONAL_ASSESSMENT: NONE - DENIES PAIN
PAIN_FUNCTIONAL_ASSESSMENT: NONE - DENIES PAIN

## 2022-12-26 ASSESSMENT — LIFESTYLE VARIABLES
HOW MANY STANDARD DRINKS CONTAINING ALCOHOL DO YOU HAVE ON A TYPICAL DAY: 3 OR 4
HOW OFTEN DO YOU HAVE A DRINK CONTAINING ALCOHOL: 2-4 TIMES A MONTH

## 2022-12-26 NOTE — ED PROVIDER NOTES
**ADVANCED PRACTICE PROVIDER, I HAVE EVALUATED THIS PATIENT**        [address]      Pt Name: Maryann Mosley  ZCL:[id number]  [place] 1965  Date of evaluation: 12/26/2022  Provider: Aleah Huertas PA-C  Note Started: 6:44 PM EST 12/26/2022        Chief Complaint:    Chief Complaint   Patient presents with    Altered Mental Status     Wife called ambulance for  dozing off in the middle of conversations. Pt able to wake up and answers all questions, pt following commands but will fall asleep in between. Pt denies taking any pain medication, pt states he takes xanax for sleep. Pt states did not take it last night and did not sleep. Pt states this is why he is dozing off. EMS reports fsbs 183. VSS, denies pain or recent illness afebrile          Nursing Notes, Past Medical Hx, Past Surgical Hx, Social Hx, Allergies, and Family Hx were all reviewed and agreed with or any disagreements were addressed in the HPI.    HPI: (Location, Duration, Timing, Severity, Quality, Assoc Sx, Context, Modifying factors)    Chief Complaint of altered mental status. According to his wife he has been falling asleep very easily. And that is not normal for him. Normally when he does that he has had some medication and that he has had either Percocet or drugs. Patient denies any drug use. He denies taking any medication. He does admit to being on Xanax. And he said he did not take that last night. He just did not sleep last night as why is so tired. He is alert. He is answer questions appropriately. He denies chest pain, denies headache, denies weakness, no nausea vomiting, no abdominal pain. No extremity weakness. No cough, no fevers. No other complaints. This is a  59 y.o. male who presents to the emergency room with the above complaint.     PastMedical/Surgical History:      Diagnosis Date    Anxiety     Depression     DVT (deep venous thrombosis) ([facility] [address]     Hyperlipidemia     Hypertension     borderline     Osteoarthritis     knees    Torn ACL 2003    left, right    Torn meniscus     left, right         Procedure Laterality Date    COLONOSCOPY N/A 4/13/2021    COLONOSCOPY DIAGNOSTIC performed by Lissa Lorenzo MD at [address] [place] [address]      tooth extraction    KNEE ARTHROSCOPY Left        Medications:  Previous Medications    ATORVASTATIN (LIPITOR) 20 MG TABLET    Take 1 tablet by mouth daily    B COMPLEX VITAMINS (VITAMIN B COMPLEX PO)    Take 1 tablet by mouth daily    BIOTIN 1 MG CAPS    Take by mouth    DICYCLOMINE (BENTYL) 10 MG CAPSULE    TAKE ONE CAPSULE BY MOUTH THREE TIMES A DAY AS NEEDED FOR ABDOMINAL BLOATING    GLUCOSAMINE-CHONDROITIN (GLUCOSAMINE CHONDR COMPLEX PO)    Take by mouth Take 2-3 by mouth every morning    LISINOPRIL (PRINIVIL;ZESTRIL) 10 MG TABLET    Take 1 tablet by mouth daily    MAGNESIUM OXIDE (MAG-OX) 400 (240 MG) MG TABLET    Take 1 tablet by mouth daily    RIVAROXABAN (XARELTO) 15 MG TABS TABLET    Take 1 tablet by mouth 2 times daily (with meals) for 36 doses    RIVAROXABAN (XARELTO) 20 MG TABS TABLET    Take 1 tablet by mouth daily (with breakfast)    VENLAFAXINE (EFFEXOR XR) 150 MG EXTENDED RELEASE CAPSULE    TAKE ONE CAPSULE BY MOUTH DAILY    VITAMIN B-1 (THIAMINE) 100 MG TABLET    Take 100 mg by mouth daily         Review of Systems:  (2-9 systems needed)  Review of Systems   Constitutional:  Negative for chills and fever. HENT:  Negative for congestion and sore throat. Eyes:  Negative for pain and visual disturbance. Respiratory:  Negative for cough and shortness of breath. Cardiovascular:  Negative for chest pain and leg swelling. Gastrointestinal:  Negative for abdominal pain, nausea and vomiting. Genitourinary:  Negative for dysuria and frequency. Musculoskeletal:  Negative for back pain and neck pain. Skin:  Negative for rash and wound.    Neurological:  Negative for dizziness, weakness and light-headedness. \"Positives and Pertinent negatives as per HPI\"    Physical Exam:  Physical Exam  Vitals and nursing note reviewed. Constitutional:       Appearance: He is well-developed. He is not diaphoretic. HENT:      Head: Normocephalic and atraumatic. Nose: Nose normal.      Mouth/Throat:      Mouth: Mucous membranes are moist.      Pharynx: Oropharynx is clear. No oropharyngeal exudate or posterior oropharyngeal erythema. Eyes:      General: No scleral icterus. Right eye: No discharge. Left eye: No discharge. Extraocular Movements: Extraocular movements intact. Conjunctiva/sclera: Conjunctivae normal.      Pupils: Pupils are equal, round, and reactive to light. Neck:      Comments: No nuchal rigidity  Cardiovascular:      Rate and Rhythm: Normal rate and regular rhythm. Heart sounds: Normal heart sounds. No murmur heard. No friction rub. No gallop. Pulmonary:      Effort: Pulmonary effort is normal. No respiratory distress. Breath sounds: Normal breath sounds. No wheezing or rales. Chest:      Chest wall: No tenderness. Abdominal:      General: Abdomen is flat. Bowel sounds are normal. There is no distension. Palpations: Abdomen is soft. There is no mass. Tenderness: There is no abdominal tenderness. There is no guarding. Musculoskeletal:         General: Normal range of motion. Cervical back: Normal range of motion and neck supple. Skin:     General: Skin is warm and dry. Neurological:      General: No focal deficit present. Mental Status: He is alert and oriented to person, place, and time. He is not disoriented. GCS: GCS eye subscore is 4. GCS verbal subscore is 5. GCS motor subscore is 6. Cranial Nerves: Cranial nerves 2-12 are intact. No cranial nerve deficit. Sensory: Sensation is intact. No sensory deficit. Motor: Motor function is intact.  No tremor, abnormal muscle tone or seizure activity. Coordination: Coordination is intact. Coordination normal.      Gait: Gait is intact. Comments: Finger to nose normal   Psychiatric:         Behavior: Behavior normal.       MEDICAL DECISION MAKING    Vitals:    Vitals:    12/26/22 1645 12/26/22 1745 12/26/22 1756 12/26/22 1815   BP: 108/74 117/75  115/67   Pulse: 83 80 83 98   Resp: 13 (!) 9 15 15   Temp:       SpO2: 93% (!) 87% 96% 96%   Weight:       Height:           LABS:  Labs Reviewed   CBC WITH AUTO DIFFERENTIAL - Abnormal; Notable for the following components:       Result Value    RBC 2.90 (*)     Hemoglobin 8.9 (*)     Hematocrit 27.6 (*)     RDW 20.0 (*)     Lymphocytes Absolute 0.8 (*)     All other components within normal limits   BASIC METABOLIC PANEL W/ REFLEX TO MG FOR LOW K - Abnormal; Notable for the following components:    Sodium 132 (*)     Glucose 113 (*)     BUN 22 (*)     All other components within normal limits   URINE DRUG SCREEN - Abnormal; Notable for the following components:    FENTANYL SCREEN, URINE POSITIVE (*)     All other components within normal limits   URINALYSIS WITH REFLEX TO CULTURE - Abnormal; Notable for the following components:    Color, UA DARK YELLOW (*)     Clarity, UA CLOUDY (*)     Protein, UA TRACE (*)     All other components within normal limits   TROPONIN - Abnormal; Notable for the following components:    Troponin 0.02 (*)     All other components within normal limits   MICROSCOPIC URINALYSIS - Abnormal; Notable for the following components:    Hyaline Casts, UA 9 (*)     Hyaline Casts, UA Present (*)     Calcium Oxalate Crystals Present (*)     Amorphous, UA Present (*)     All other components within normal limits        Remainder of labs reviewed and were negative at this time or not returned at the time of this note.     RADIOLOGY:   Non-plain film images such as CT, Ultrasound and MRI are read by the radiologist. Elvia Wang PA-C have directly visualized the radiologic plain film image(s) with the below findings:      Interpretation per the Radiologist below, if available at the time of this note:    CT HEAD WO CONTRAST   Preliminary Result   1. No evidence of acute intracranial abnormality with no significant change   in the appearance of the head CT when compared to the study of 12/06/2022.   2. Mild paranasal/maxillary sinus disease as described above. XR CHEST (2 VW)    Result Date: 12/5/2022  EXAMINATION: TWO XRAY VIEWS OF THE CHEST 12/5/2022 1:36 am COMPARISON: None. HISTORY: ORDERING SYSTEM PROVIDED HISTORY: Syncope, hypertension TECHNOLOGIST PROVIDED HISTORY: Reason for exam:->Syncope, hypertension Reason for Exam: Syncope, hypertension FINDINGS: Cardiac size at the upper limits of normal.  No pneumothorax. No acute osseous abnormality. No parenchymal infiltrate or pleural effusion. Multilevel degenerative changes are seen in the spine. No osseous destructive process. Clear chest without acute cardiopulmonary process. CT HEAD WO CONTRAST    Result Date: 12/26/2022  EXAMINATION: CT OF THE HEAD WITHOUT CONTRAST  12/26/2022 1:00 pm TECHNIQUE: CT of the head was performed without the administration of intravenous contrast. Automated exposure control, iterative reconstruction, and/or weight based adjustment of the mA/kV was utilized to reduce the radiation dose to as low as reasonably achievable. COMPARISON: Prior studies most recent 12/05/2022 and MRI of the brain 12/06/2022. HISTORY: ORDERING SYSTEM PROVIDED HISTORY: [facility] TECHNOLOGIST PROVIDED HISTORY: Reason for exam:->ams Has a \"code stroke\" or \"stroke alert\" been called? ->No Decision Support Exception - unselect if not a suspected or confirmed emergency medical condition->Emergency Medical Condition (MA) Reason for Exam: ams FINDINGS: BRAIN/VENTRICLES: The ventricular system is normal in appearance. No evidence of mass effect or midline shift.   There is persistent finding of subtle area of abnormal low attenuation within subcortical white matter within the left frontal lobe (image 36), unchanged. No new area of abnormal attenuation of brain parenchyma is identified. No abnormal extra-axial fluid collection is identified. ORBITS: The visualized portion of the orbits demonstrate no acute abnormality. There is mild nonspecific dysconjugate gaze. SINUSES: 9 mm rounded sclerotic density in the left frontal sinus is again identified in likely represents small osteoma (image 19). Minimal mucosal thickening identified within few ethmoid air cells. There is mild thickening of portions of the walls of the maxillary sinuses which is nonspecific however could be on the basis of chronic sinus disease. There has been interval increase in degree of mucosal thickening within the maxillary sinuses, left more so than right with suggestion of development of air-fluid/mucus level within the left maxillary sinus. There is right sided septal spurring. SOFT TISSUES/SKULL:  No acute abnormality of the visualized skull or soft tissues. 1. No evidence of acute intracranial abnormality with no significant change in the appearance of the head CT when compared to the study of 12/06/2022. 2. Mild paranasal/maxillary sinus disease as described above. CT HEAD WO CONTRAST    Result Date: 12/5/2022  EXAMINATION: CT OF THE HEAD WITHOUT CONTRAST  12/5/2022 2:55 pm TECHNIQUE: CT of the head was performed without the administration of intravenous contrast. Automated exposure control, iterative reconstruction, and/or weight based adjustment of the mA/kV was utilized to reduce the radiation dose to as low as reasonably achievable. COMPARISON: 12/05/2022. HISTORY: ORDERING SYSTEM PROVIDED HISTORY: slurred speech TECHNOLOGIST PROVIDED HISTORY: Has a \"code stroke\" or \"stroke alert\" been called? ->No Reason for exam:->slurred speech Reason for Exam: new slurred speech per pt's wife. Initial evaluation.  FINDINGS: BRAIN/VENTRICLES: There is no acute intracranial hemorrhage, mass effect or midline shift. No abnormal extra-axial fluid collection. The gray-white differentiation is maintained without evidence of an acute infarct. There is no evidence of hydrocephalus. There are minimal areas of hypoattenuation in the periventricular and subcortical white matter, which is nonspecific, but may represent chronic microvasvular ischemic change. ORBITS: The visualized portion of the orbits demonstrate no acute abnormality. SINUSES: The visualized paranasal sinuses and mastoid air cells demonstrate no acute abnormality. SOFT TISSUES/SKULL:  No acute abnormality of the visualized skull or soft tissues. No acute intracranial abnormality. CT Head W/O Contrast    Result Date: 12/5/2022  EXAMINATION: CT OF THE HEAD WITHOUT CONTRAST  12/5/2022 1:36 am TECHNIQUE: CT of the head was performed without the administration of intravenous contrast. Automated exposure control, iterative reconstruction, and/or weight based adjustment of the mA/kV was utilized to reduce the radiation dose to as low as reasonably achievable. COMPARISON: None. HISTORY: ORDERING SYSTEM PROVIDED HISTORY: Hypertension, AMS, syncope TECHNOLOGIST PROVIDED HISTORY: Reason for exam:->Hypertension, AMS, syncope Has a \"code stroke\" or \"stroke alert\" been called? ->No Decision Support Exception - unselect if not a suspected or confirmed emergency medical condition->Emergency Medical Condition (MA) Reason for Exam: Hypertension, AMS, syncope FINDINGS: BRAIN/VENTRICLES: There is no acute intracranial hemorrhage, mass effect or midline shift. No abnormal extra-axial fluid collection. The gray-white differentiation is maintained without evidence of an acute infarct. There is no evidence of hydrocephalus. No wedge-shaped area of acute ischemia. No basilar cistern or sulcal effacement. Mild patchy white matter low attenuation. Intracranial atherosclerotic calcification.  ORBITS: The visualized portion of the orbits demonstrate no acute abnormality. SINUSES: Chronic paranasal sinus disease is identified. Mastoid air cells are well aerated. There is a small osteoma seen within the left frontal sinus. SOFT TISSUES/SKULL:  No acute abnormality of the visualized skull or soft tissues. 1. No acute intracranial hemorrhage or wedge-shaped area of ischemia. 2. Intracranial atherosclerosis with mild patchy white matter low attenuation suggestive of chronic microvascular ischemic change. 3. Chronic paranasal sinus disease. MRI BRAIN W CONTRAST    Result Date: 12/6/2022  EXAMINATION: MRI OF THE BRAIN WITH CONTRAST  12/6/2022 5:20 pm TECHNIQUE: Multiplanar multisequence MRI of the head/brain was performed with the administration of intravenous contrast. COMPARISON: MRI brain 12/06/2022 HISTORY: ORDERING SYSTEM PROVIDED HISTORY: white matter lesion differentiation TECHNOLOGIST PROVIDED HISTORY: Reason for exam:->white matter lesion differentiation Reason for Exam: white matter lesion differentiation FINDINGS: INTRACRANIAL STRUCTURES/VENTRICLES:  The T1 hypointense/T2 hyperintense lesion lesion left frontal centrum semiovale demonstrates minimal linear focus of enhancement likely corresponding to a an underlying vessel this corresponds to the site of gradient echo susceptibility artifact on GRE on prior exam.  Otherwise no definite abnormal enhancement to suggest active demyelination. ORBITS: No abnormal enhancement. SINUSES: Anterior nasal septal defect identified. Otherwise symmetric enhancement. BONES/SOFT TISSUES: 2 no 19 mm lesions identified     No abnormal enhancement. XR CHEST 1 VIEW    Result Date: 12/7/2022  EXAMINATION: ONE XRAY VIEW OF THE CHEST 12/7/2022 3:10 pm COMPARISON: 12/05/2022 HISTORY: ORDERING SYSTEM PROVIDED HISTORY: PNA? TECHNOLOGIST PROVIDED HISTORY: Reason for exam:->PNA?  Reason for Exam: Loss of Consciousness FINDINGS: The cardial-pericardial silhouette is unremarkable in appearance. The lungs are clear. No pneumothorax is found. No free air is seen. No acute bony abnormality. No acute abnormality identified. CT CHEST PULMONARY EMBOLISM W CONTRAST    Result Date: 12/5/2022  EXAMINATION: CTA OF THE CHEST 12/5/2022 9:24 am TECHNIQUE: CTA of the chest was performed after the administration of intravenous contrast.  Multiplanar reformatted images are provided for review. MIP images are provided for review. Automated exposure control, iterative reconstruction, and/or weight based adjustment of the mA/kV was utilized to reduce the radiation dose to as low as reasonably achievable. COMPARISON: None. HISTORY: ORDERING SYSTEM PROVIDED HISTORY: evaluate for PE TECHNOLOGIST PROVIDED HISTORY: Reason for exam:->evaluate for PE Reason for Exam: fu ct cap, syncope collapse, FINDINGS: Pulmonary Arteries: Pulmonary arteries are adequately opacified for evaluation. The central pulmonary arteries are patent. Multiple subsegmental pulmonary arteries demonstrate small filling defects compatible with small volume pulmonary embolism. There is no right ventricular strain noted. The main pulmonary artery is top normal in caliber. Mediastinum: Mild cardiomegaly. Normal size thoracic aorta. No mediastinal or hilar adenopathy noted. Lungs/pleura: Mild diffuse congestive changes in the lungs. Mild mosaic lung attenuation may reflect air trapping or regional perfusion differences. Upper Abdomen: No acute finding. Incomplete opacification of the kidneys due to contrast timing. Soft Tissues/Bones: Mild osteopenic changes and degenerative changes identified in the bony structures. 1. Acute low volume pulmonary embolism involving the subsegmental pulmonary arteries in bilateral lungs. The central pulmonary arteries are patent. There is no right ventricular strain. 2. Mild cardiomegaly with mild diffuse congestion in the lungs.  3. Mild mosaic lung attenuation may reflect air trapping or regional perfusion differences. Communications: The above critical/ time-sensitive findings were communicated personally by radiology communications center/Dr. Lalitha Mcmahan to Dr. Dereck Harding and nurse Bree Gilmore at 10:10 a.m. 12/05/2022. VL Extremity Venous Right    Result Date: 12/6/2022  Lower Extremities DVT Study  Demographics   Patient Name       Brandon Diaz   Date of Study      12/06/2022     Gender                 Male   Patient Number     [id number]     Date of Birth          1965   Visit Number       [id number]      Age                    59 year(s)   Accession Number   [id number]     Room Number            0917   Corporate ID       [id number]        Alda Reyes RVT   Ordering Physician Venkat Mendosa  Interpreting Physician [facility]                                                           Richar De La Cruz MD  Procedure Type of Study:   Veins:Lower Extremities DVT Study, VL EXTREMITY VENOUS DUPLEX RIGHT. Vascular Sonographer Report  Additional Indications:PE Impressions Right Impression Acute deep vein thrombosis involving the right mid to distal femoral vein. Thrombus is noted at the site of the fistula and just distal to it. 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 Pulsatile venous flow is noted in the thigh veins. Left Impression No evidence of venous thrombus noted in the left common femoral vein. Conclusions   Summary   Acute deep vein thrombosis involving the right mid to distal femoral vein. Thrombus is noted at the site of the fistula and just distal to it. 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    Signature   ------------------------------------------------------------------  Electronically signed by Richar De La Cruz MD (Interpreting  physician) on 12/06/2022 at 12:33 PM ------------------------------------------------------------------  Patient Status:Routine. Study Raghu  - Vascular Lab. Technical Quality:Adequate visualization. Risk Factors   - The patient's risk factor(s) include: dyslipidemia, obesity, lack of     physical activity and treated arterial hypertension.   - The patient has a current/recent (within 1 year) tobacco history. Velocities are measured in cm/s ; Diameters are measured in mm Right Lower Extremities DVT Study Measurements Right 2D Measurements +------------------------+----------+---------------+----------+ ! Location                ! Visualized! Compressibility! Thrombosis! +------------------------+----------+---------------+----------+ ! Sapheno Femoral Junction! Yes       ! Yes            ! None      ! +------------------------+----------+---------------+----------+ ! GSV Thigh               ! Yes       ! Yes            ! None      ! +------------------------+----------+---------------+----------+ ! Common Femoral          !Yes       ! Yes            ! None      ! +------------------------+----------+---------------+----------+ ! Prox Femoral            !Yes       ! Yes            ! None      ! +------------------------+----------+---------------+----------+ ! Mid Femoral             !Yes       ! Partial        !Acute     ! +------------------------+----------+---------------+----------+ ! Dist Femoral            !Yes       ! No             !Acute     ! +------------------------+----------+---------------+----------+ ! Deep Femoral            !Yes       ! Yes            ! None      ! +------------------------+----------+---------------+----------+ ! Popliteal               !Yes       ! Yes            ! None      ! +------------------------+----------+---------------+----------+ ! GSV Below Knee          ! Yes       ! Yes            ! None      ! +------------------------+----------+---------------+----------+ ! Gastroc                 ! Yes       ! Yes !None      ! +------------------------+----------+---------------+----------+ ! Soleal                  !Yes       ! Yes            ! None      ! +------------------------+----------+---------------+----------+ ! PTV                     ! Yes       ! Yes            ! None      ! +------------------------+----------+---------------+----------+ ! ATV                     ! Yes       ! Yes            ! None      ! +------------------------+----------+---------------+----------+ ! Peroneal                !Yes       ! Yes            ! None      ! +------------------------+----------+---------------+----------+ ! GSV Calf                ! Yes       ! Yes            ! None      ! +------------------------+----------+---------------+----------+ ! SSV                     ! Yes       ! Yes            ! None      ! +------------------------+----------+---------------+----------+ Right Doppler Measurements +--------------+---------+------+------------+ ! Location      ! Signal   !Reflux! Reflux (sec)! +--------------+---------+------+------------+ ! Common Femoral!Pulsatile!      !            ! +--------------+---------+------+------------+ ! Mid Femoral   !Pulsatile!      !            ! +--------------+---------+------+------------+ ! Popliteal     !Pulsatile!      !            ! +--------------+---------+------+------------+ Left Lower Extremities DVT Study Measurements Left 2D Measurements +--------------+----------+---------------+----------+ ! Location      ! Visualized! Compressibility! Thrombosis! +--------------+----------+---------------+----------+ ! Common Femoral!Yes       ! Yes            ! None      ! +--------------+----------+---------------+----------+ Left Doppler Measurements +--------------+------+------+------------+ ! Location      ! Signal!Reflux! Reflux (sec)! +--------------+------+------+------------+ ! Common Femoral!Phasic!      !            ! +--------------+------+------+------------+    CT CHEST ABDOMEN PELVIS W CONTRAST Additional Contrast? None    Addendum Date: 12/19/2022    ADDENDUM: By report an outside echo showed a large liver cyst.  A large liver cyst is not clearly identified on this exam.     Result Date: 12/19/2022  EXAMINATION: CT OF THE CHEST, ABDOMEN, AND PELVIS WITH CONTRAST 12/5/2022 5:02 am TECHNIQUE: CT of the chest, abdomen and pelvis was performed with the administration of intravenous contrast. Multiplanar reformatted images are provided for review. Automated exposure control, iterative reconstruction, and/or weight based adjustment of the mA/kV was utilized to reduce the radiation dose to as low as reasonably achievable. COMPARISON: None HISTORY: ORDERING SYSTEM PROVIDED HISTORY: syncope, weight loss? TECHNOLOGIST PROVIDED HISTORY: Reason for exam:->syncope, weight loss? Additional Contrast?->None Reason for Exam: syncope, weight loss? FINDINGS: Chest: Mediastinum: Thyroid gland is unremarkable. No pericardial effusion. No pericardial calcification noted. Small hiatal hernia seen. There is nonspecific thickening at the GE junction. No significant mediastinal or hilar adenopathy is seen. Trace coronary artery calcification is seen. There is a questionable filling defect seen in right upper lobe pulmonary artery. However this finding could be artifactual from streak artifact from contrast bolus in the SVC and the timing the study Lungs/pleura: Respiratory motion artifact limits evaluation of fine pulmonary parenchymal change. No obstructing endobronchial lesions are seen. Secretions are seen in the airways. On the right, a few scattered ground-glass opacities are seen in the right upper lobe. There is scarring in the right lower lobe adjacent to prominent osteophyte with a few scattered ground-glass opacity seen in the right lower lobe On the left, a few scattered ground-glass opacities are seen in the left upper lobe and left lower lobe.   No pleural effusions noted on the right or left There is a more focal 5 mm noncalcified nodule in the right lower lobe Soft Tissues/Bones: Spurring is seen in the spine. Spurring is seen in the shoulder joint. Abdomen/Pelvis: Organs: No splenomegaly. No perisplenic fluid. Right adrenal gland is normal.  Left adrenal gland is normal.  No hydronephrosis on right. No hydronephrosis on left. Hypodense nodule projects off the right kidney posteriorly, measuring 11 mm, likely cyst.  No peripancreatic fluid. No peripancreatic inflammatory change. No intrahepatic ductal dilatation. No perihepatic fluid. No inflammatory stranding surrounding the gallbladder. .  There is subtle lobular contour to the liver. No peripancreatic fluid. No peripancreatic inflammatory change GI/Bowel: No significant small bowel distention noted. Moderate stool is seen in the colon. No bowel obstruction. No significant small or large bowel thickening. Pelvis: No free fluid in pelvis. No pelvic adenopathy. No abnormal bladder wall thickening noted Peritoneum/Retroperitoneum: Atherosclerotic change seen in aorta. No aneurysm. Scattered small retroperitoneal nodes are seen, incidentally noted. Bones/Soft Tissues: Spurring is seen the spine. Spurring is seen in the hips     Chest There is a questionable filling defect seen in right upper lobe pulmonary artery. However this finding could be artifactual from streak artifact from contrast bolus in the SVC and the timing the study rather than a true tiny pulmonary embolus. .  Correlate for any clinical risk factors of pulmonary embolism. Consider CT PA for further evaluation of this finding to see if it persists. A few scattered ground-glass opacities are seen throughout the lungs. Ground-glass opacities are nonspecific can be due to atelectasis, postinflammatory-infectious change or early edema.  5 mm noncalcified pulmonary nodule right lower lobe Abdomen pelvis: No acute intra-abdominal abnormality The findings were sent to the Radiology Results [address] at 6:03 am on 12/5/2022 to be communicated to a licensed caregiver. RECOMMENDATIONS: 5 mm right solid pulmonary nodule. No routine follow-up imaging is recommended per Fleischner Society Guidelines. These guidelines do not apply to immunocompromised patients and patients with cancer. Follow up in patients with significant comorbidities as clinically warranted. For lung cancer screening, adhere to Lung-RADS guidelines. Reference: Radiology. 2017; 284(1):228-43. MRI BRAIN WO CONTRAST    Result Date: 12/6/2022  EXAMINATION: MRI OF THE BRAIN WITHOUT CONTRAST  12/6/2022 2:48 pm TECHNIQUE: Multiplanar multisequence MRI of the brain was performed without the administration of intravenous contrast. COMPARISON: None. HISTORY: ORDERING SYSTEM PROVIDED HISTORY: dysarthria, r/o stroke TECHNOLOGIST PROVIDED HISTORY: Reason for exam:->dysarthria, r/o stroke Reason for Exam: dysarthria, r/o stroke FINDINGS: INTRACRANIAL STRUCTURES/VENTRICLES: There is no acute infarct. No mass effect or midline shift. No evidence of an acute intracranial hemorrhage. The ventricles and sulci are normal in size and configuration. The sellar/suprasellar regions appear unremarkable. The normal signal voids within the major intracranial vessels appear maintained. Minimal chronic white matter microvascular ischemic changes are present. There is a rounded focus of signal abnormality within the anterior left centrum semiovale measuring 13 mm in diameter, with punctate foci of susceptibility artifact seen centrally on GRE. ORBITS: The visualized portion of the orbits demonstrate no acute abnormality. SINUSES: Mild bilateral mastoid effusions are present. BONES/SOFT TISSUES: The bone marrow signal intensity appears normal. The soft tissues demonstrate no acute abnormality. No acute infarct. Nonspecific focus of rounded signal abnormality within the subcortical matter white matter of the left frontal lobe.  Suggest a repeat study with IV contrast for further evaluation. NM Cardiac Stress Test Nuclear Imaging    Result Date: 12/6/2022  Cardiac Perfusion Imaging  Demographics   Patient Name       Randine Carrel   Date of Study      12/06/2022         Gender              Male   Patient Number     [id number]         Date of Birth       1965   Visit Number       [id number]          Age                 59 year(s)   Accession Number   [id number]         Room Number         1712   Corporate ID       [id number]            NM Technician       Елена Montalvo, RT   Nurse              Barby Woody,   Interpreting        [facility].                     RN                 Physician           Tray Eller MD   Ordering Physician Sammy Sewell,  Stress Interpreting Gui Echevarria MD                 Physician           Star Edwards MD   The procedure was explained in detail to the patient. Risks,  complications and alternative treatments were reviewed. Written consent  was obtained. Procedure Procedure Type:   Nuclear Stress Test:NM MYOCARDIAL SPECT REST EXERCISE OR RX   Study location: [place] [address]. Nuclear Medicine   Indications: Chest pain and abnormal rest ECG. Hospital Status: Inpatient. Height: 67 inches Weight: 194 pounds  Risk Factors   The patient risk factors include:obesity, physical activity,  Current/Recent(w/in 1 year) tobacco use, treated hypercholesterolemia,  treated hypertension and dyslipidemia. Conclusions   Summary  Normal myocardial perfusion study. Normal LV size and systolic function. Non-diagnostic EKG response due to failure to reach target heart rate. Overall findings represent a low risk study. Stress Protocols   Resting ECG  Sinus rhythm with inferior and lateral T  wave changes suggestive of ischemia. Resting HR:54 bpm  Resting BP:147/104 mmHg   Pre-stress physical exam: No complaints of  chest pain.  Heart and lung sounds  unremarkable. 02 saturation 98% on room  air. Adult plus BP cuff used. Troponin T  0.09, 0.09, and 0.09 (Dr. Ol[facility] aware). To  proceed with Lexiscan Myoview stress test.  Stress Protocol:Pharmacologic - Lexiscan's  Peak HR:94 bpm                             HR/BP product:[id number]  Peak BP:184/88 mmHg  Predicted HR: 161 bpm  % of predicted HR: 58  Test duration: 1 min and 40 sec  Reason for termination:Completed   Arrhythmias  No significant rhythm abnormality. Symptoms  Lexiscan 0.4 mg IV given followed by brief shortness of breath. 02  saturation 100% on room air. No complaints of chest pain. Complications  Procedure complication was none. Stress Interpretation  Non-diagnostic EKG response due to failure to reach target heart rate. Procedure Medications   - Lexiscan I.V. 0.4 mg.10 sec  Imaging Protocols   - One Day   Rest                       Stress   Isotope:Myoview            Isotope: Myoview  Isotope dose:11.4 mCi      Isotope dose:30.8 mCi  Administration Route:I.V.   Administration Route:I.V.  Date:12/06/2022 00:00      Date:12/06/2022 00:00                              Technique:     Gated  Imaging Results    Summed scores     - Summed stress score: 0     - Summed rest score: 1     - Summed difference score:    0     Stress ejection    Ejection fraction:57 %    EDV :141 ml    ESV :61 ml    Stroke volume :80 ml  Medical History   Additional Medical History   DVT & PE (12/6/2022), Syncope   Signatures   ------------------------------------------------------------------  Electronically signed by Mira Herron MD  (Interpreting physician) on 12/06/2022 at 13:05  ------------------------------------------------------------------      CTA LOWER EXTREMITY RIGHT W CONTRAST    Result Date: 12/6/2022  EXAMINATION: CTA OF THE RIGHT LOWER EXTREMITY 12/6/2022 5:13 pm TECHNIQUE: CTA of the right lower extremity was performed after the administration of intravenous contrast. Multiplanar reformatted images are provided for review. MIP images are provided for review. . Automated exposure control, iterative reconstruction, and/or weight based adjustment of the mA/kV was utilized to reduce the radiation dose to as low as reasonably achievable. COMPARISON: None HISTORY ORDERING SYSTEM PROVIDED HISTORY: arteriovenous fistula TECHNOLOGIST PROVIDED HISTORY: Reason for exam:->arteriovenous fistula Reason for Exam: arteriovenous fistula FINDINGS: The right common iliac, internal and external iliac arteries appear normal. Right common femoral, profunda femoris and superficial femoral arteries appear unremarkable. The right popliteal artery is intact. The trifurcation vessels appear unremarkable. No evidence of arterial occlusion. A large hematoma can be seen in the right thigh however, no evidence of active extravasation to suggest active bleeding. There is no evidence of AV fistula. 1. Large right thigh hematoma but no evidence of active bleeding or pseudoaneurysm. No evidence of AV fistula. 2. Unremarkable arterial circulation of the right lower extremity. No evidence of arterial aneurysm, dissection or occlusion. No results found. MEDICAL DECISION MAKING / ED COURSE:      PROCEDURES:   Procedures    None    Patient was given:  Medications - No data to display  Emergency room course: Patient on exam throat is clear. Nonerythematous no exudate. Pupils are equal round and reactive to light extraocular movement is intact. Pupils are not pinpoint. Neck is supple full range of motion without midline tenderness. No midline tender cervical, thoracic or lumbar spine. Cardiovascular regular rate rhythm, lungs are clear no wheeze rales or rhonchi noted. Abdomen soft nondistended. Normal bowel sounds all 4 quadrant. No CVA or flank tenderness. Bilateral lower extremities show no edema has good strength against resisted plantar dorsiflexion.   Negative pronator drift upper and lower extremity  strength 5+ equal bilaterally. He has no facial drooping noted. Tongue and smile does not deviate. Speech is normal.  Alert oriented x4. He is ambulatory with no difficulty. Watching the patient as I was sitting across from him in room 27 every time he goes to sleep or put his head down he started dozing off his oxygen level did drop to 87% or in the upper 80s. And when you wake him up to have him take deep breath he goes back up in the 90s. And as I was watching a he does doze off pretty easily. Go back and arouse the patient he wakes right up have no problem. He denies any drug use. I asked him several times as he taken any medication any drugs he says no. Lab result from today shows CBC with a white count of 6.4, RBC 2.90, hemoglobin 8.9, hematocrit 27.6. BMP shows sodium 132, potassium 4.1, chloride 100 with a BUN of 22 creatinine 1.0. Troponin 0.02    Urinalysis shows negative leukocytes, negative for nitrites, negative for blood, negative for ketones. Urine drug screen shows positive for fentanyl. Discussed patient urine drug screen with him and his wife who was on the phone he said it was okay for her to listen. She was very upset because he kept saying that he did not take anything. He still states he did not take anything. I asked him if he smoked weed and he says yes but there was no marijuana in his system. I informed him that sometime people do laced her marijuana with fentanyl to get him hooked on it. After watching patient for several hours he remained alert. He remained awake. O2 saturation remained above 95%. At this point patient will be discharged. Instructed follow-up with his primary care provider. And asked him about getting help with [facility] and wife said he already have the information. Patient will be discharged in stable condition. The patient tolerated their visit well. I evaluated the patient.   The physician was available for consultation as needed. The patient and / or the family were informed of the results of any tests, a time was given to answer questions, a plan was proposed and they agreed with plan. I am the Primary Clinician of Record. CLINICAL IMPRESSION:  1.  Fentanyl use disorder, mild, abuse ([facility] [address]        DISPOSITION Decision To Discharge 12/26/2022 06:44:29 PM      PATIENT REFERRED TO:  Mia Ulloa [address] [place] [address]  [phone]    In 1 day      DISCHARGE MEDICATIONS:  New Prescriptions    No medications on file       DISCONTINUED MEDICATIONS:  Discontinued Medications    No medications on file              (Please note the MDM and HPI sections of this note were completed with a voice recognition program.  Efforts were made to edit the dictations but occasionally words are mis-transcribed.)    Electronically signed, Ranulfo Andrade PA-C,          Ranulfo Andrade PA-C  12/28/22 1042

## 2022-12-27 ENCOUNTER — TELEPHONE (OUTPATIENT)
Dept: FAMILY MEDICINE CLINIC | Age: 57
End: 2022-12-27

## 2022-12-27 NOTE — TELEPHONE ENCOUNTER
Pt was in the hospital again and they found Fentanyl in his system and only Fentanyl this time. Pt is needing to make a hospital f/u but nothing is available. Pt is scheduled for a check up on 1.4.2023 but the appt is for only 15 mins. Pt's wife would like to know if he could do the check up and hospital f/u at the same time, I told her that we dont have enough allotment time. Pt's wife really thinks that he needs to be in a rehab for 30 days.      Pl advise   [phone]

## 2022-12-27 NOTE — TELEPHONE ENCOUNTER
I was on call and received call ~12:30 am today abut her  not feeling well  He had been in ER  Was not listening to her  Not acting appropriate  I advised to go to the ER or call [phone]

## 2022-12-28 ASSESSMENT — ENCOUNTER SYMPTOMS
EYE PAIN: 0
BACK PAIN: 0
ABDOMINAL PAIN: 0
COUGH: 0
SHORTNESS OF BREATH: 0
NAUSEA: 0
VOMITING: 0
SORE THROAT: 0

## 2023-01-03 RX ORDER — METOPROLOL SUCCINATE 100 MG/1
TABLET, EXTENDED RELEASE ORAL
Qty: 30 TABLET | OUTPATIENT
Start: 2023-01-03

## 2023-01-03 NOTE — TELEPHONE ENCOUNTER
Metoprolol was d/c on 12/5/22 from Dr Dalton Mitchell MD at a hospital visit r/t pt had bradycardia and right leg PE.    Pt has an o/v on 1/4/23

## 2023-01-04 ENCOUNTER — OFFICE VISIT (OUTPATIENT)
Dept: FAMILY MEDICINE CLINIC | Age: 58
End: 2023-01-04
Payer: COMMERCIAL

## 2023-01-04 VITALS
HEIGHT: 67 IN | BODY MASS INDEX: 27.94 KG/M2 | HEART RATE: 94 BPM | DIASTOLIC BLOOD PRESSURE: 73 MMHG | SYSTOLIC BLOOD PRESSURE: 134 MMHG | WEIGHT: 178 LBS | TEMPERATURE: 98.4 F | OXYGEN SATURATION: 98 %

## 2023-01-04 DIAGNOSIS — D64.9 ANEMIA, UNSPECIFIED TYPE: ICD-10-CM

## 2023-01-04 DIAGNOSIS — S80.11XD LEG HEMATOMA, RIGHT, SUBSEQUENT ENCOUNTER: ICD-10-CM

## 2023-01-04 DIAGNOSIS — I82.411 ACUTE DEEP VEIN THROMBOSIS (DVT) OF FEMORAL VEIN OF RIGHT LOWER EXTREMITY (HCC): ICD-10-CM

## 2023-01-04 DIAGNOSIS — F33.1 MODERATE EPISODE OF RECURRENT MAJOR DEPRESSIVE DISORDER (HCC): ICD-10-CM

## 2023-01-04 DIAGNOSIS — I26.99 MULTIPLE PULMONARY EMBOLI (HCC): ICD-10-CM

## 2023-01-04 DIAGNOSIS — F19.10 DRUG ABUSE (HCC): Primary | ICD-10-CM

## 2023-01-04 DIAGNOSIS — E78.00 PURE HYPERCHOLESTEROLEMIA: ICD-10-CM

## 2023-01-04 PROBLEM — R77.8 ELEVATED TROPONIN: Status: RESOLVED | Noted: 2022-12-05 | Resolved: 2023-01-04

## 2023-01-04 PROBLEM — R79.89 ELEVATED TROPONIN: Status: RESOLVED | Noted: 2022-12-05 | Resolved: 2023-01-04

## 2023-01-04 PROCEDURE — G8417 CALC BMI ABV UP PARAM F/U: HCPCS | Performed by: INTERNAL MEDICINE

## 2023-01-04 PROCEDURE — 99214 OFFICE O/P EST MOD 30 MIN: CPT | Performed by: INTERNAL MEDICINE

## 2023-01-04 PROCEDURE — 3017F COLORECTAL CA SCREEN DOC REV: CPT | Performed by: INTERNAL MEDICINE

## 2023-01-04 PROCEDURE — 4004F PT TOBACCO SCREEN RCVD TLK: CPT | Performed by: INTERNAL MEDICINE

## 2023-01-04 PROCEDURE — G8484 FLU IMMUNIZE NO ADMIN: HCPCS | Performed by: INTERNAL MEDICINE

## 2023-01-04 PROCEDURE — 3074F SYST BP LT 130 MM HG: CPT | Performed by: INTERNAL MEDICINE

## 2023-01-04 PROCEDURE — G8427 DOCREV CUR MEDS BY ELIG CLIN: HCPCS | Performed by: INTERNAL MEDICINE

## 2023-01-04 PROCEDURE — 3078F DIAST BP <80 MM HG: CPT | Performed by: INTERNAL MEDICINE

## 2023-01-04 PROCEDURE — 1111F DSCHRG MED/CURRENT MED MERGE: CPT | Performed by: INTERNAL MEDICINE

## 2023-01-04 RX ORDER — HYDROXYZINE HYDROCHLORIDE 25 MG/1
25 TABLET, FILM COATED ORAL 2 TIMES DAILY PRN
Qty: 30 TABLET | Refills: 0 | Status: SHIPPED | OUTPATIENT
Start: 2023-01-04 | End: 2023-01-19

## 2023-01-04 RX ORDER — ATORVASTATIN CALCIUM 20 MG/1
20 TABLET, FILM COATED ORAL DAILY
Qty: 90 TABLET | Refills: 1 | Status: SHIPPED | OUTPATIENT
Start: 2023-01-04

## 2023-01-04 RX ORDER — VENLAFAXINE HYDROCHLORIDE 75 MG/1
150 CAPSULE, EXTENDED RELEASE ORAL DAILY
Qty: 30 CAPSULE | Refills: 3 | Status: SHIPPED | OUTPATIENT
Start: 2023-01-04

## 2023-01-04 RX ORDER — LISINOPRIL 10 MG/1
10 TABLET ORAL DAILY
Qty: 90 TABLET | Refills: 1 | Status: SHIPPED | OUTPATIENT
Start: 2023-01-04

## 2023-01-04 ASSESSMENT — PATIENT HEALTH QUESTIONNAIRE - PHQ9
SUM OF ALL RESPONSES TO PHQ QUESTIONS 1-9: 1
5. POOR APPETITE OR OVEREATING: 0
7. TROUBLE CONCENTRATING ON THINGS, SUCH AS READING THE NEWSPAPER OR WATCHING TELEVISION: 0
1. LITTLE INTEREST OR PLEASURE IN DOING THINGS: 0
3. TROUBLE FALLING OR STAYING ASLEEP: 0
6. FEELING BAD ABOUT YOURSELF - OR THAT YOU ARE A FAILURE OR HAVE LET YOURSELF OR YOUR FAMILY DOWN: 0
4. FEELING TIRED OR HAVING LITTLE ENERGY: 0
SUM OF ALL RESPONSES TO PHQ9 QUESTIONS 1 & 2: 1
10. IF YOU CHECKED OFF ANY PROBLEMS, HOW DIFFICULT HAVE THESE PROBLEMS MADE IT FOR YOU TO DO YOUR WORK, TAKE CARE OF THINGS AT HOME, OR GET ALONG WITH OTHER PEOPLE: 0
2. FEELING DOWN, DEPRESSED OR HOPELESS: 1
SUM OF ALL RESPONSES TO PHQ QUESTIONS 1-9: 1
9. THOUGHTS THAT YOU WOULD BE BETTER OFF DEAD, OR OF HURTING YOURSELF: 0
8. MOVING OR SPEAKING SO SLOWLY THAT OTHER PEOPLE COULD HAVE NOTICED. OR THE OPPOSITE, BEING SO FIGETY OR RESTLESS THAT YOU HAVE BEEN MOVING AROUND A LOT MORE THAN USUAL: 0

## 2023-01-04 NOTE — LETTER
[employer] [facility]  [address] [facility] [address]  Phone: [phone]  Fax: [phone]    Xavier August MD        January 4, 2023     Patient: Keon Garcia   YOB: 1965   Date of Visit: 1/4/2023       To Whom It May Concern: It is my medical opinion that Dane Negrete has been hospitalized for an acute DVT and PE. He may return to work without restrictions on January 9th  If you have any questions or concerns, please don't hesitate to call.     Sincerely,        Xavier August MD
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
## 2023-01-08 ASSESSMENT — ENCOUNTER SYMPTOMS
SHORTNESS OF BREATH: 0
BLOOD IN STOOL: 0
DIARRHEA: 0
ANAL BLEEDING: 0
COUGH: 0
VOMITING: 0
NAUSEA: 0

## 2023-01-09 PROBLEM — J10.1 INFLUENZA A: Status: RESOLVED | Noted: 2022-12-10 | Resolved: 2023-01-09

## 2023-01-30 ENCOUNTER — TELEPHONE (OUTPATIENT)
Dept: FAMILY MEDICINE CLINIC | Age: 58
End: 2023-01-30

## 2023-01-30 DIAGNOSIS — D64.9 ANEMIA, UNSPECIFIED TYPE: ICD-10-CM

## 2023-01-30 LAB
BASOPHILS ABSOLUTE: 0 K/UL (ref 0–0.2)
BASOPHILS RELATIVE PERCENT: 0.5 %
EOSINOPHILS ABSOLUTE: 0.1 K/UL (ref 0–0.6)
EOSINOPHILS RELATIVE PERCENT: 1 %
HCT VFR BLD CALC: 39.8 % (ref 40.5–52.5)
HEMOGLOBIN: 12.8 G/DL (ref 13.5–17.5)
LYMPHOCYTES ABSOLUTE: 1.6 K/UL (ref 1–5.1)
LYMPHOCYTES RELATIVE PERCENT: 20.7 %
MCH RBC QN AUTO: 30.4 PG (ref 26–34)
MCHC RBC AUTO-ENTMCNC: 32.1 G/DL (ref 31–36)
MCV RBC AUTO: 94.8 FL (ref 80–100)
MONOCYTES ABSOLUTE: 0.5 K/UL (ref 0–1.3)
MONOCYTES RELATIVE PERCENT: 6 %
NEUTROPHILS ABSOLUTE: 5.6 K/UL (ref 1.7–7.7)
NEUTROPHILS RELATIVE PERCENT: 71.8 %
PDW BLD-RTO: 17.7 % (ref 12.4–15.4)
PLATELET # BLD: 300 K/UL (ref 135–450)
PMV BLD AUTO: 7.9 FL (ref 5–10.5)
RBC # BLD: 4.2 M/UL (ref 4.2–5.9)
WBC # BLD: 7.8 K/UL (ref 4–11)

## 2023-01-30 NOTE — TELEPHONE ENCOUNTER
Prescription sent  Unfortunately I do not know how long the prior authorization takes  We will do it today if needed but do not know how long it takes to go through

## 2023-01-30 NOTE — TELEPHONE ENCOUNTER
----- Message from Gertrudis Shah. Melissa Nation sent at 1/29/2023 11:53 AM EST -----  Regarding: Xarelto 20mg  I will be out of this after monday. It wont let me do a refill request on the Novavax AB emmie. I am currently using [facility] at the [facility] location. [phone]. I think this might need pre approval through the insurance. Leandro Owen   i did enroll in the [address] [place] [address] savings program..  Thanks  Marcos Hernandez

## 2023-01-30 NOTE — TELEPHONE ENCOUNTER
Called [facility], Xarelto 20 mg one daily is going thru for $25.  Spoke with the pt informed him xarelto is going thru for $25, pt said he activated the copay card to get it for $10, I told him to give it to them at the pharmacy and hopefully it'll only cost him $10.

## 2023-02-01 ENCOUNTER — OFFICE VISIT (OUTPATIENT)
Dept: FAMILY MEDICINE CLINIC | Age: 58
End: 2023-02-01
Payer: COMMERCIAL

## 2023-02-01 VITALS
SYSTOLIC BLOOD PRESSURE: 131 MMHG | OXYGEN SATURATION: 96 % | WEIGHT: 186 LBS | HEART RATE: 72 BPM | TEMPERATURE: 98.2 F | BODY MASS INDEX: 29.19 KG/M2 | HEIGHT: 67 IN | DIASTOLIC BLOOD PRESSURE: 69 MMHG

## 2023-02-01 DIAGNOSIS — I82.411 ACUTE DEEP VEIN THROMBOSIS (DVT) OF FEMORAL VEIN OF RIGHT LOWER EXTREMITY (HCC): ICD-10-CM

## 2023-02-01 DIAGNOSIS — M25.561 CHRONIC PAIN OF BOTH KNEES: Primary | ICD-10-CM

## 2023-02-01 DIAGNOSIS — F19.10 DRUG ABUSE (HCC): ICD-10-CM

## 2023-02-01 DIAGNOSIS — M25.562 CHRONIC PAIN OF BOTH KNEES: Primary | ICD-10-CM

## 2023-02-01 DIAGNOSIS — G56.03 BILATERAL CARPAL TUNNEL SYNDROME: ICD-10-CM

## 2023-02-01 DIAGNOSIS — G89.29 CHRONIC PAIN OF BOTH KNEES: Primary | ICD-10-CM

## 2023-02-01 DIAGNOSIS — I26.99 ACUTE PULMONARY EMBOLISM WITHOUT ACUTE COR PULMONALE, UNSPECIFIED PULMONARY EMBOLISM TYPE (HCC): ICD-10-CM

## 2023-02-01 PROCEDURE — 4004F PT TOBACCO SCREEN RCVD TLK: CPT | Performed by: INTERNAL MEDICINE

## 2023-02-01 PROCEDURE — G8417 CALC BMI ABV UP PARAM F/U: HCPCS | Performed by: INTERNAL MEDICINE

## 2023-02-01 PROCEDURE — G8427 DOCREV CUR MEDS BY ELIG CLIN: HCPCS | Performed by: INTERNAL MEDICINE

## 2023-02-01 PROCEDURE — G8484 FLU IMMUNIZE NO ADMIN: HCPCS | Performed by: INTERNAL MEDICINE

## 2023-02-01 PROCEDURE — 3078F DIAST BP <80 MM HG: CPT | Performed by: INTERNAL MEDICINE

## 2023-02-01 PROCEDURE — 3017F COLORECTAL CA SCREEN DOC REV: CPT | Performed by: INTERNAL MEDICINE

## 2023-02-01 PROCEDURE — 3074F SYST BP LT 130 MM HG: CPT | Performed by: INTERNAL MEDICINE

## 2023-02-01 PROCEDURE — 99213 OFFICE O/P EST LOW 20 MIN: CPT | Performed by: INTERNAL MEDICINE

## 2023-02-01 RX ORDER — GABAPENTIN 300 MG/1
300 CAPSULE ORAL 2 TIMES DAILY
Qty: 60 CAPSULE | Refills: 1 | Status: SHIPPED | OUTPATIENT
Start: 2023-02-01 | End: 2023-04-02

## 2023-02-01 SDOH — ECONOMIC STABILITY: FOOD INSECURITY: WITHIN THE PAST 12 MONTHS, YOU WORRIED THAT YOUR FOOD WOULD RUN OUT BEFORE YOU GOT MONEY TO BUY MORE.: NEVER TRUE

## 2023-02-01 SDOH — ECONOMIC STABILITY: HOUSING INSECURITY
IN THE LAST 12 MONTHS, WAS THERE A TIME WHEN YOU DID NOT HAVE A STEADY PLACE TO SLEEP OR SLEPT IN A SHELTER (INCLUDING NOW)?: NO

## 2023-02-01 SDOH — ECONOMIC STABILITY: FOOD INSECURITY: WITHIN THE PAST 12 MONTHS, THE FOOD YOU BOUGHT JUST DIDN'T LAST AND YOU DIDN'T HAVE MONEY TO GET MORE.: NEVER TRUE

## 2023-02-01 SDOH — ECONOMIC STABILITY: INCOME INSECURITY: HOW HARD IS IT FOR YOU TO PAY FOR THE VERY BASICS LIKE FOOD, HOUSING, MEDICAL CARE, AND HEATING?: NOT HARD AT ALL

## 2023-02-01 NOTE — PROGRESS NOTES
SUBJECTIVE:  Markos Cordero is a 59 y.o. male being evaluated for:    Chief Complaint   Patient presents with    Follow-up      Patient is here for 4 weeks followup today . Shoulder Pain      Patient is having pain on the right shoulder . HPI   Patient with increasing pain in his knees  Pain in his right shoulder  pain remote with soft ball using Biofreeze and icy hot and helping  Using tylenol    Knees hurt with walking  Walks a lot 7 miles a day at work   severe arthritis in his right knee bone on bone and left knee with replacement   Hurting  Has had shots in his knee and do help short term  and last for a couple of months Cannot go through knee replacement      When get home from work  Knees hot  The more he walks the more he is in pain   Antiinflammatories did not help     Fingertips numb hands go numb    No Known Allergies  Current Outpatient Medications   Medication Sig Dispense Refill    gabapentin (NEURONTIN) 300 MG capsule Take 1 capsule by mouth 2 times daily for 60 days.  Max Daily Amount: 600 mg 60 capsule 1    rivaroxaban (XARELTO) 20 MG TABS tablet Take 1 tablet by mouth daily (with breakfast) 30 tablet 5    venlafaxine (EFFEXOR XR) 75 MG extended release capsule Take 2 capsules by mouth daily 30 capsule 3    lisinopril (PRINIVIL;ZESTRIL) 10 MG tablet Take 1 tablet by mouth daily 90 tablet 1    atorvastatin (LIPITOR) 20 MG tablet Take 1 tablet by mouth daily 90 tablet 1    dicyclomine (BENTYL) 10 MG capsule TAKE ONE CAPSULE BY MOUTH THREE TIMES A DAY AS NEEDED FOR ABDOMINAL BLOATING 90 capsule 3    venlafaxine (EFFEXOR XR) 150 MG extended release capsule TAKE ONE CAPSULE BY MOUTH DAILY (Patient taking differently: Take 225 mg by mouth daily) 90 capsule 1    Biotin 1 MG CAPS Take by mouth      B Complex Vitamins (VITAMIN B COMPLEX PO) Take 1 tablet by mouth daily      Glucosamine-Chondroitin (GLUCOSAMINE CHONDR COMPLEX PO) Take by mouth Take 2-3 by mouth every morning       No current facility-administered medications for this visit. Social History     Socioeconomic History    Marital status:      Spouse name: Not on file    Number of children: Not on file    Years of education: Not on file    Highest education level: Not on file   Occupational History    Not on file   Tobacco Use    Smoking status: Every Day     Packs/day: 0.50     Types: Cigarettes     Start date: 0     Last attempt to quit: 12/15/2021     Years since quittin.1    Smokeless tobacco: Never   Vaping Use    Vaping Use: Former   Substance and Sexual Activity    Alcohol use: Yes     Alcohol/week: 6.0 standard drinks     Types: 3 Cans of beer, 3 Shots of liquor per week     Comment: social drink. Drug use: No    Sexual activity: Yes     Partners: Female   Other Topics Concern    Not on file   Social History Narrative    Not on file     Social Determinants of Health     Financial Resource Strain: Low Risk     Difficulty of Paying Living Expenses: Not hard at all   Food Insecurity: No Food Insecurity    Worried About [address] in the Last Year: Never true    [address] in the Last Year: Never true   Transportation Needs: No Transportation Needs    Lack of Transportation (Medical): No    Lack of Transportation (Non-Medical):  No   Physical Activity: Not on file   Stress: Not on file   Social Connections: Not on file   Intimate Partner Violence: Not on file   Housing Stability: Not on file      Past Medical History:   Diagnosis Date    Anxiety     Depression     DVT (deep venous thrombosis) ([facility] [address]     Hyperlipidemia     Hypertension     borderline     Osteoarthritis     knees    Torn ACL     left, right    Torn meniscus     left, right     Past Surgical History:   Procedure Laterality Date    COLONOSCOPY N/A 2021    COLONOSCOPY DIAGNOSTIC performed by Rakan Mendes MD at [address] [place] [address]      tooth extraction    KNEE ARTHROSCOPY Left        Review of Systems Constitutional:  Positive for unexpected weight change (up). Negative for chills and fever. HENT:  Positive for trouble swallowing. Negative for nosebleeds. Respiratory:  Negative for cough and shortness of breath. Cardiovascular:  Negative for chest pain, palpitations and leg swelling. Gastrointestinal:  Negative for abdominal pain, blood in stool, diarrhea, nausea and vomiting. Musculoskeletal:  Positive for arthralgias and gait problem. Neurological:  Positive for numbness (medial palmar fingers). Negative for dizziness, light-headedness and headaches. Psychiatric/Behavioral:  Positive for confusion. OBJECTIVE:  /69 (Site: Right Upper Arm, Position: Sitting, Cuff Size: Medium Adult)   Pulse 72   Temp 98.2 °F (36.8 °C) (Temporal)   Ht 5' 7\" (1.702 m)   Wt 186 lb (84.4 kg)   SpO2 96%   BMI 29.13 kg/m²      Body mass index is 29.13 kg/m². Physical Exam  Constitutional:       General: He is not in acute distress. Appearance: Normal appearance. He is well-developed. He is obese. HENT:      Head: Normocephalic and atraumatic. Eyes:      Conjunctiva/sclera: Conjunctivae normal.   Neck:      Thyroid: No thyromegaly. Vascular: No JVD. Trachea: No tracheal deviation. Cardiovascular:      Rate and Rhythm: Normal rate and regular rhythm. Heart sounds: Normal heart sounds. No murmur heard. No friction rub. No gallop. Pulmonary:      Effort: Pulmonary effort is normal.      Breath sounds: Normal breath sounds. Chest:      Chest wall: No tenderness. Abdominal:      General: There is no distension. Palpations: Abdomen is soft. Tenderness: There is no abdominal tenderness. Comments: No  hsm    Genitourinary:     Penis: No tenderness. Musculoskeletal:         General: No swelling. Cervical back: Neck supple. Right lower leg: No edema. Left lower leg: No edema.       Comments: Right wrist in spint   Lymphadenopathy: Cervical: No cervical adenopathy. Skin:     General: Skin is warm and dry. Findings: No bruising. Neurological:      General: No focal deficit present. Mental Status: He is alert. Motor: No abnormal muscle tone. Gait: Gait normal.      Deep Tendon Reflexes: Reflexes are normal and symmetric. ASSESSMENT/PLAN:    Yael Ponce was seen today for follow-up and shoulder pain. Diagnoses and all orders for this visit:    Chronic pain of both knees  Comments:  follow up with ortho ? job with less walking  Orders:  -     gabapentin (NEURONTIN) 300 MG capsule; Take 1 capsule by mouth 2 times daily for 60 days. Max Daily Amount: 600 mg    Bilateral carpal tunnel syndrome  Comments:  splings     Drug abuse ([address]  Comments:  denies any more drugs  will not give pain meds     Acute deep vein thrombosis (DVT) of femoral vein of right lower extremity (HCC)  Comments:  on NOAC and doing fine     Acute pulmonary embolism without acute cor pulmonale, unspecified pulmonary embolism type ([address]      Orders Placed This Encounter   Medications    gabapentin (NEURONTIN) 300 MG capsule     Sig: Take 1 capsule by mouth 2 times daily for 60 days. Max Daily Amount: 600 mg     Dispense:  60 capsule     Refill:  1          Return in about 3 months (around 5/1/2023), or if symptoms worsen or fail to improve, for pain . There are no Patient Instructions on file for this visit.

## 2023-02-03 ASSESSMENT — ENCOUNTER SYMPTOMS
TROUBLE SWALLOWING: 1
COUGH: 0
VOMITING: 0
BLOOD IN STOOL: 0
SHORTNESS OF BREATH: 0
ABDOMINAL PAIN: 0
NAUSEA: 0
DIARRHEA: 0

## 2023-02-15 RX ORDER — HYDROXYZINE HYDROCHLORIDE 25 MG/1
TABLET, FILM COATED ORAL
Qty: 60 TABLET | Refills: 2 | Status: SHIPPED | OUTPATIENT
Start: 2023-02-15

## 2023-02-21 ENCOUNTER — APPOINTMENT (OUTPATIENT)
Dept: GENERAL RADIOLOGY | Age: 58
End: 2023-02-21
Payer: COMMERCIAL

## 2023-02-21 ENCOUNTER — HOSPITAL ENCOUNTER (EMERGENCY)
Age: 58
Discharge: HOME | End: 2023-02-21
Attending: STUDENT IN AN ORGANIZED HEALTH CARE EDUCATION/TRAINING PROGRAM
Payer: COMMERCIAL

## 2023-02-21 VITALS
HEART RATE: 96 BPM | OXYGEN SATURATION: 98 % | BODY MASS INDEX: 31.7 KG/M2 | RESPIRATION RATE: 13 BRPM | WEIGHT: 201.94 LBS | SYSTOLIC BLOOD PRESSURE: 145 MMHG | DIASTOLIC BLOOD PRESSURE: 92 MMHG | TEMPERATURE: 98.2 F | HEIGHT: 67 IN

## 2023-02-21 DIAGNOSIS — T40.411A ACCIDENTAL FENTANYL OVERDOSE, INITIAL ENCOUNTER (HCC): Primary | ICD-10-CM

## 2023-02-21 LAB
A/G RATIO: 1.5 (ref 1.1–2.2)
ALBUMIN SERPL-MCNC: 4.5 G/DL (ref 3.4–5)
ALP BLD-CCNC: 112 U/L (ref 40–129)
ALT SERPL-CCNC: 47 U/L (ref 10–40)
AMPHETAMINE SCREEN, URINE: ABNORMAL
ANION GAP SERPL CALCULATED.3IONS-SCNC: 13 MMOL/L (ref 3–16)
AST SERPL-CCNC: 113 U/L (ref 15–37)
BARBITURATE SCREEN URINE: ABNORMAL
BASOPHILS ABSOLUTE: 0 K/UL (ref 0–0.2)
BASOPHILS RELATIVE PERCENT: 0.5 %
BENZODIAZEPINE SCREEN, URINE: ABNORMAL
BILIRUB SERPL-MCNC: <0.2 MG/DL (ref 0–1)
BUN BLDV-MCNC: 23 MG/DL (ref 7–20)
CALCIUM SERPL-MCNC: 9.2 MG/DL (ref 8.3–10.6)
CANNABINOID SCREEN URINE: POSITIVE
CHLORIDE BLD-SCNC: 103 MMOL/L (ref 99–110)
CO2: 27 MMOL/L (ref 21–32)
COCAINE METABOLITE SCREEN URINE: ABNORMAL
CREAT SERPL-MCNC: 1.1 MG/DL (ref 0.9–1.3)
EOSINOPHILS ABSOLUTE: 0.2 K/UL (ref 0–0.6)
EOSINOPHILS RELATIVE PERCENT: 2.4 %
FENTANYL SCREEN, URINE: POSITIVE
GFR SERPL CREATININE-BSD FRML MDRD: >60 ML/MIN/{1.73_M2}
GLUCOSE BLD-MCNC: 147 MG/DL (ref 70–99)
HCT VFR BLD CALC: 40.3 % (ref 40.5–52.5)
HEMOGLOBIN: 12.5 G/DL (ref 13.5–17.5)
IMMATURE GRANULOCYTES #: 0.1 K/UL (ref 0–0.2)
IMMATURE GRANULOCYTES %: 0.9 %
LYMPHOCYTES ABSOLUTE: 1.1 K/UL (ref 1–5.1)
LYMPHOCYTES RELATIVE PERCENT: 17.7 %
Lab: ABNORMAL
MCH RBC QN AUTO: 30 PG (ref 26–34)
MCHC RBC AUTO-ENTMCNC: 31 G/DL (ref 32–36.4)
MCV RBC AUTO: 96.6 FL (ref 80–100)
METHADONE SCREEN, URINE: ABNORMAL
MONOCYTES ABSOLUTE: 0.3 K/UL (ref 0–1.3)
MONOCYTES RELATIVE PERCENT: 4.6 %
NEUTROPHILS ABSOLUTE: 4.7 K/UL (ref 1.7–7.7)
NEUTROPHILS RELATIVE PERCENT: 73.9 %
OPIATE SCREEN URINE: ABNORMAL
OXYCODONE URINE: ABNORMAL
PDW BLD-RTO: 15.6 % (ref 12.4–15.4)
PH UA: 6
PHENCYCLIDINE SCREEN URINE: ABNORMAL
PLATELET # BLD: 237 K/UL (ref 135–450)
PMV BLD AUTO: 8.8 FL (ref 5–10.5)
POTASSIUM SERPL-SCNC: 4.7 MMOL/L (ref 3.5–5.1)
RBC # BLD: 4.17 M/UL (ref 4.2–5.9)
SODIUM BLD-SCNC: 143 MMOL/L (ref 136–145)
TOTAL PROTEIN: 7.5 G/DL (ref 6.4–8.2)
TROPONIN: <0.01 NG/ML
WBC # BLD: 6.3 K/UL (ref 4–11)

## 2023-02-21 PROCEDURE — 85025 COMPLETE CBC W/AUTO DIFF WBC: CPT

## 2023-02-21 PROCEDURE — 84484 ASSAY OF TROPONIN QUANT: CPT

## 2023-02-21 PROCEDURE — 93005 ELECTROCARDIOGRAM TRACING: CPT | Performed by: STUDENT IN AN ORGANIZED HEALTH CARE EDUCATION/TRAINING PROGRAM

## 2023-02-21 PROCEDURE — 80307 DRUG TEST PRSMV CHEM ANLYZR: CPT

## 2023-02-21 PROCEDURE — 80053 COMPREHEN METABOLIC PANEL: CPT

## 2023-02-21 PROCEDURE — 99285 EMERGENCY DEPT VISIT HI MDM: CPT

## 2023-02-21 PROCEDURE — 71045 X-RAY EXAM CHEST 1 VIEW: CPT

## 2023-02-21 PROCEDURE — 36415 COLL VENOUS BLD VENIPUNCTURE: CPT

## 2023-02-21 PROCEDURE — 73552 X-RAY EXAM OF FEMUR 2/>: CPT

## 2023-02-21 ASSESSMENT — PAIN DESCRIPTION - PAIN TYPE: TYPE: ACUTE PAIN

## 2023-02-21 ASSESSMENT — PAIN - FUNCTIONAL ASSESSMENT
PAIN_FUNCTIONAL_ASSESSMENT: 0-10
PAIN_FUNCTIONAL_ASSESSMENT: 0-10

## 2023-02-21 ASSESSMENT — PAIN SCALES - GENERAL
PAINLEVEL_OUTOF10: 0
PAINLEVEL_OUTOF10: 0

## 2023-02-21 NOTE — ED PROVIDER NOTES
[address] [place] [address]            Pt Name: Reinaldo Romeo   MRN: [id number]   [place] 1965   Date of evaluation: 2/21/2023   Provider: Alanna Hardwick MD   PCP: Carson Street MD   Note Started: 4:36 PM EST 2/21/23          CHIEF COMPLAINT     Chief Complaint   Patient presents with    Drug Overdose     Patient was found unresponsive on the floor at work. [facility] gave patient 5 mg of Narcan nasal. Patient woke up and vomited. Hx of drug abuse. HISTORY OF PRESENT ILLNESS:   History from : Patient, EMS   Limitations to history : None     Reinaldo Romeo is a 59 y.o. male who presents with concern for possible drug overdose. Reportedly, patient was found unresponsive on the floor at work. He was treated with intranasal Narcan, woke up and vomited. Per chart review, does have a history of drug abuse. 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 He denies any chest pain or shortness of breath. States that he did not fall or hit his head. Complains of some mild pain in his right anterior thigh. He states that he is just really tired. He denies drug use. Denies other complaints or concerns and states \"I feel great. \"    Nursing Notes were all reviewed and agreed with, or any disagreements were addressed in the HPI. REVIEW OF SYSTEMS :    Positives and Pertinent negatives as per HPI. MEDICAL HISTORY   has a past medical history of Anxiety, Depression, Drug abuse ([address] DVT (deep venous thrombosis) ([facility] [address] Hyperlipidemia, Hypertension, Osteoarthritis, Torn ACL (2003), and Torn meniscus.     Past Surgical History:   Procedure Laterality Date    COLONOSCOPY N/A 4/13/2021    COLONOSCOPY DIAGNOSTIC performed by Jony Munson MD at [address] [place] [address]      tooth extraction    KNEE ARTHROSCOPY Left       CURRENTMEDICATIONS Previous Medications    ATORVASTATIN (LIPITOR) 20 MG TABLET    Take 1 tablet by mouth daily    B COMPLEX VITAMINS (VITAMIN B COMPLEX PO)    Take 1 tablet by mouth daily    BIOTIN 1 MG CAPS    Take by mouth    DICYCLOMINE (BENTYL) 10 MG CAPSULE    TAKE ONE CAPSULE BY MOUTH THREE TIMES A DAY AS NEEDED FOR ABDOMINAL BLOATING    GABAPENTIN (NEURONTIN) 300 MG CAPSULE    Take 1 capsule by mouth 2 times daily for 60 days. Max Daily Amount: 600 mg    GLUCOSAMINE-CHONDROITIN (GLUCOSAMINE CHONDR COMPLEX PO)    Take by mouth Take 2-3 by mouth every morning    HYDROXYZINE HCL (ATARAX) 25 MG TABLET    TAKE 1 TABLET BY MOUTH TWICE DAILY AS NEEDED FOR ANXIETY OR SLEEP    LISINOPRIL (PRINIVIL;ZESTRIL) 10 MG TABLET    Take 1 tablet by mouth daily    RIVAROXABAN (XARELTO) 20 MG TABS TABLET    Take 1 tablet by mouth daily (with breakfast)    VENLAFAXINE (EFFEXOR XR) 150 MG EXTENDED RELEASE CAPSULE    TAKE ONE CAPSULE BY MOUTH DAILY    VENLAFAXINE (EFFEXOR XR) 75 MG EXTENDED RELEASE CAPSULE    Take 2 capsules by mouth daily      SCREENINGS          Mary Coma Scale  Eye Opening: Spontaneous  Best Verbal Response: Oriented  Best Motor Response: Obeys commands  Richmond Coma Scale Score: 15                CIWA Assessment  BP: (!) 144/87  Heart Rate: 94                  PHYSICAL EXAM :  ED Triage Vitals   BP Temp Temp src Pulse Resp SpO2 Height Weight   -- -- -- -- -- -- -- --      GENERAL APPEARANCE: Awake and alert. Cooperative. No acute distress. Yawning. HEAD: Normocephalic. Atraumatic. EYES: PERRL. EOM's grossly intact. ENT: Mucous membranes are moist.   NECK: Supple, trachea midline. HEART: RRR. Normal S1, S2. No murmurs, rubs or gallops. LUNGS: Respirations unlabored. CTAB. Good air exchange. No wheezes, rales, or rhonchi. Speaking comfortably in full sentences. ABDOMEN: Soft. Non-distended. Non-tender. No guarding or rebound. Normal Bowel sounds. EXTREMITIES: No peripheral edema. MAEE.  No acute deformities. SKIN: Warm and dry. No acute rashes. NEUROLOGICAL: Alert and oriented X 3. CN II-XII intact. No gross facial drooping. Strength 5/5 in all extremities. Sensation intact. No pronator drift. Normal coordination. Gait normal.  PSYCHIATRIC: Normal mood and affect. DIAGNOSTIC RESULTS     LABS:   Labs Reviewed   CBC WITH AUTO DIFFERENTIAL - Abnormal; Notable for the following components:       Result Value    RBC 4.17 (*)     Hemoglobin 12.5 (*)     Hematocrit 40.3 (*)     MCHC 31.0 (*)     RDW 15.6 (*)     All other components within normal limits   COMPREHENSIVE METABOLIC PANEL - Abnormal; Notable for the following components:    Glucose 147 (*)     BUN 23 (*)     ALT 47 (*)      (*)     All other components within normal limits   URINE DRUG SCREEN - Abnormal; Notable for the following components:    Cannabinoid Scrn, Ur POSITIVE (*)     FENTANYL SCREEN, URINE POSITIVE (*)     All other components within normal limits   TROPONIN      When ordered only abnormal lab results are displayed. All other labs were within normal range or not returned as of this dictation. RADIOLOGY:      Non-plain film images such as CT, Ultrasound and MRI are read by the radiologist. Plain radiographic images are visualized and preliminarily interpreted by the ED Provider with the below findings:   Interpretation per the Radiologist below, if available at the time of this note:     XR FEMUR RIGHT (MIN 2 VIEWS)   Final Result   No fracture         XR CHEST PORTABLE   Final Result      Lungs are clear            No results found. EKG:  The Ekg interpreted by me shows  normal sinus rhythm with a rate of 89  Axis is   Left axis deviation  QTc is  normal  Incomplete right bundle branch block, left anterior fascicular block      ST Segments: nonspecific changes  T wave inversions in multiple leads have improved compared to previous performed 12/6/2022    PROCEDURES   Unless otherwise noted below, none CRITICAL CARE TIME   0         Vitals:    Vitals:    02/21/23 1700 02/21/23 1730 02/21/23 1745 02/21/23 1800   BP: 136/74 132/75 122/78 (!) 144/87   Pulse: 93 96 92 94   Resp: 17 20 13 19   Temp:       TempSrc:       SpO2:  91% 100% 95%   Weight:       Height:                 Is this patient to be included in the SEP-1 Core Measure due to severe sepsis or septic shock? No   Exclusion criteria - the patient is NOT to be included for SEP-1 Core Measure due to: Infection is not suspected       CC/HPI Summary, DDx, ED Course, and Reassessment: Patient is a 61-year-old male, presenting with concerns for being found unresponsive at work, concern for overdose. Upon arrival in the ED, vitals reassuring. Patient is resting comfortably and is in no acute distress. He denies any drug use but per chart review does have a history of fentanyl abuse. He reportedly became responsive after receiving intranasal Narcan prior to arrival and threw up once. He has no current complaints and states that he feels great but tired. He is complaining of some vague pain in his right anterior thigh. No known injury. States he did not hit his head and has normal neurologic exam.  Labs are performed and are reassuring. EKG actually improved compared to his previous that was performed in December. Troponin is negative. Chest x-ray and x-ray of the right femur were performed which were negative for acute concerning findings. Urine drug screen did test positive for fentanyl. Findings are consistent with opiate overdose. Patient was monitored for approximately 2 hours after Narcan administration and required no repeat dosing. Feel that he is appropriate for discharge home. Given return precautions. Will be sent with a prescription for Narcan to go.        Patient was given the following medications:   Medications - No data to display     CONSULTS:   None   Discussion with Other Professionals: None   Social Determinants: None Chronic Conditions: Fentanyl use disorder  Records Reviewed: ED visit from 12/26/2022      Disposition Considerations: Appropriate for outpatient management  I am the Primary Clinician of Record. FINAL IMPRESSION    1.  Accidental fentanyl overdose, initial encounter [facility])           DISPOSITION/PLAN     PATIENT REFERRED TO:   Mia Odonnell [address] [place] [address]  [phone]    Schedule an appointment as soon as possible for a visit       [facility]  [address]  [phone]  Go to   If symptoms worsen     DISCHARGE MEDICATIONS:   New Prescriptions    NALOXONE HCL (NALOXONE OPIATE OVERDOSE KIT)    1 each by Nasal route once for 1 dose        DISCONTINUED MEDICATIONS:   Discontinued Medications    No medications on file              (Please note that portions of this note were completed with a voice recognition program.  Efforts were made to edit the dictations but occasionally words are mis-transcribed.)       Paco Brantley MD (electronically signed)             Paco Brantley MD  02/21/23 Rosalino Headley

## 2023-02-21 NOTE — ED NOTES
Patient was given worker's comp paperwork to fill out. He states, he can't see it d/t not having his readers. [facility] desk gave patient reader's to wear. Patient states, he still can't see paperwork.  Informed       Jazzmine Reeves RN  02/21/23 1418

## 2023-02-21 NOTE — ED NOTES
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 He denies any drug use. He states, he is just tired. He has a hx of  Fentanyl use. He reports he is going thru a divorce.        Cari Meckel, RN  02/21/23 8869

## 2023-02-21 NOTE — ED NOTES
Patient's mother is helping him fill out he worker's comp form      [facility], RN  02/21/23 9088

## 2023-02-21 NOTE — ED NOTES
Patient gave urine sample for ODACS.     Gave patient discharge instructions. He states, understanding. Patient discharged to home     Lily Bertrand RN  02/21/23 9575

## 2023-02-23 ENCOUNTER — OFFICE VISIT (OUTPATIENT)
Dept: FAMILY MEDICINE CLINIC | Age: 58
End: 2023-02-23
Payer: COMMERCIAL

## 2023-02-23 VITALS
HEART RATE: 86 BPM | SYSTOLIC BLOOD PRESSURE: 158 MMHG | TEMPERATURE: 99.5 F | WEIGHT: 200.8 LBS | HEIGHT: 67 IN | OXYGEN SATURATION: 96 % | BODY MASS INDEX: 31.52 KG/M2 | DIASTOLIC BLOOD PRESSURE: 80 MMHG

## 2023-02-23 DIAGNOSIS — I10 PRIMARY HYPERTENSION: ICD-10-CM

## 2023-02-23 DIAGNOSIS — T50.901D ACCIDENTAL OVERDOSE, SUBSEQUENT ENCOUNTER: Primary | ICD-10-CM

## 2023-02-23 LAB
EKG ATRIAL RATE: 89 BPM
EKG DIAGNOSIS: NORMAL
EKG P AXIS: 50 DEGREES
EKG P-R INTERVAL: 142 MS
EKG Q-T INTERVAL: 356 MS
EKG QRS DURATION: 98 MS
EKG QTC CALCULATION (BAZETT): 433 MS
EKG R AXIS: -61 DEGREES
EKG T AXIS: 88 DEGREES
EKG VENTRICULAR RATE: 89 BPM

## 2023-02-23 PROCEDURE — G8417 CALC BMI ABV UP PARAM F/U: HCPCS | Performed by: NURSE PRACTITIONER

## 2023-02-23 PROCEDURE — 4004F PT TOBACCO SCREEN RCVD TLK: CPT | Performed by: NURSE PRACTITIONER

## 2023-02-23 PROCEDURE — 3077F SYST BP >= 140 MM HG: CPT | Performed by: NURSE PRACTITIONER

## 2023-02-23 PROCEDURE — 3017F COLORECTAL CA SCREEN DOC REV: CPT | Performed by: NURSE PRACTITIONER

## 2023-02-23 PROCEDURE — G8427 DOCREV CUR MEDS BY ELIG CLIN: HCPCS | Performed by: NURSE PRACTITIONER

## 2023-02-23 PROCEDURE — 3079F DIAST BP 80-89 MM HG: CPT | Performed by: NURSE PRACTITIONER

## 2023-02-23 PROCEDURE — G8484 FLU IMMUNIZE NO ADMIN: HCPCS | Performed by: NURSE PRACTITIONER

## 2023-02-23 PROCEDURE — 99213 OFFICE O/P EST LOW 20 MIN: CPT | Performed by: NURSE PRACTITIONER

## 2023-02-23 ASSESSMENT — ENCOUNTER SYMPTOMS
COUGH: 0
SHORTNESS OF BREATH: 0
ABDOMINAL PAIN: 0

## 2023-02-23 NOTE — LETTER
[employer] [facility]  [address] [facility] [address]  Phone: [phone]  Fax: [phone]    ROXANA Yeung NP        February 23, 2023     Patient: Juvenal Scherer   YOB: 1965   Date of Visit: 2/23/2023       To Whom It May Concern: It is my medical opinion that Nay Irwin may return to work with no restrictions. If you have any questions or concerns, please don't hesitate to call.     Sincerely,        ROXANA Yeung NP

## 2023-03-21 RX ORDER — VENLAFAXINE HYDROCHLORIDE 150 MG/1
150 CAPSULE, EXTENDED RELEASE ORAL DAILY
Qty: 90 CAPSULE | Refills: 1 | OUTPATIENT
Start: 2023-03-21

## 2023-03-21 RX ORDER — VENLAFAXINE HYDROCHLORIDE 75 MG/1
75 CAPSULE, EXTENDED RELEASE ORAL DAILY
Qty: 90 CAPSULE | Refills: 1 | Status: SHIPPED | OUTPATIENT
Start: 2023-03-21

## 2023-03-21 RX ORDER — VENLAFAXINE HYDROCHLORIDE 150 MG/1
150 CAPSULE, EXTENDED RELEASE ORAL DAILY
Qty: 90 CAPSULE | Refills: 1 | Status: SHIPPED | OUTPATIENT
Start: 2023-03-21

## 2023-03-21 RX ORDER — VENLAFAXINE HYDROCHLORIDE 75 MG/1
75 CAPSULE, EXTENDED RELEASE ORAL DAILY
Qty: 90 CAPSULE | Refills: 1 | OUTPATIENT
Start: 2023-03-21

## 2023-03-21 NOTE — TELEPHONE ENCOUNTER
LOV: 2.23.23  NOV: 5.1.23    Look like this was discontinued and pt was put on 2 of the 75mg.  Refill in for that one as well

## 2023-04-15 DIAGNOSIS — M25.561 CHRONIC PAIN OF BOTH KNEES: ICD-10-CM

## 2023-04-15 DIAGNOSIS — M25.562 CHRONIC PAIN OF BOTH KNEES: ICD-10-CM

## 2023-04-15 DIAGNOSIS — G89.29 CHRONIC PAIN OF BOTH KNEES: ICD-10-CM

## 2023-04-17 RX ORDER — GABAPENTIN 300 MG/1
300 CAPSULE ORAL 2 TIMES DAILY
Qty: 60 CAPSULE | Refills: 1 | Status: SHIPPED | OUTPATIENT
Start: 2023-04-17 | End: 2023-06-16

## 2023-05-01 ENCOUNTER — OFFICE VISIT (OUTPATIENT)
Dept: FAMILY MEDICINE CLINIC | Age: 58
End: 2023-05-01
Payer: COMMERCIAL

## 2023-05-01 VITALS
WEIGHT: 199 LBS | HEIGHT: 67 IN | SYSTOLIC BLOOD PRESSURE: 140 MMHG | BODY MASS INDEX: 31.23 KG/M2 | TEMPERATURE: 98 F | DIASTOLIC BLOOD PRESSURE: 76 MMHG | OXYGEN SATURATION: 98 % | HEART RATE: 90 BPM

## 2023-05-01 DIAGNOSIS — I82.5Y1 CHRONIC DEEP VEIN THROMBOSIS (DVT) OF PROXIMAL VEIN OF RIGHT LOWER EXTREMITY (HCC): Primary | ICD-10-CM

## 2023-05-01 DIAGNOSIS — I26.99 ACUTE PULMONARY EMBOLISM WITHOUT ACUTE COR PULMONALE, UNSPECIFIED PULMONARY EMBOLISM TYPE (HCC): ICD-10-CM

## 2023-05-01 DIAGNOSIS — M17.0 PRIMARY OSTEOARTHRITIS OF BOTH KNEES: ICD-10-CM

## 2023-05-01 DIAGNOSIS — I10 PRIMARY HYPERTENSION: ICD-10-CM

## 2023-05-01 DIAGNOSIS — F11.11 NARCOTIC ABUSE IN REMISSION (HCC): ICD-10-CM

## 2023-05-01 DIAGNOSIS — D64.9 ANEMIA, UNSPECIFIED TYPE: ICD-10-CM

## 2023-05-01 PROCEDURE — 3074F SYST BP LT 130 MM HG: CPT | Performed by: INTERNAL MEDICINE

## 2023-05-01 PROCEDURE — 3017F COLORECTAL CA SCREEN DOC REV: CPT | Performed by: INTERNAL MEDICINE

## 2023-05-01 PROCEDURE — 4004F PT TOBACCO SCREEN RCVD TLK: CPT | Performed by: INTERNAL MEDICINE

## 2023-05-01 PROCEDURE — G8427 DOCREV CUR MEDS BY ELIG CLIN: HCPCS | Performed by: INTERNAL MEDICINE

## 2023-05-01 PROCEDURE — G8417 CALC BMI ABV UP PARAM F/U: HCPCS | Performed by: INTERNAL MEDICINE

## 2023-05-01 PROCEDURE — 99214 OFFICE O/P EST MOD 30 MIN: CPT | Performed by: INTERNAL MEDICINE

## 2023-05-01 PROCEDURE — 3078F DIAST BP <80 MM HG: CPT | Performed by: INTERNAL MEDICINE

## 2023-05-01 NOTE — PROGRESS NOTES
Gait normal.      Deep Tendon Reflexes: Reflexes are normal and symmetric. ASSESSMENT/PLAN:    Yue Clarke was seen today for hypertension and depression. Diagnoses and all orders for this visit:    Chronic deep vein thrombosis (DVT) of proximal vein of right lower extremity (HCC)  -     PROTEIN C ANTIGEN, TOTAL; Future  -     Protein S Activity; Future  -     ANTITHROMBIN PANEL; Future  -     FACTOR 5 LEIDEN; Future  -     LUPUS ANTICOAGULANT; Future    Acute pulmonary embolism without acute cor pulmonale, unspecified pulmonary embolism type (HCC)  -     PROTEIN C ANTIGEN, TOTAL; Future  -     Protein S Activity; Future  -     ANTITHROMBIN PANEL; Future  -     FACTOR 5 LEIDEN; Future  -     LUPUS ANTICOAGULANT; Future    Narcotic abuse in remission ([facility] [address] no further fentanyl  Again discussed likely cause of problems     Primary hypertension low caffeine salt and observe  on lisinopril discussed going up but per patient better at home     Primary osteoarthritis of both knees improving      depression  weaning ailyn effexor dose with withdrawal symptoms     Anemia, unspecified type  -     CBC with Auto Differential; Future        No orders of the defined types were placed in this encounter. Return in about 18 weeks (around 9/4/2023), or if symptoms worsen or fail to improve, for bp and . There are no Patient Instructions on file for this visit.

## 2023-05-06 ASSESSMENT — ENCOUNTER SYMPTOMS
BLOOD IN STOOL: 0
DIARRHEA: 0
COUGH: 0
ABDOMINAL PAIN: 0
VOMITING: 0
NAUSEA: 0
SHORTNESS OF BREATH: 0

## 2023-05-20 DIAGNOSIS — E78.00 PURE HYPERCHOLESTEROLEMIA: ICD-10-CM

## 2023-05-22 RX ORDER — ATORVASTATIN CALCIUM 20 MG/1
20 TABLET, FILM COATED ORAL DAILY
Qty: 90 TABLET | Refills: 1 | Status: SHIPPED | OUTPATIENT
Start: 2023-05-22

## 2023-06-05 RX ORDER — LISINOPRIL 10 MG/1
10 TABLET ORAL DAILY
Qty: 90 TABLET | Refills: 1 | Status: SHIPPED | OUTPATIENT
Start: 2023-06-05

## 2023-06-22 RX ORDER — HYDROXYZINE HYDROCHLORIDE 25 MG/1
TABLET, FILM COATED ORAL
Qty: 60 TABLET | Refills: 2 | Status: SHIPPED | OUTPATIENT
Start: 2023-06-22

## 2023-07-31 ENCOUNTER — TELEPHONE (OUTPATIENT)
Dept: FAMILY MEDICINE CLINIC | Age: 58
End: 2023-07-31

## 2023-07-31 NOTE — TELEPHONE ENCOUNTER
Patient's spouse has quess on drug tests in my chart that he has questions about. Please call Emerita Becker at [phone].

## 2023-07-31 NOTE — TELEPHONE ENCOUNTER
Returned call to pt's spouse Bonnie Quarles on [employer] wanted to know what the \"A\" means on the urine drug screen from 2/21/23.    Informed her it means Abnormal.

## 2023-08-02 ENCOUNTER — TELEPHONE (OUTPATIENT)
Dept: FAMILY MEDICINE CLINIC | Age: 58
End: 2023-08-02

## 2023-08-02 NOTE — TELEPHONE ENCOUNTER
----- Message from Mayito Hawley. Deandre Lipscomb sent at 8/2/2023  3:50 PM EDT -----  Regarding: Xarelto  Contact: [phone]  Hi, I thought Dr Rayna Toney said I would be done with xarelto after the July script. ..  neetu tells me they are refilling another prescription. . should I cancel the script or take it for another month?

## 2023-09-05 ENCOUNTER — OFFICE VISIT (OUTPATIENT)
Dept: FAMILY MEDICINE CLINIC | Age: 58
End: 2023-09-05
Payer: COMMERCIAL

## 2023-09-05 VITALS
HEART RATE: 67 BPM | DIASTOLIC BLOOD PRESSURE: 112 MMHG | HEIGHT: 67 IN | WEIGHT: 200 LBS | TEMPERATURE: 98.2 F | BODY MASS INDEX: 31.39 KG/M2 | SYSTOLIC BLOOD PRESSURE: 170 MMHG | OXYGEN SATURATION: 94 %

## 2023-09-05 DIAGNOSIS — I26.99 ACUTE PULMONARY EMBOLISM WITHOUT ACUTE COR PULMONALE, UNSPECIFIED PULMONARY EMBOLISM TYPE (HCC): ICD-10-CM

## 2023-09-05 DIAGNOSIS — R73.9 HYPERGLYCEMIA: ICD-10-CM

## 2023-09-05 DIAGNOSIS — E78.00 PURE HYPERCHOLESTEROLEMIA: ICD-10-CM

## 2023-09-05 DIAGNOSIS — I82.5Y1 CHRONIC DEEP VEIN THROMBOSIS (DVT) OF PROXIMAL VEIN OF RIGHT LOWER EXTREMITY (HCC): Primary | ICD-10-CM

## 2023-09-05 DIAGNOSIS — D64.9 ANEMIA, UNSPECIFIED TYPE: ICD-10-CM

## 2023-09-05 DIAGNOSIS — M25.562 CHRONIC PAIN OF BOTH KNEES: ICD-10-CM

## 2023-09-05 DIAGNOSIS — I10 PRIMARY HYPERTENSION: ICD-10-CM

## 2023-09-05 DIAGNOSIS — G89.29 CHRONIC PAIN OF BOTH KNEES: ICD-10-CM

## 2023-09-05 DIAGNOSIS — I82.5Y1 CHRONIC DEEP VEIN THROMBOSIS (DVT) OF PROXIMAL VEIN OF RIGHT LOWER EXTREMITY (HCC): ICD-10-CM

## 2023-09-05 DIAGNOSIS — M25.561 CHRONIC PAIN OF BOTH KNEES: ICD-10-CM

## 2023-09-05 DIAGNOSIS — R74.8 ELEVATED LIVER ENZYMES: ICD-10-CM

## 2023-09-05 LAB
BASOPHILS # BLD: 0 K/UL (ref 0–0.2)
BASOPHILS NFR BLD: 0.5 %
DEPRECATED RDW RBC AUTO: 14.7 % (ref 12.4–15.4)
EOSINOPHIL # BLD: 0.3 K/UL (ref 0–0.6)
EOSINOPHIL NFR BLD: 4.4 %
HCT VFR BLD AUTO: 39.3 % (ref 40.5–52.5)
HGB BLD-MCNC: 13.1 G/DL (ref 13.5–17.5)
LYMPHOCYTES # BLD: 1.8 K/UL (ref 1–5.1)
LYMPHOCYTES NFR BLD: 26.6 %
MCH RBC QN AUTO: 30.6 PG (ref 26–34)
MCHC RBC AUTO-ENTMCNC: 33.4 G/DL (ref 31–36)
MCV RBC AUTO: 91.6 FL (ref 80–100)
MONOCYTES # BLD: 0.5 K/UL (ref 0–1.3)
MONOCYTES NFR BLD: 7.6 %
NEUTROPHILS # BLD: 4.1 K/UL (ref 1.7–7.7)
NEUTROPHILS NFR BLD: 60.9 %
PLATELET # BLD AUTO: 277 K/UL (ref 135–450)
PMV BLD AUTO: 8.4 FL (ref 5–10.5)
RBC # BLD AUTO: 4.29 M/UL (ref 4.2–5.9)
WBC # BLD AUTO: 6.7 K/UL (ref 4–11)

## 2023-09-05 PROCEDURE — 99214 OFFICE O/P EST MOD 30 MIN: CPT | Performed by: INTERNAL MEDICINE

## 2023-09-05 PROCEDURE — 3078F DIAST BP <80 MM HG: CPT | Performed by: INTERNAL MEDICINE

## 2023-09-05 PROCEDURE — G8417 CALC BMI ABV UP PARAM F/U: HCPCS | Performed by: INTERNAL MEDICINE

## 2023-09-05 PROCEDURE — 3017F COLORECTAL CA SCREEN DOC REV: CPT | Performed by: INTERNAL MEDICINE

## 2023-09-05 PROCEDURE — 4004F PT TOBACCO SCREEN RCVD TLK: CPT | Performed by: INTERNAL MEDICINE

## 2023-09-05 PROCEDURE — 3074F SYST BP LT 130 MM HG: CPT | Performed by: INTERNAL MEDICINE

## 2023-09-05 PROCEDURE — G8427 DOCREV CUR MEDS BY ELIG CLIN: HCPCS | Performed by: INTERNAL MEDICINE

## 2023-09-05 RX ORDER — GABAPENTIN 300 MG/1
300 CAPSULE ORAL 2 TIMES DAILY
Qty: 60 CAPSULE | Refills: 2 | Status: SHIPPED | OUTPATIENT
Start: 2023-09-05 | End: 2023-12-04

## 2023-09-05 RX ORDER — NIFEDIPINE 60 MG/1
60 TABLET, EXTENDED RELEASE ORAL DAILY
Qty: 90 TABLET | Refills: 1 | Status: SHIPPED | OUTPATIENT
Start: 2023-09-05

## 2023-09-05 RX ORDER — HYDROCHLOROTHIAZIDE 25 MG/1
25 TABLET ORAL EVERY MORNING
Qty: 90 TABLET | Refills: 1 | Status: SHIPPED | OUTPATIENT
Start: 2023-09-05

## 2023-09-06 ENCOUNTER — TELEPHONE (OUTPATIENT)
Dept: FAMILY MEDICINE CLINIC | Age: 58
End: 2023-09-06

## 2023-09-06 DIAGNOSIS — R73.9 HYPERGLYCEMIA: ICD-10-CM

## 2023-09-06 LAB
EST. AVERAGE GLUCOSE BLD GHB EST-MCNC: 116.9 MG/DL
HBA1C MFR BLD: 5.7 %

## 2023-09-06 NOTE — TELEPHONE ENCOUNTER
Lab Tech called and said that yesterday they received lab add ons but was only able to complete the A!C.

## 2023-09-07 LAB
APTT IMM NP PPP: 42 SEC (ref 32–48)
APTT P HEP NEUT PPP: ABNORMAL SEC (ref 32–48)
AT III ACT/NOR PPP CHRO: 109 % (ref 76–128)
AT III AG ACT/NOR PPP IA: 97 % (ref 82–136)
CONFIRM APTT STACLOT: ABNORMAL
DRVVT SCREEN TO CONFIRM RATIO: ABNORMAL RATIO
LA 3 SCREEN W REFLEX-IMP: ABNORMAL
LA NT DPL PPP QL: ABNORMAL
MIXING DRVVT: ABNORMAL SEC (ref 33–44)
PROT S ACT/NOR PPP: 108 % (ref 66–143)
PROTHROMBIN TIME: 12.5 SEC (ref 12–15.5)
REPTILASE TIME: ABNORMAL SEC
SCREEN APTT: 53 SEC (ref 32–48)
SCREEN DRVVT: 38 SEC (ref 33–44)
THROMBIN TIME: 16.6 SEC (ref 14.7–19.5)

## 2023-09-08 LAB
F5 P.R506Q BLD/T QL: NEGATIVE
SPECIMEN SOURCE: NORMAL

## 2023-09-08 ASSESSMENT — ENCOUNTER SYMPTOMS
CONSTIPATION: 0
BLOOD IN STOOL: 0
DIARRHEA: 0
COUGH: 0
NAUSEA: 0
ABDOMINAL PAIN: 0
VOMITING: 0
SHORTNESS OF BREATH: 0

## 2023-09-09 LAB — PROT C AG ACT/NOR PPP IA: >95 % (ref 63–153)

## 2023-09-26 ENCOUNTER — OFFICE VISIT (OUTPATIENT)
Dept: FAMILY MEDICINE CLINIC | Age: 58
End: 2023-09-26
Payer: COMMERCIAL

## 2023-09-26 VITALS
HEIGHT: 67 IN | WEIGHT: 197 LBS | OXYGEN SATURATION: 98 % | DIASTOLIC BLOOD PRESSURE: 98 MMHG | HEART RATE: 88 BPM | TEMPERATURE: 98.1 F | SYSTOLIC BLOOD PRESSURE: 156 MMHG | BODY MASS INDEX: 30.92 KG/M2

## 2023-09-26 DIAGNOSIS — M25.511 RIGHT SHOULDER PAIN, UNSPECIFIED CHRONICITY: ICD-10-CM

## 2023-09-26 DIAGNOSIS — G56.90 NEUROPATHY OF FINGER, UNSPECIFIED LATERALITY: ICD-10-CM

## 2023-09-26 DIAGNOSIS — F33.1 MODERATE EPISODE OF RECURRENT MAJOR DEPRESSIVE DISORDER (HCC): ICD-10-CM

## 2023-09-26 DIAGNOSIS — I10 PRIMARY HYPERTENSION: Primary | ICD-10-CM

## 2023-09-26 PROCEDURE — 99214 OFFICE O/P EST MOD 30 MIN: CPT | Performed by: INTERNAL MEDICINE

## 2023-09-26 PROCEDURE — 3017F COLORECTAL CA SCREEN DOC REV: CPT | Performed by: INTERNAL MEDICINE

## 2023-09-26 PROCEDURE — G8427 DOCREV CUR MEDS BY ELIG CLIN: HCPCS | Performed by: INTERNAL MEDICINE

## 2023-09-26 PROCEDURE — 3078F DIAST BP <80 MM HG: CPT | Performed by: INTERNAL MEDICINE

## 2023-09-26 PROCEDURE — G8417 CALC BMI ABV UP PARAM F/U: HCPCS | Performed by: INTERNAL MEDICINE

## 2023-09-26 PROCEDURE — 4004F PT TOBACCO SCREEN RCVD TLK: CPT | Performed by: INTERNAL MEDICINE

## 2023-09-26 PROCEDURE — 3074F SYST BP LT 130 MM HG: CPT | Performed by: INTERNAL MEDICINE

## 2023-09-26 RX ORDER — CYCLOBENZAPRINE HCL 10 MG
10 TABLET ORAL NIGHTLY PRN
Qty: 30 TABLET | Refills: 1 | Status: SHIPPED | OUTPATIENT
Start: 2023-09-26 | End: 2023-11-25

## 2023-09-26 RX ORDER — PREDNISONE 20 MG/1
TABLET ORAL
Qty: 10 TABLET | Refills: 0 | Status: SHIPPED | OUTPATIENT
Start: 2023-09-26

## 2023-09-26 RX ORDER — VENLAFAXINE HYDROCHLORIDE 75 MG/1
150 CAPSULE, EXTENDED RELEASE ORAL DAILY
Qty: 30 CAPSULE | Refills: 3 | Status: SHIPPED | OUTPATIENT
Start: 2023-09-26

## 2023-09-26 RX ORDER — VALSARTAN 160 MG/1
160 TABLET ORAL DAILY
Qty: 90 TABLET | Refills: 1 | Status: SHIPPED | OUTPATIENT
Start: 2023-09-26

## 2023-09-26 ASSESSMENT — PATIENT HEALTH QUESTIONNAIRE - PHQ9
1. LITTLE INTEREST OR PLEASURE IN DOING THINGS: 0
SUM OF ALL RESPONSES TO PHQ QUESTIONS 1-9: 0
2. FEELING DOWN, DEPRESSED OR HOPELESS: 0
SUM OF ALL RESPONSES TO PHQ9 QUESTIONS 1 & 2: 0
SUM OF ALL RESPONSES TO PHQ QUESTIONS 1-9: 0

## 2023-09-26 NOTE — PROGRESS NOTES
SUBJECTIVE:  Juan Francisco Ho is a 59 y.o. male being evaluated for:    Chief Complaint   Patient presents with    Anxiety     Same as last visit    Hypertension     Has not taken at home in over a week        HPI   Blood pressure 170/112 with last visit  No symptoms then or now  Blood pressure pills put on procardia high dose and hctz Taken off lisinopril     Put back on gabapentin and it did help his knees  Hurt his Right shoulder Right hand is going numb  Has slept on it wrong  no usual activity    Depression about the same No Known Allergies  Current Outpatient Medications   Medication Sig Dispense Refill    venlafaxine (EFFEXOR XR) 75 MG extended release capsule Take 2 capsules by mouth daily 30 capsule 3    valsartan (DIOVAN) 160 MG tablet Take 1 tablet by mouth daily 90 tablet 1    predniSONE (DELTASONE) 20 MG tablet 2 pills daily for 3 days, 1 pill daily for 3 days and 1/2 daily for 3 days 10 tablet 0    cyclobenzaprine (FLEXERIL) 10 MG tablet Take 1 tablet by mouth nightly as needed for Muscle spasms 30 tablet 1    NIFEdipine (PROCARDIA XL) 60 MG extended release tablet Take 1 tablet by mouth daily 90 tablet 1    gabapentin (NEURONTIN) 300 MG capsule Take 1 capsule by mouth in the morning and at bedtime for 90 days.  Max Daily Amount: 600 mg 60 capsule 2    hydroCHLOROthiazide (HYDRODIURIL) 25 MG tablet Take 1 tablet by mouth every morning 90 tablet 1    hydrOXYzine HCl (ATARAX) 25 MG tablet TAKE 1 TABLET BY MOUTH TWICE DAILY AS NEEDED FOR ANXIETY OR SLEEP 60 tablet 2    atorvastatin (LIPITOR) 20 MG tablet TAKE 1 TABLET BY MOUTH DAILY 90 tablet 1    dicyclomine (BENTYL) 10 MG capsule TAKE ONE CAPSULE BY MOUTH THREE TIMES A DAY AS NEEDED FOR ABDOMINAL BLOATING 90 capsule 3    Biotin 1 MG CAPS Take by mouth      B Complex Vitamins (VITAMIN B COMPLEX PO) Take 1 tablet by mouth daily      Glucosamine-Chondroitin (GLUCOSAMINE CHONDR COMPLEX PO) Take by mouth Take 2-3 by mouth every morning       No current

## 2023-09-30 ASSESSMENT — ENCOUNTER SYMPTOMS
CONSTIPATION: 0
VOMITING: 0
DIARRHEA: 0
ABDOMINAL PAIN: 0
COUGH: 0
BLOOD IN STOOL: 0
SHORTNESS OF BREATH: 0
NAUSEA: 0

## 2023-10-19 RX ORDER — DICYCLOMINE HYDROCHLORIDE 10 MG/1
10 CAPSULE ORAL 3 TIMES DAILY PRN
Qty: 270 CAPSULE | Refills: 0 | Status: SHIPPED | OUTPATIENT
Start: 2023-10-19

## 2023-11-06 DIAGNOSIS — I10 PRIMARY HYPERTENSION: ICD-10-CM

## 2023-11-06 DIAGNOSIS — R74.8 ELEVATED LIVER ENZYMES: ICD-10-CM

## 2023-11-06 LAB
ALBUMIN SERPL-MCNC: 4.6 G/DL (ref 3.4–5)
ALP SERPL-CCNC: 122 U/L (ref 40–129)
ALT SERPL-CCNC: 15 U/L (ref 10–40)
ANION GAP SERPL CALCULATED.3IONS-SCNC: 11 MMOL/L (ref 3–16)
AST SERPL-CCNC: 19 U/L (ref 15–37)
BILIRUB DIRECT SERPL-MCNC: <0.2 MG/DL (ref 0–0.3)
BILIRUB INDIRECT SERPL-MCNC: NORMAL MG/DL (ref 0–1)
BILIRUB SERPL-MCNC: 0.3 MG/DL (ref 0–1)
BUN SERPL-MCNC: 18 MG/DL (ref 7–20)
CALCIUM SERPL-MCNC: 9.8 MG/DL (ref 8.3–10.6)
CHLORIDE SERPL-SCNC: 102 MMOL/L (ref 99–110)
CO2 SERPL-SCNC: 28 MMOL/L (ref 21–32)
CREAT SERPL-MCNC: 1.1 MG/DL (ref 0.8–1.3)
GFR SERPLBLD CREATININE-BSD FMLA CKD-EPI: >60 ML/MIN/{1.73_M2}
GLUCOSE SERPL-MCNC: 91 MG/DL (ref 70–99)
PHOSPHATE SERPL-MCNC: 3.8 MG/DL (ref 2.5–4.9)
POTASSIUM SERPL-SCNC: 4 MMOL/L (ref 3.5–5.1)
PROT SERPL-MCNC: 6.7 G/DL (ref 6.4–8.2)
SODIUM SERPL-SCNC: 141 MMOL/L (ref 136–145)

## 2023-11-07 ENCOUNTER — OFFICE VISIT (OUTPATIENT)
Dept: FAMILY MEDICINE CLINIC | Age: 58
End: 2023-11-07
Payer: COMMERCIAL

## 2023-11-07 VITALS
TEMPERATURE: 98.2 F | WEIGHT: 201 LBS | HEIGHT: 67 IN | HEART RATE: 85 BPM | BODY MASS INDEX: 31.55 KG/M2 | DIASTOLIC BLOOD PRESSURE: 82 MMHG | SYSTOLIC BLOOD PRESSURE: 136 MMHG | OXYGEN SATURATION: 96 %

## 2023-11-07 DIAGNOSIS — M25.561 CHRONIC PAIN OF BOTH KNEES: ICD-10-CM

## 2023-11-07 DIAGNOSIS — E78.00 PURE HYPERCHOLESTEROLEMIA: ICD-10-CM

## 2023-11-07 DIAGNOSIS — R53.83 OTHER FATIGUE: ICD-10-CM

## 2023-11-07 DIAGNOSIS — F33.1 MODERATE EPISODE OF RECURRENT MAJOR DEPRESSIVE DISORDER (HCC): ICD-10-CM

## 2023-11-07 DIAGNOSIS — I10 PRIMARY HYPERTENSION: ICD-10-CM

## 2023-11-07 DIAGNOSIS — M25.562 CHRONIC PAIN OF BOTH KNEES: ICD-10-CM

## 2023-11-07 DIAGNOSIS — G56.91 NEUROPATHY, ARM, RIGHT: ICD-10-CM

## 2023-11-07 DIAGNOSIS — G89.29 CHRONIC PAIN OF BOTH KNEES: ICD-10-CM

## 2023-11-07 DIAGNOSIS — M25.511 RIGHT SHOULDER PAIN, UNSPECIFIED CHRONICITY: ICD-10-CM

## 2023-11-07 DIAGNOSIS — G62.9 NEUROPATHY: Primary | ICD-10-CM

## 2023-11-07 DIAGNOSIS — G56.92 NEUROPATHY, ARM, LEFT: ICD-10-CM

## 2023-11-07 PROCEDURE — 3078F DIAST BP <80 MM HG: CPT | Performed by: INTERNAL MEDICINE

## 2023-11-07 PROCEDURE — G8427 DOCREV CUR MEDS BY ELIG CLIN: HCPCS | Performed by: INTERNAL MEDICINE

## 2023-11-07 PROCEDURE — G8417 CALC BMI ABV UP PARAM F/U: HCPCS | Performed by: INTERNAL MEDICINE

## 2023-11-07 PROCEDURE — 3074F SYST BP LT 130 MM HG: CPT | Performed by: INTERNAL MEDICINE

## 2023-11-07 PROCEDURE — 99214 OFFICE O/P EST MOD 30 MIN: CPT | Performed by: INTERNAL MEDICINE

## 2023-11-07 PROCEDURE — 3017F COLORECTAL CA SCREEN DOC REV: CPT | Performed by: INTERNAL MEDICINE

## 2023-11-07 PROCEDURE — 4004F PT TOBACCO SCREEN RCVD TLK: CPT | Performed by: INTERNAL MEDICINE

## 2023-11-07 PROCEDURE — G8484 FLU IMMUNIZE NO ADMIN: HCPCS | Performed by: INTERNAL MEDICINE

## 2023-11-07 ASSESSMENT — PATIENT HEALTH QUESTIONNAIRE - PHQ9
SUM OF ALL RESPONSES TO PHQ QUESTIONS 1-9: 0
6. FEELING BAD ABOUT YOURSELF - OR THAT YOU ARE A FAILURE OR HAVE LET YOURSELF OR YOUR FAMILY DOWN: 0
1. LITTLE INTEREST OR PLEASURE IN DOING THINGS: 0
SUM OF ALL RESPONSES TO PHQ9 QUESTIONS 1 & 2: 0
SUM OF ALL RESPONSES TO PHQ QUESTIONS 1-9: 0
9. THOUGHTS THAT YOU WOULD BE BETTER OFF DEAD, OR OF HURTING YOURSELF: 0
5. POOR APPETITE OR OVEREATING: 0
10. IF YOU CHECKED OFF ANY PROBLEMS, HOW DIFFICULT HAVE THESE PROBLEMS MADE IT FOR YOU TO DO YOUR WORK, TAKE CARE OF THINGS AT HOME, OR GET ALONG WITH OTHER PEOPLE: 0
2. FEELING DOWN, DEPRESSED OR HOPELESS: 0
8. MOVING OR SPEAKING SO SLOWLY THAT OTHER PEOPLE COULD HAVE NOTICED. OR THE OPPOSITE, BEING SO FIGETY OR RESTLESS THAT YOU HAVE BEEN MOVING AROUND A LOT MORE THAN USUAL: 0
SUM OF ALL RESPONSES TO PHQ QUESTIONS 1-9: 0
4. FEELING TIRED OR HAVING LITTLE ENERGY: 0
SUM OF ALL RESPONSES TO PHQ QUESTIONS 1-9: 0
3. TROUBLE FALLING OR STAYING ASLEEP: 0
7. TROUBLE CONCENTRATING ON THINGS, SUCH AS READING THE NEWSPAPER OR WATCHING TELEVISION: 0

## 2023-11-07 ASSESSMENT — COLUMBIA-SUICIDE SEVERITY RATING SCALE - C-SSRS
3. HAVE YOU BEEN THINKING ABOUT HOW YOU MIGHT KILL YOURSELF?: NO
4. HAVE YOU HAD THESE THOUGHTS AND HAD SOME INTENTION OF ACTING ON THEM?: NO
5. HAVE YOU STARTED TO WORK OUT OR WORKED OUT THE DETAILS OF HOW TO KILL YOURSELF? DO YOU INTEND TO CARRY OUT THIS PLAN?: NO
7. DID THIS OCCUR IN THE LAST THREE MONTHS: NO

## 2023-11-07 NOTE — PROGRESS NOTES
SUBJECTIVE:  Brett Bhakta is a 59 y.o. male being evaluated for:    Chief Complaint   Patient presents with    Hypertension     Bp running 130s/80s for the past month. HPI   Blood pressure better A nose bleed last week but otherwise no problem  Felt it was due to dust at work  NO headaches or dizziness     Arms and hands go to sleep, numb currently all the time on the right hand, in left also  Needing to use CTS brace and still up every 2 hours or so as it hurts  Right shoulder hurts  Feels as if something is caught  NO trauma or falls  Neck does hurt a little bit     Depression is stable not suicidal     No Known Allergies  Current Outpatient Medications   Medication Sig Dispense Refill    dicyclomine (BENTYL) 10 MG capsule Take 1 capsule by mouth 3 times daily as needed (BLOATING) 270 capsule 0    venlafaxine (EFFEXOR XR) 75 MG extended release capsule Take 2 capsules by mouth daily 30 capsule 3    valsartan (DIOVAN) 160 MG tablet Take 1 tablet by mouth daily 90 tablet 1    cyclobenzaprine (FLEXERIL) 10 MG tablet Take 1 tablet by mouth nightly as needed for Muscle spasms 30 tablet 1    NIFEdipine (PROCARDIA XL) 60 MG extended release tablet Take 1 tablet by mouth daily 90 tablet 1    gabapentin (NEURONTIN) 300 MG capsule Take 1 capsule by mouth in the morning and at bedtime for 90 days.  Max Daily Amount: 600 mg 60 capsule 2    hydroCHLOROthiazide (HYDRODIURIL) 25 MG tablet Take 1 tablet by mouth every morning 90 tablet 1    atorvastatin (LIPITOR) 20 MG tablet TAKE 1 TABLET BY MOUTH DAILY 90 tablet 1    Biotin 1 MG CAPS Take by mouth      B Complex Vitamins (VITAMIN B COMPLEX PO) Take 1 tablet by mouth daily      Glucosamine-Chondroitin (GLUCOSAMINE CHONDR COMPLEX PO) Take by mouth Take 2-3 by mouth every morning      predniSONE (DELTASONE) 20 MG tablet 2 pills daily for 3 days, 1 pill daily for 3 days and 1/2 daily for 3 days (Patient not taking: Reported on 11/7/2023) 10 tablet 0    hydrOXYzine HCl

## 2023-11-08 DIAGNOSIS — E78.00 PURE HYPERCHOLESTEROLEMIA: ICD-10-CM

## 2023-11-08 DIAGNOSIS — R53.83 OTHER FATIGUE: ICD-10-CM

## 2023-11-08 DIAGNOSIS — G62.9 NEUROPATHY: ICD-10-CM

## 2023-11-08 LAB
CHOLEST SERPL-MCNC: 191 MG/DL (ref 0–199)
HDLC SERPL-MCNC: 53 MG/DL (ref 40–60)
LDLC SERPL CALC-MCNC: 90 MG/DL
TRIGL SERPL-MCNC: 238 MG/DL (ref 0–150)
TSH SERPL DL<=0.005 MIU/L-ACNC: 0.68 UIU/ML (ref 0.27–4.2)
VIT B12 SERPL-MCNC: 354 PG/ML (ref 211–911)
VLDLC SERPL CALC-MCNC: 48 MG/DL

## 2023-11-11 ASSESSMENT — ENCOUNTER SYMPTOMS
SHORTNESS OF BREATH: 0
DIARRHEA: 0
BLOOD IN STOOL: 0
ABDOMINAL PAIN: 0
NAUSEA: 0
VOMITING: 0
CONSTIPATION: 0
COUGH: 0

## 2023-11-20 DIAGNOSIS — M25.511 RIGHT SHOULDER PAIN, UNSPECIFIED CHRONICITY: ICD-10-CM

## 2023-11-20 RX ORDER — CYCLOBENZAPRINE HCL 10 MG
TABLET ORAL
Qty: 30 TABLET | Refills: 1 | Status: SHIPPED | OUTPATIENT
Start: 2023-11-20

## 2023-12-07 ENCOUNTER — TELEPHONE (OUTPATIENT)
Dept: FAMILY MEDICINE CLINIC | Age: 58
End: 2023-12-07

## 2023-12-07 DIAGNOSIS — G56.03 CARPAL TUNNEL SYNDROME, BILATERAL: Primary | ICD-10-CM

## 2023-12-11 SDOH — HEALTH STABILITY: PHYSICAL HEALTH: ON AVERAGE, HOW MANY MINUTES DO YOU ENGAGE IN EXERCISE AT THIS LEVEL?: 150+ MIN

## 2023-12-11 SDOH — HEALTH STABILITY: PHYSICAL HEALTH: ON AVERAGE, HOW MANY DAYS PER WEEK DO YOU ENGAGE IN MODERATE TO STRENUOUS EXERCISE (LIKE A BRISK WALK)?: 5 DAYS

## 2023-12-13 ENCOUNTER — OFFICE VISIT (OUTPATIENT)
Dept: ORTHOPEDIC SURGERY | Age: 58
End: 2023-12-13
Payer: COMMERCIAL

## 2023-12-13 VITALS — HEIGHT: 67 IN | RESPIRATION RATE: 15 BRPM | BODY MASS INDEX: 31.55 KG/M2 | WEIGHT: 201 LBS

## 2023-12-13 DIAGNOSIS — G56.03 CARPAL TUNNEL SYNDROME, BILATERAL: Primary | ICD-10-CM

## 2023-12-13 PROCEDURE — G8484 FLU IMMUNIZE NO ADMIN: HCPCS | Performed by: ORTHOPAEDIC SURGERY

## 2023-12-13 PROCEDURE — 99244 OFF/OP CNSLTJ NEW/EST MOD 40: CPT | Performed by: ORTHOPAEDIC SURGERY

## 2023-12-13 PROCEDURE — G8417 CALC BMI ABV UP PARAM F/U: HCPCS | Performed by: ORTHOPAEDIC SURGERY

## 2023-12-13 PROCEDURE — G8427 DOCREV CUR MEDS BY ELIG CLIN: HCPCS | Performed by: ORTHOPAEDIC SURGERY

## 2023-12-13 NOTE — PROGRESS NOTES
This 59 y.o., right hand dominant  is seen in consultation for Queta Keane MD with a chief complaint of numbness and tingling of the bilateral hands, R>L. Symptoms have been present for 2+ years. The patient also frequently notices pain in the hands, wrist and arms, as well as weakness of , morning stiffness and swelling as well as difficulty performing functions which require fine touch. Symptoms are frequently worse at night and can disturb sleep. The problem appears to recently have become worse. Conservative treatment has been prescribed (brace, NSAID). There is history of bilateral wrist fractures. There is history and/or family history of diabetes. There is family history of thyroid disease. There is no family history of carpal tunnel syndrome. The pain assessment has been reviewed and is correct as stated. The patient's social history, past medical history, family history, medications, allergies and review of systems, entered 12/13/23, have been reviewed, and dated and are recorded in the chart. On examination the patient is Height: 170.2 cm (5' 7\") tall and weighs Weight - Scale: 91.2 kg (201 lb). Respirations are 18 per minute. The patient is well nourished, is oriented to time and place, demonstrates appropriate mood and affect as well as normal gait and station. Cervical range of motion is normal for the patient's stated age and does not produce pain or paresthesias in either upper extremity. There is soft tissue swelling involving the volar aspect of the bilateral wrists. There is moderate right thenar muscle atrophy. The Tinel sign is positive over the median nerve at the  carpal tunnel bilaterally and negative over the ulnar nerve at the elbow bilaterally. The Phalen test is positive bilaterally at 0 seconds. There is hypalgesia, with associated dysesthesia, on sensory testing over the median nerve distribution.   Two point discrimination is normal at

## 2023-12-14 ENCOUNTER — TELEPHONE (OUTPATIENT)
Dept: ORTHOPEDIC SURGERY | Age: 58
End: 2023-12-14

## 2023-12-14 DIAGNOSIS — E78.00 PURE HYPERCHOLESTEROLEMIA: ICD-10-CM

## 2023-12-14 RX ORDER — ATORVASTATIN CALCIUM 20 MG/1
20 TABLET, FILM COATED ORAL DAILY
Qty: 90 TABLET | Refills: 1 | Status: SHIPPED | OUTPATIENT
Start: 2023-12-14

## 2023-12-14 NOTE — TELEPHONE ENCOUNTER
Auth: NPR  Date: 12/29/2023   Reference # [id number]  Spoke with: ANETTE VALDES   Type of SX: OUTPATIENT  Location: [facility]  CPT: 09395   DX: G56.02  SX area: L CARPAL  Insurance: [employer]

## 2023-12-15 NOTE — TELEPHONE ENCOUNTER
EMG results are reviewed and show severe bilateral carpal tunnel syndrome. Surgery scheduling in process.

## 2023-12-18 NOTE — PROGRESS NOTES
[address]    Day of Procedure:   12/29      Arrival time: 11               Surgery time:1230    Take the following medications with a sip of water: Follow your MD/Surgeons pre-procedure instructions regarding your medications     Do not eat or drink anything after 12:00 midnight prior to your surgery. This includes water chewing gum, mints and ice chips. You may brush your teeth and gargle the morning of your surgery, but do not swallow the water     Please see your family doctor/pediatrician for a history and physical and/or concerning medications. Bring any test results/reports from your physicians office. If you are under the care of a heart doctor or specialist doctor, please be aware that you may be asked to them for clearance    You may be asked to stop blood thinners such as Coumadin, Plavix, Fragmin, Lovenox, etc., or any anti-inflammatories such as:  Aspirin, Ibuprofen, Advil, Naproxen prior to your surgery. We also ask that you stop any OTC medications such as fish oil, vitamin E, glucosamine, garlic, Multivitamins, COQ 10, etc.    We ask that you do not smoke 24 hours prior to surgery  We ask that you do not  drink any alcoholic beverages 24 hours prior to surgery     You must make arrangements for a responsible adult to take you home after your surgery. For your safety you will not be allowed to leave alone or drive yourself home. Your surgery will be cancelled if you do not have a ride home. Also for your safety, it is strongly suggested that someone stay with you the first 24 hours after your surgery. A parent or legal guardian must accompany a child scheduled for surgery and plan to stay at the hospital until the child is discharged. Please do not bring other children with you. For your comfort, please wear simple loose fitting clothing to the hospital.  Please do not bring valuables.     Do not wear any make-up or nail polish

## 2023-12-18 NOTE — PROGRESS NOTES
[facility] Pre-Admission Testing Electronic Communication Worksheet for OR/ENDO Procedures        Patient: Monique Brooks    DOS: 12/29    Arrival Time: 11    Surgery Time:1230    Meds to Bed:  [] YES    [x]  NO    Transportation Confirmed: [x] YES    []  NO    History and Physical:  [] YES    []  NO  [] N/A  If yes, please list doctor or Urgent Care and date of H&P: see epic    Additional Clearance(Cardiac, Pulmonary, etc):  [] YES    [x]  NO    Pre-Admission Testing Visit:  [] YES    [x]  NO If no, do labs/testing need to be done DOS?   [] YES    [x]  NO    Medication Reconciliation Complete:  [x] YES    []  NO        Additional Notes:                Interview Complete: [x] YES    []  NO

## 2023-12-18 NOTE — PROGRESS NOTES
Follow Up Prior to Surgery    DOS: 23  :1965      History and Physical:    Pt will call Ariela Cruz for HP    UPDATE: Pre-Op H&P scheduled  @ 3822 Complete and cleared.

## 2023-12-26 ENCOUNTER — OFFICE VISIT (OUTPATIENT)
Dept: FAMILY MEDICINE CLINIC | Age: 58
End: 2023-12-26
Payer: COMMERCIAL

## 2023-12-26 VITALS
WEIGHT: 202 LBS | DIASTOLIC BLOOD PRESSURE: 78 MMHG | SYSTOLIC BLOOD PRESSURE: 140 MMHG | BODY MASS INDEX: 31.71 KG/M2 | OXYGEN SATURATION: 95 % | HEIGHT: 67 IN | TEMPERATURE: 97.7 F | HEART RATE: 67 BPM

## 2023-12-26 DIAGNOSIS — I10 PRIMARY HYPERTENSION: ICD-10-CM

## 2023-12-26 DIAGNOSIS — Z01.818 PREOP EXAMINATION: Primary | ICD-10-CM

## 2023-12-26 DIAGNOSIS — E78.00 PURE HYPERCHOLESTEROLEMIA: ICD-10-CM

## 2023-12-26 DIAGNOSIS — G56.03 CARPAL TUNNEL SYNDROME, BILATERAL: ICD-10-CM

## 2023-12-26 PROBLEM — I26.99 ACUTE PULMONARY EMBOLISM WITHOUT ACUTE COR PULMONALE (HCC): Status: RESOLVED | Noted: 2022-12-10 | Resolved: 2023-12-26

## 2023-12-26 PROBLEM — I82.409 DEEP VENOUS THROMBOSIS (HCC): Status: RESOLVED | Noted: 2022-12-10 | Resolved: 2023-12-26

## 2023-12-26 PROCEDURE — G8484 FLU IMMUNIZE NO ADMIN: HCPCS | Performed by: NURSE PRACTITIONER

## 2023-12-26 PROCEDURE — 3077F SYST BP >= 140 MM HG: CPT | Performed by: NURSE PRACTITIONER

## 2023-12-26 PROCEDURE — G8427 DOCREV CUR MEDS BY ELIG CLIN: HCPCS | Performed by: NURSE PRACTITIONER

## 2023-12-26 PROCEDURE — 4004F PT TOBACCO SCREEN RCVD TLK: CPT | Performed by: NURSE PRACTITIONER

## 2023-12-26 PROCEDURE — G8417 CALC BMI ABV UP PARAM F/U: HCPCS | Performed by: NURSE PRACTITIONER

## 2023-12-26 PROCEDURE — 3017F COLORECTAL CA SCREEN DOC REV: CPT | Performed by: NURSE PRACTITIONER

## 2023-12-26 PROCEDURE — 3078F DIAST BP <80 MM HG: CPT | Performed by: NURSE PRACTITIONER

## 2023-12-26 PROCEDURE — 99214 OFFICE O/P EST MOD 30 MIN: CPT | Performed by: NURSE PRACTITIONER

## 2023-12-26 NOTE — PROGRESS NOTES
11/08/2023   Component Date Value Ref Range Status    Vitamin B-12 11/06/2023 354  211 - 911 pg/mL Final    TSH 11/06/2023 0.68  0.27 - 4.20 uIU/mL Final    Cholesterol, Total 11/06/2023 191  0 - 199 mg/dL Final    Triglycerides 11/06/2023 238 (H)  0 - 150 mg/dL Final    HDL 11/06/2023 53  40 - 60 mg/dL Final    Comment: An HDL cholesterol less than 40 mg/dL is low and  constitutes a coronary heart disease risk factor. An HDL cholesterol greater than 60 mg/dL is a  negative risk factor for coronary heart disease. LDL Calculated 11/06/2023 90  <100 mg/dL Final    VLDL Cholesterol Calculated 11/06/2023 48  Not Established mg/dL Final          Assessment & Plan   1. Preop examination  Pt cleared for surgery    2. Carpal tunnel syndrome, bilateral  Worsening. Pt ready for surgical intervention. 3. Primary hypertension  Stable. Pt has not taken HTN meds today. No change to medications today. 4. Pure hypercholesterolemia  Stable.  LDL 90, HDL 53         Electronically signed by ROXANA Mo NP on 12/26/23 at 3:43 PM EST

## 2023-12-28 ENCOUNTER — ANESTHESIA EVENT (OUTPATIENT)
Dept: OPERATING ROOM | Age: 58
End: 2023-12-28
Payer: COMMERCIAL

## 2023-12-29 ENCOUNTER — HOSPITAL ENCOUNTER (OUTPATIENT)
Age: 58
Setting detail: OUTPATIENT SURGERY
Discharge: HOME | End: 2023-12-29
Attending: ORTHOPAEDIC SURGERY | Admitting: ORTHOPAEDIC SURGERY
Payer: COMMERCIAL

## 2023-12-29 ENCOUNTER — ANESTHESIA (OUTPATIENT)
Dept: OPERATING ROOM | Age: 58
End: 2023-12-29
Payer: COMMERCIAL

## 2023-12-29 VITALS
DIASTOLIC BLOOD PRESSURE: 71 MMHG | BODY MASS INDEX: 31.39 KG/M2 | SYSTOLIC BLOOD PRESSURE: 121 MMHG | OXYGEN SATURATION: 98 % | WEIGHT: 200 LBS | TEMPERATURE: 97.6 F | RESPIRATION RATE: 18 BRPM | HEART RATE: 53 BPM | HEIGHT: 67 IN

## 2023-12-29 PROBLEM — G56.02 CARPAL TUNNEL SYNDROME ON LEFT: Status: ACTIVE | Noted: 2023-12-13

## 2023-12-29 PROCEDURE — 3600000005 HC SURGERY LEVEL 5 BASE: Performed by: ORTHOPAEDIC SURGERY

## 2023-12-29 PROCEDURE — 2709999900 HC NON-CHARGEABLE SUPPLY: Performed by: ORTHOPAEDIC SURGERY

## 2023-12-29 PROCEDURE — 6360000002 HC RX W HCPCS: Performed by: ORTHOPAEDIC SURGERY

## 2023-12-29 PROCEDURE — 64721 CARPAL TUNNEL SURGERY: CPT | Performed by: ORTHOPAEDIC SURGERY

## 2023-12-29 PROCEDURE — 3600000015 HC SURGERY LEVEL 5 ADDTL 15MIN: Performed by: ORTHOPAEDIC SURGERY

## 2023-12-29 PROCEDURE — 7100000010 HC PHASE II RECOVERY - FIRST 15 MIN: Performed by: ORTHOPAEDIC SURGERY

## 2023-12-29 PROCEDURE — 7100000001 HC PACU RECOVERY - ADDTL 15 MIN: Performed by: ORTHOPAEDIC SURGERY

## 2023-12-29 PROCEDURE — 7100000011 HC PHASE II RECOVERY - ADDTL 15 MIN: Performed by: ORTHOPAEDIC SURGERY

## 2023-12-29 PROCEDURE — 2500000003 HC RX 250 WO HCPCS

## 2023-12-29 PROCEDURE — 2580000003 HC RX 258: Performed by: ANESTHESIOLOGY

## 2023-12-29 PROCEDURE — 3700000000 HC ANESTHESIA ATTENDED CARE: Performed by: ORTHOPAEDIC SURGERY

## 2023-12-29 PROCEDURE — A4217 STERILE WATER/SALINE, 500 ML: HCPCS | Performed by: ORTHOPAEDIC SURGERY

## 2023-12-29 PROCEDURE — 7100000000 HC PACU RECOVERY - FIRST 15 MIN: Performed by: ORTHOPAEDIC SURGERY

## 2023-12-29 PROCEDURE — 2500000003 HC RX 250 WO HCPCS: Performed by: ORTHOPAEDIC SURGERY

## 2023-12-29 PROCEDURE — 3700000001 HC ADD 15 MINUTES (ANESTHESIA): Performed by: ORTHOPAEDIC SURGERY

## 2023-12-29 PROCEDURE — 6360000002 HC RX W HCPCS

## 2023-12-29 PROCEDURE — 2580000003 HC RX 258: Performed by: ORTHOPAEDIC SURGERY

## 2023-12-29 RX ORDER — PROPOFOL 10 MG/ML
INJECTION, EMULSION INTRAVENOUS PRN
Status: DISCONTINUED | OUTPATIENT
Start: 2023-12-29 | End: 2023-12-29 | Stop reason: SDUPTHER

## 2023-12-29 RX ORDER — SODIUM CHLORIDE 0.9 % (FLUSH) 0.9 %
5-40 SYRINGE (ML) INJECTION EVERY 12 HOURS SCHEDULED
Status: DISCONTINUED | OUTPATIENT
Start: 2023-12-29 | End: 2023-12-29 | Stop reason: HOSPADM

## 2023-12-29 RX ORDER — SODIUM CHLORIDE 9 MG/ML
INJECTION, SOLUTION INTRAVENOUS PRN
Status: DISCONTINUED | OUTPATIENT
Start: 2023-12-29 | End: 2023-12-29 | Stop reason: HOSPADM

## 2023-12-29 RX ORDER — SODIUM CHLORIDE 0.9 % (FLUSH) 0.9 %
5-40 SYRINGE (ML) INJECTION PRN
Status: DISCONTINUED | OUTPATIENT
Start: 2023-12-29 | End: 2023-12-29 | Stop reason: HOSPADM

## 2023-12-29 RX ORDER — MEPERIDINE HYDROCHLORIDE 25 MG/ML
12.5 INJECTION INTRAMUSCULAR; INTRAVENOUS; SUBCUTANEOUS EVERY 5 MIN PRN
Status: DISCONTINUED | OUTPATIENT
Start: 2023-12-29 | End: 2023-12-29 | Stop reason: HOSPADM

## 2023-12-29 RX ORDER — LIDOCAINE HYDROCHLORIDE 20 MG/ML
INJECTION, SOLUTION EPIDURAL; INFILTRATION; INTRACAUDAL; PERINEURAL PRN
Status: DISCONTINUED | OUTPATIENT
Start: 2023-12-29 | End: 2023-12-29 | Stop reason: SDUPTHER

## 2023-12-29 RX ORDER — LABETALOL HYDROCHLORIDE 5 MG/ML
5 INJECTION, SOLUTION INTRAVENOUS
Status: DISCONTINUED | OUTPATIENT
Start: 2023-12-29 | End: 2023-12-29 | Stop reason: HOSPADM

## 2023-12-29 RX ORDER — MAGNESIUM HYDROXIDE 1200 MG/15ML
LIQUID ORAL CONTINUOUS PRN
Status: DISCONTINUED | OUTPATIENT
Start: 2023-12-29 | End: 2023-12-29 | Stop reason: HOSPADM

## 2023-12-29 RX ORDER — DIPHENHYDRAMINE HYDROCHLORIDE 50 MG/ML
12.5 INJECTION INTRAMUSCULAR; INTRAVENOUS
Status: DISCONTINUED | OUTPATIENT
Start: 2023-12-29 | End: 2023-12-29 | Stop reason: HOSPADM

## 2023-12-29 RX ORDER — ONDANSETRON 2 MG/ML
4 INJECTION INTRAMUSCULAR; INTRAVENOUS
Status: DISCONTINUED | OUTPATIENT
Start: 2023-12-29 | End: 2023-12-29 | Stop reason: HOSPADM

## 2023-12-29 RX ORDER — FENTANYL CITRATE 0.05 MG/ML
25 INJECTION, SOLUTION INTRAMUSCULAR; INTRAVENOUS EVERY 5 MIN PRN
Status: DISCONTINUED | OUTPATIENT
Start: 2023-12-29 | End: 2023-12-29 | Stop reason: HOSPADM

## 2023-12-29 RX ADMIN — SODIUM CHLORIDE: 9 INJECTION, SOLUTION INTRAVENOUS at 09:40

## 2023-12-29 RX ADMIN — PROPOFOL 150 MG: 10 INJECTION, EMULSION INTRAVENOUS at 11:08

## 2023-12-29 RX ADMIN — LIDOCAINE HYDROCHLORIDE 80 MG: 20 INJECTION, SOLUTION EPIDURAL; INFILTRATION; INTRACAUDAL; PERINEURAL at 11:08

## 2023-12-29 RX ADMIN — PROPOFOL 250 MCG/KG/MIN: 10 INJECTION, EMULSION INTRAVENOUS at 11:09

## 2023-12-29 NOTE — ANESTHESIA POSTPROCEDURE EVALUATION
Department of Anesthesiology  Postprocedure Note    Patient: Juan Francisco Ho  MRN: [id number]  [address] [place]: 1965  Date of evaluation: 12/29/2023    Procedure Summary       Date: 12/29/23 Room / Location: [address]    Anesthesia Start: 1104 Anesthesia Stop: 1117    Procedure: LEFT CARPAL TUNNEL RELEASE (Left: Wrist) Diagnosis:       Carpal tunnel syndrome on left      (Carpal tunnel syndrome on left [G56.02])    Surgeons: Nikki Breen MD Responsible Provider: Adrienne Lyon MD    Anesthesia Type: MAC ASA Status: 2            Anesthesia Type: MAC    Fran Phase I: Fran Score: 7    Fran Phase II:      Anesthesia Post Evaluation    Patient location during evaluation: PACU  Patient participation: complete - patient participated  Level of consciousness: awake  Airway patency: patent  Nausea & Vomiting: no nausea  Cardiovascular status: hemodynamically stable  Respiratory status: acceptable  Hydration status: euvolemic  Multimodal analgesia pain management approach    No notable events documented.

## 2023-12-29 NOTE — OP NOTE
longitudinal thenar crease. Dissection was carried carefully through the subcutaneous tissue identifying and protecting the neurovascular structures. The palmar fascia was incised longitudinally, exposing the transverse carpal ligament. The transverse carpal ligament was incised from its proximal to distal most extent, under direct visualization. The terminal 2 cm of antebrachial fascia was similarly incised under direct visualization. The contents of the carpal tunnel were inspected and found to be free of mass, lesion or other abnormality. Digital palpation revealed no further constriction about the median nerve. The wound was irrigated copiously with sterile saline for irrigation and the pneumotourniquet was deflated after a period of 2 minutes elevation. The fingers were immediately pink & well perfused. Hemostasis was easily obtained with direct pressure and electrocautery and the wound was closed with interrupted sutures. The wound was dressed with adaptic, dry sterile dressings and a bulky soft hand & wrist dressing was applied. Mr. Ria Quan  was awakened from light sedation, having tolerated the procedure without apparent complication. He  was returned to the recovery room in stable condition. At the conclusion of the procedure all needle, instrument, and sponge counts were correct. Walter Calero MD   12/29/2023, 11:06 AM

## 2023-12-29 NOTE — ANESTHESIA PRE PROCEDURE
Department of Anesthesiology  Preprocedure Note       Name:  Carolina Muller   Age:  59 y.o.  :  1965                                          MRN:  [id number]         Date:  2023      Surgeon: Shari Guerrero): Gia Franks MD    Procedure: Procedure(s):  LEFT CARPAL TUNNEL RELEASE    Medications prior to admission:   Prior to Admission medications    Medication Sig Start Date End Date Taking? Authorizing Provider   atorvastatin (LIPITOR) 20 MG tablet TAKE 1 TABLET BY MOUTH DAILY 23   Viv Kaminski APRN - NP   cyclobenzaprine (FLEXERIL) 10 MG tablet TAKE 1 TABLET BY MOUTH EVERY NIGHT AS NEEDED FOR MUSCLE SPASMS 23   Viv Kaminski APRN - NP   dicyclomine (BENTYL) 10 MG capsule Take 1 capsule by mouth 3 times daily as needed (BLOATING) 10/19/23   Barak Guerrero MD   venlafaxine (EFFEXOR XR) 75 MG extended release capsule Take 2 capsules by mouth daily 23   Barak Guerrero MD   valsartan (DIOVAN) 160 MG tablet Take 1 tablet by mouth daily 23   Barak Guerrero MD   NIFEdipine (PROCARDIA XL) 60 MG extended release tablet Take 1 tablet by mouth daily 23   Barak Guerrero MD   gabapentin (NEURONTIN) 300 MG capsule Take 1 capsule by mouth in the morning and at bedtime for 90 days. Max Daily Amount: 600 mg  Patient taking differently: Take 1 capsule by mouth once.  23  Barak Guerrero MD   hydroCHLOROthiazide (HYDRODIURIL) 25 MG tablet Take 1 tablet by mouth every morning 23   Barak Guerrero MD   hydrOXYzine HCl (ATARAX) 25 MG tablet TAKE 1 TABLET BY MOUTH TWICE DAILY AS NEEDED FOR ANXIETY OR SLEEP 23   Barak Guerrero MD   Biotin 1 MG CAPS Take by mouth    Davis Lacey MD   B Complex Vitamins (VITAMIN B COMPLEX PO) Take 1 tablet by mouth daily    Davis Lacey MD   Glucosamine-Chondroitin (GLUCOSAMINE CHONDR COMPLEX PO) Take by mouth Take 2-3 by mouth every morning    Davis Lacey MD       Current medications:

## 2023-12-29 NOTE — PROGRESS NOTES
Patient up to chair with no complications. Patient has no complaints of dizziness at this time. Patient's visitor verbalized understanding of discharge instructions. They have no questions at this time.

## 2023-12-29 NOTE — DISCHARGE INSTRUCTIONS
Post-Operative Instructions    Carpal Tunnel Release:    Keep hand elevated with fingers above eye-level to control swelling. Keep hand and bandage clean and dry. Do not change or unwrap bandage. Please leave bandage in place until your follow-up appointment. Maintain finger motion by fully straightening and fully bending fingers and thumb at least once an hour (while awake). This may cause some discomfort, but will not damage surgery. You may use your operated hand for lightweight tasks (e.g. writing, eating, dressing, etc.). NO LIFTING, CARRYING OR HEAVY USE. Most Patients do not have \"Serious Pain\" after this procedure and thus most patients do not require prescription pain medication. You may take over the counter medication (Tylenol, Advil, Aleve, etc.) as needed. If you are unable to tolerate the discomfort after your surgery and the OTC medications do not provide some relief, you may contact our office to discuss other options. .    Please call the office at [phone]  in 24 - 48 hours to schedule a follow up appointment for approximately 7 - 10 days after surgery. Please call the office at [phone]  if you have any questions or problems. Akash León MD

## 2023-12-29 NOTE — H&P
Pre-operative Update of H&P:    I  have seen & examined Mr. Zofia Scott related solely to his hand and upper extremity conditions, prior to the scheduled procedure on the date of his surgery. The indications for the planned surgical procedure & and his upper-extremity condition are unchanged. Examination for Stenosing Tenosynovitis demonstrates mild tenderness, thickening & nodularity at the A-1 pulley(s) of the Left Middle Finger and Ring Finger. There is a palpable Nota's Node. There is Active triggering on active flexion with pain. No other digits demonstrate evidence of Stenosing Tenosynovitis. I have had a thorough discussion with Mr. Zofia Scott regarding the treatment options available for his initially presenting left  Middle Finger and Ring Finger stenosing tenosynovitis, which is causing him significant symptoms and difficulty. I have outlined for Mr. Zofia Scott the risk, benefits and consequences of the various treatment modalities, including a reasonable expectation for the long term success of each. We have discussed the likelihood that further, more aggressive treatment may be required for his current presenting condition. Based upon our current discussion and a reasonable understating of the options available to him, Mr. Zofia Scott has selected to proceed with a conservative plan of treatment consisting of: activity modification, and the judicious use of over-the-counter anti-inflammatory medications if allowed by his primary care physician. Instructions were given regarding conservative treatments as well as suggestions for use of the other modalities were discussed. I have clearly explained to him that the above outlined treatment plan should not be expected to 'cure' his  stenosing tenosynovitis, but we are rather treating the symptoms with which he presents.   He has understood that in order to achieve more durable relief of his symptoms and to prevent future worsening

## 2024-01-03 ENCOUNTER — OFFICE VISIT (OUTPATIENT)
Dept: ORTHOPEDIC SURGERY | Age: 59
End: 2024-01-03

## 2024-01-03 VITALS — RESPIRATION RATE: 16 BRPM | WEIGHT: 202 LBS | BODY MASS INDEX: 31.71 KG/M2 | HEIGHT: 67 IN

## 2024-01-03 DIAGNOSIS — Z98.890 STATUS POST CARPAL TUNNEL RELEASE: Primary | ICD-10-CM

## 2024-01-03 PROCEDURE — 99024 POSTOP FOLLOW-UP VISIT: CPT | Performed by: ORTHOPAEDIC SURGERY

## 2024-01-03 NOTE — PATIENT INSTRUCTIONS
Postoperative Instructions After Carpal Tunnel Release    Dr. Abhishek Gonzales        After bandages are removed one week from surgery, you may chose to wear a small bandage over the incision if you wish, though you do not need to.  Keep incision dry until sutures have fully dissolved  or it has been 12 - 14 days since your surgery. Thereafter, you may wash with mild soap and water and shower normally.    Work hard on motion of the fingers and wrist, straightening each finger fully and bending each finger fully, bending wrist forward and bending wrist backwards. Do not be concerned if you experience discomfort.  This will not damage the surgery.  You may begin using the hand as it feels comfortable beginning 12 - 14 days from the day of surgery. You may not feel entirely comfortable gripping or lifting heavy objects for several weeks.  You may expect to see some skin “peel” off around the incision.  You may be left with a small area of “pink baby skin”. This is quite normal.  6. Once your stiches have fully disappeared & skin appears normal, you should begin gently massaging the incision with Vitamin E (may use Vitamin E lotion or contents of Vitamin E capsule).      Thank you for choosing [facility] for your Hand and Upper Extremity needs.  If we can be of any further assistance to you, please do not hesitate to contact us.    Office Phone Number:  [phone]  or  [phone]

## 2024-01-03 NOTE — PROGRESS NOTES
Mr. Christian Crandall returns today in follow-up of his recent left Carpal Tunnel Release done approximately 6 days ago.  He has done well noting mild discomfort and no other reported complications.    He notes pre-operative symptoms to be improved at this time.    Physical Exam:  Skin incision is healing well, without erythema, drainage or sign of infection.  Digital range of motion is without significant limitation.  Wrist range of motion is without significant limitation.  Sensation is improved from preoperatvely  Vascular examination reveals normal, good capillary refill, and good color.  Swelling is minimal.  Sensory and Motor Median Nerve function is intact.    Impression:  Mr. Christian Crandall is doing well after recent left Carpal Tunnel Release.    Plan:  Mr. Christian Crandall is instructed in work on Active & Passive range of motion of the digits, wrist, & elbow.  These modalities were specifically demonstrated to him today.  We discussed the appropriateness of gradual resumption of use of the operated hand and the return to normal use as comfort allows.  He is given instructions regarding management of the fresh surgical incision and progressive use of desensitization and tissue massage techniques.  We discussed the appropriate expectations and timeline for symptom improvement.    He is provided a written patient instruction sheet titled: Postoperative Instructions After Carpal Tunnel Release.    I have asked Mr. Christian Crandall to follow-up with me or contact me by telephone over the next 2-4 weeks if his symptoms have not fully resolved or if he has not regained full & painless return of function.      He is also specifically instructed to return to the office or call for an appointment sooner if his symptoms are changing or worsening prior to that time.

## 2024-01-05 ENCOUNTER — TELEPHONE (OUTPATIENT)
Dept: ORTHOPEDIC SURGERY | Age: 59
End: 2024-01-05

## 2024-01-05 NOTE — TELEPHONE ENCOUNTER
Auth: NPR  Date: 1/18/2024  Reference # [id number]  Spoke with: ASAD FAJARDO  Type of SX: OUTPATIENT  Location: [facility]  CPT: 68444   DX: G56.01  SX area: R CARPAL  Insurance: [employer]

## 2024-01-08 ENCOUNTER — TELEPHONE (OUTPATIENT)
Dept: ORTHOPEDIC SURGERY | Age: 59
End: 2024-01-08

## 2024-01-08 NOTE — TELEPHONE ENCOUNTER
Spoke with the patient she was returning Kirill's call about having surgery on 1/11/24.  Routing message to Kirill.

## 2024-01-08 NOTE — TELEPHONE ENCOUNTER
General Question     Subject: SX SCHEDULE/ REQ A CALL BACK   Patient and /or Facility Request: Christian Crandall  Contact Number: [phone]      MATILDE CALLED IN TO SEE IF SHE CAN SPEAK TO SOMEONE IN THE OFFICE ABOUT AN SX FOR 1-11-24 FOR THE PATIENT.. REQ A CALL BACK..    PLEASE ADVISE

## 2024-01-08 NOTE — TELEPHONE ENCOUNTER
General Question     Subject: SX SHASHA  Patient and /or Facility Request: Marco Antonio Crandall  Contact Number: [phone]    WIFE CLLED TO REQ CLL BK FROM YOANNA ABOUT SX [facility]  PLEASE CLL HER AT N NUMBER LISTED

## 2024-01-17 ENCOUNTER — ANESTHESIA EVENT (OUTPATIENT)
Dept: OPERATING ROOM | Age: 59
End: 2024-01-17
Payer: COMMERCIAL

## 2024-01-18 ENCOUNTER — HOSPITAL ENCOUNTER (OUTPATIENT)
Age: 59
Setting detail: OUTPATIENT SURGERY
Discharge: HOME | End: 2024-01-18
Attending: ORTHOPAEDIC SURGERY | Admitting: ORTHOPAEDIC SURGERY
Payer: COMMERCIAL

## 2024-01-18 ENCOUNTER — ANESTHESIA (OUTPATIENT)
Dept: OPERATING ROOM | Age: 59
End: 2024-01-18
Payer: COMMERCIAL

## 2024-01-18 VITALS
TEMPERATURE: 97.2 F | BODY MASS INDEX: 31.38 KG/M2 | RESPIRATION RATE: 18 BRPM | OXYGEN SATURATION: 98 % | WEIGHT: 199.96 LBS | HEART RATE: 59 BPM | HEIGHT: 67 IN | DIASTOLIC BLOOD PRESSURE: 92 MMHG | SYSTOLIC BLOOD PRESSURE: 131 MMHG

## 2024-01-18 PROBLEM — G56.01 CARPAL TUNNEL SYNDROME ON RIGHT: Status: ACTIVE | Noted: 2024-01-18

## 2024-01-18 PROCEDURE — 7100000011 HC PHASE II RECOVERY - ADDTL 15 MIN: Performed by: ORTHOPAEDIC SURGERY

## 2024-01-18 PROCEDURE — 2500000003 HC RX 250 WO HCPCS: Performed by: ORTHOPAEDIC SURGERY

## 2024-01-18 PROCEDURE — 3700000000 HC ANESTHESIA ATTENDED CARE: Performed by: ORTHOPAEDIC SURGERY

## 2024-01-18 PROCEDURE — 3600000005 HC SURGERY LEVEL 5 BASE: Performed by: ORTHOPAEDIC SURGERY

## 2024-01-18 PROCEDURE — 7100000000 HC PACU RECOVERY - FIRST 15 MIN: Performed by: ORTHOPAEDIC SURGERY

## 2024-01-18 PROCEDURE — 2709999900 HC NON-CHARGEABLE SUPPLY: Performed by: ORTHOPAEDIC SURGERY

## 2024-01-18 PROCEDURE — 7100000001 HC PACU RECOVERY - ADDTL 15 MIN: Performed by: ORTHOPAEDIC SURGERY

## 2024-01-18 PROCEDURE — 6360000002 HC RX W HCPCS: Performed by: ORTHOPAEDIC SURGERY

## 2024-01-18 PROCEDURE — A4217 STERILE WATER/SALINE, 500 ML: HCPCS | Performed by: ORTHOPAEDIC SURGERY

## 2024-01-18 PROCEDURE — 6360000002 HC RX W HCPCS

## 2024-01-18 PROCEDURE — 2580000003 HC RX 258: Performed by: ANESTHESIOLOGY

## 2024-01-18 PROCEDURE — 7100000010 HC PHASE II RECOVERY - FIRST 15 MIN: Performed by: ORTHOPAEDIC SURGERY

## 2024-01-18 PROCEDURE — 2500000003 HC RX 250 WO HCPCS

## 2024-01-18 PROCEDURE — 2580000003 HC RX 258: Performed by: ORTHOPAEDIC SURGERY

## 2024-01-18 RX ORDER — FENTANYL CITRATE 0.05 MG/ML
25 INJECTION, SOLUTION INTRAMUSCULAR; INTRAVENOUS EVERY 5 MIN PRN
Status: DISCONTINUED | OUTPATIENT
Start: 2024-01-18 | End: 2024-01-18 | Stop reason: HOSPADM

## 2024-01-18 RX ORDER — LIDOCAINE HYDROCHLORIDE 20 MG/ML
INJECTION, SOLUTION EPIDURAL; INFILTRATION; INTRACAUDAL; PERINEURAL PRN
Status: DISCONTINUED | OUTPATIENT
Start: 2024-01-18 | End: 2024-01-18 | Stop reason: SDUPTHER

## 2024-01-18 RX ORDER — MAGNESIUM HYDROXIDE 1200 MG/15ML
LIQUID ORAL CONTINUOUS PRN
Status: DISCONTINUED | OUTPATIENT
Start: 2024-01-18 | End: 2024-01-18 | Stop reason: HOSPADM

## 2024-01-18 RX ORDER — SODIUM CHLORIDE 0.9 % (FLUSH) 0.9 %
5-40 SYRINGE (ML) INJECTION PRN
Status: DISCONTINUED | OUTPATIENT
Start: 2024-01-18 | End: 2024-01-18 | Stop reason: HOSPADM

## 2024-01-18 RX ORDER — PROPOFOL 10 MG/ML
INJECTION, EMULSION INTRAVENOUS PRN
Status: DISCONTINUED | OUTPATIENT
Start: 2024-01-18 | End: 2024-01-18 | Stop reason: SDUPTHER

## 2024-01-18 RX ORDER — ONDANSETRON 2 MG/ML
4 INJECTION INTRAMUSCULAR; INTRAVENOUS
Status: DISCONTINUED | OUTPATIENT
Start: 2024-01-18 | End: 2024-01-18 | Stop reason: HOSPADM

## 2024-01-18 RX ORDER — SODIUM CHLORIDE 0.9 % (FLUSH) 0.9 %
5-40 SYRINGE (ML) INJECTION EVERY 12 HOURS SCHEDULED
Status: DISCONTINUED | OUTPATIENT
Start: 2024-01-18 | End: 2024-01-18 | Stop reason: HOSPADM

## 2024-01-18 RX ORDER — SODIUM CHLORIDE 9 MG/ML
INJECTION, SOLUTION INTRAVENOUS PRN
Status: DISCONTINUED | OUTPATIENT
Start: 2024-01-18 | End: 2024-01-18 | Stop reason: HOSPADM

## 2024-01-18 RX ADMIN — SODIUM CHLORIDE: 9 INJECTION, SOLUTION INTRAVENOUS at 11:02

## 2024-01-18 RX ADMIN — PROPOFOL 200 MCG/KG/MIN: 10 INJECTION, EMULSION INTRAVENOUS at 12:18

## 2024-01-18 RX ADMIN — PROPOFOL 100 MG: 10 INJECTION, EMULSION INTRAVENOUS at 12:17

## 2024-01-18 RX ADMIN — LIDOCAINE HYDROCHLORIDE 80 MG: 20 INJECTION, SOLUTION EPIDURAL; INFILTRATION; INTRACAUDAL; PERINEURAL at 12:17

## 2024-01-18 ASSESSMENT — PAIN DESCRIPTION - DESCRIPTORS: DESCRIPTORS: PINS AND NEEDLES

## 2024-01-18 ASSESSMENT — PAIN SCALES - GENERAL
PAINLEVEL_OUTOF10: 0

## 2024-01-18 ASSESSMENT — PAIN - FUNCTIONAL ASSESSMENT
PAIN_FUNCTIONAL_ASSESSMENT: 0-10
PAIN_FUNCTIONAL_ASSESSMENT: PREVENTS OR INTERFERES SOME ACTIVE ACTIVITIES AND ADLS

## 2024-01-18 ASSESSMENT — PAIN SCALES - WONG BAKER
WONGBAKER_NUMERICALRESPONSE: 0

## 2024-01-18 NOTE — OP NOTE
OPERATIVE REPORT          Patient:  Christian Crandall    YOB: 1965  Date of Service:  1/18/2024   Location:  [facility] Main OR    Preoperative Diagnosis:    Right carpal tunnel syndrome    Postoperative Diagnosis:    Same    Procedure:    Right carpal tunnel release      Surgeon:    Abhishek Gonzales MD    Surgical Assistant:    Amy Eid PA-C    Anesthesia:   Local with Sedation    Blood Loss:   Minimal    Complications:  None    Tourniquet Time: 2 minutes     Indications:  Mr. Christian Crandall  is a 58 y.o. year-old male with Right carpal tunnel syndrome. I have discussed preoperatively with him  the complications, limitations, expectations, alternatives and risks of surgical care, which he has understood.  All of his questions have been fully answered, and he has provided written informed consent to proceed.    Procedure:   After written consent was obtained and the proper operative site was identified and marked, Mr. Christian Crandall was brought to the operating room, placed in the supine position on the operating room table with the Right arm extended upon a hand table.  Under an appropriate level of sedation, local anesthetic (1% Lidocaine and 1/2% Marcaine both without Epinephrine) was instilled in the planned surgical field. His Right upper extremity was prepped and draped in the usual sterile fashion.     After Esmarch exsanguination, the pneumotourniquet was inflated to 250 mm of mercury.   A 2 cm longitudinal incision was fashioned at the base of the palm, paralleling the longitudinal thenar crease. Dissection was carried carefully through the subcutaneous tissue identifying and protecting the neurovascular structures. The palmar fascia was incised longitudinally, exposing the transverse carpal ligament. The transverse carpal ligament was incised from its proximal to distal most extent, under direct visualization. The terminal 2 cm of antebrachial fascia was similarly incised under

## 2024-01-18 NOTE — ANESTHESIA POSTPROCEDURE EVALUATION
[facility] Department of Anesthesiology  Post-Anesthesia Note       Name:  Christian Crandall                                  Age:  58 y.o.  MRN:  [id number]     Last Vitals & Oxygen Saturation: BP (!) 131/92   Pulse 59   Temp 97.2 °F (36.2 °C)   Resp 18   Ht 1.702 m (5' 7\")   Wt 90.7 kg (199 lb 15.3 oz)   SpO2 98%   BMI 31.32 kg/m²   Patient Vitals for the past 4 hrs:   BP Temp Temp src Pulse Resp SpO2 Weight   01/18/24 1258 (!) 131/92 97.2 °F (36.2 °C) -- 59 18 98 % --   01/18/24 1249 130/78 -- -- 58 23 96 % --   01/18/24 1245 122/81 -- -- (!) 49 16 97 % --   01/18/24 1240 105/60 -- -- 60 18 98 % --   01/18/24 1235 120/80 -- -- 71 15 98 % --   01/18/24 1232 -- -- -- 71 -- -- --   01/18/24 1231 116/78 97.1 °F (36.2 °C) Oral 73 15 97 % --   01/18/24 1059 133/80 97 °F (36.1 °C) -- 63 18 97 % --   01/18/24 1051 -- -- -- -- -- -- 90.7 kg (199 lb 15.3 oz)       Level of consciousness:  Awake, alert    Respiratory: Respirations easy, no distress. Stable.    Cardiovascular: Hemodynamically stable.    Hydration: Adequate.    PONV: Adequately managed.    Post-op pain: Adequately controlled.    Post-op assessment: Tolerated anesthetic well without complication.    Complications:  None.    Danie Knox MD  January 18, 2024   12:58 PM

## 2024-01-18 NOTE — DISCHARGE INSTRUCTIONS
Post-Operative Instructions    Carpal Tunnel Release:    Keep hand elevated with fingers above eye-level to control swelling.  Keep hand and bandage clean and dry.  Do not change or unwrap bandage.  Please leave bandage in place until your follow-up appointment.  Maintain finger motion by fully straightening and fully bending fingers and thumb at least once an hour (while awake).  This may cause some discomfort, but will not damage surgery.  You may use your operated hand for lightweight tasks (e.g. writing, eating, dressing, etc.).  NO LIFTING, CARRYING OR HEAVY USE.    Most Patients do not have \"Serious Pain\" after this procedure and thus most patients do not require prescription pain medication.  You may take over the counter medication (Tylenol, Advil, Aleve, etc.) as needed.  If you are unable to tolerate the discomfort after your surgery and the OTC medications do not provide some relief, you may contact our office to discuss other options..    Please call the office at [phone]  in 24 - 48 hours to schedule a follow up appointment for approximately 7 - 10 days after surgery.  Please call the office at [phone]  if you have any questions or problems.           Abhishek Gonzales MD

## 2024-01-18 NOTE — ANESTHESIA PRE PROCEDURE
[facility] Department of Anesthesiology  Pre-Anesthesia Evaluation/Consultation       Name:  Christian Crandall  : 1965  Age:  58 y.o.                                           MRN:  [id number]  Date: 2024           Surgeon: Surgeon(s):  Abhishek Gonzales MD    Procedure: Procedure(s):  RIGHT CARPAL TUNNEL RELEASE     No Known Allergies  Patient Active Problem List   Diagnosis    Chronic pain of right knee    Chronic pain of right ankle    Pure hypercholesterolemia    Anxiety    Syncope    HTN (hypertension)    Syncope and collapse    Arteriovenous fistula (HCC)    Accidental drug overdose    Fever and chills    Hematoma    Carpal tunnel syndrome on left     Past Medical History:   Diagnosis Date    Acute pulmonary embolism without acute cor pulmonale (HCC) 12/10/2022    Anxiety     Deep venous thrombosis (HCC) 12/10/2022    Depression     Drug abuse (HCC)     DVT (deep venous thrombosis) ([facility])     Hyperlipidemia     Hypertension     borderline     Irritable bowel syndrome     Osteoarthritis     knees    Torn ACL     left, right    Torn meniscus     left, right     Past Surgical History:   Procedure Laterality Date    CARPAL TUNNEL RELEASE Left 2023    LEFT CARPAL TUNNEL RELEASE performed by Abhishek Gonzales MD at [facility] OR    COLONOSCOPY N/A 2021    COLONOSCOPY DIAGNOSTIC performed by Christian Yanes MD at [facility] ENDOSCOPY    COSMETIC SURGERY      DENTAL SURGERY      tooth extraction    JOINT REPLACEMENT  2022    KNEE ARTHROSCOPY Left      Social History     Tobacco Use    Smoking status: Every Day     Current packs/day: 0.00     Average packs/day: 1 pack/day for 41.0 years (41.0 ttl pk-yrs)     Types: Cigarettes     Start date: 1981     Last attempt to quit: 12/15/2021     Years since quittin.0    Smokeless tobacco: Never   Vaping Use    Vaping Use: Former   Substance Use Topics    Alcohol use: Yes     Alcohol/week: 9.0 standard drinks of alcohol     Types: 6 Cans of

## 2024-01-18 NOTE — H&P
Pre-operative Update of H&P:    I  have seen & examined Mr. Christian Crandall related solely to his hand and upper extremity conditions, prior to the scheduled procedure on the date of his surgery.  The indications for the planned surgical procedure & and his upper-extremity condition are unchanged.

## 2024-01-18 NOTE — PROGRESS NOTES
[facility] Pre-Admission Testing Electronic Communication Worksheet for OR/ENDO Procedures        Patient: Christian Crandall    DOS: 1/18/23    Arrival Time: 11    Surgery Time:1230    Meds to Bed:  [] YES    [x]  NO    Transportation Confirmed: [x] YES    []  NO    History and Physical:  [] YES    []  NO  [] N/A  If yes, please list doctor or Urgent Care and date of H&P: see epic    Additional Clearance(Cardiac, Pulmonary, etc):  [] YES    [x]  NO    Pre-Admission Testing Visit:  [] YES    [x]  NO If no, do labs/testing need to be done DOS?  [] YES    [x]  NO    Medication Reconciliation Complete:  [x] YES    []  NO        Additional Notes:                Interview Complete: [x] YES    []  NO          
 Follow Up Prior to Surgery    DOS: 23  :1965      History and Physical:  Pt will call and make appt with madison Landeros md for HP    UPDATE::PRE-OP H&P BY RADHA SCHMIDT CNP IN EPIC PATIENT  CLEARED FOR SURGERY      
Discharge instructions given to Pt and spouse, all questions answered. Pt discharged to home with wife to transport.   
Pt is alert and oriented and denies pain at this time, vital signs stable on room air. Tolerating a drink and snack.   
surgery.    C-Difficile admission screening and protocol:       * Admitted with diarrhea?                         [] YES    [x]  NO     *Prior history of C-Diff. In last 3 months? [] YES    [x]  NO     *Antibiotic use in the past 6-8 weeks?      [x]  NO    []  YES                 If yes, which ANTIBIOTIC AND REASON______     *Prior hospitalization or nursing home in the last month? []  YES    [x]  NO        SAFETY FIRST..call before you fall

## 2024-01-19 DIAGNOSIS — M25.511 RIGHT SHOULDER PAIN, UNSPECIFIED CHRONICITY: ICD-10-CM

## 2024-01-19 RX ORDER — CYCLOBENZAPRINE HCL 10 MG
TABLET ORAL
Qty: 30 TABLET | Refills: 1 | Status: SHIPPED | OUTPATIENT
Start: 2024-01-19

## 2024-02-29 RX ORDER — HYDROCHLOROTHIAZIDE 25 MG/1
25 TABLET ORAL EVERY MORNING
Qty: 90 TABLET | Refills: 0 | Status: SHIPPED | OUTPATIENT
Start: 2024-02-29

## 2024-02-29 RX ORDER — NIFEDIPINE 60 MG/1
60 TABLET, EXTENDED RELEASE ORAL DAILY
Qty: 90 TABLET | Refills: 0 | Status: SHIPPED | OUTPATIENT
Start: 2024-02-29

## 2024-03-19 DIAGNOSIS — M25.511 RIGHT SHOULDER PAIN, UNSPECIFIED CHRONICITY: ICD-10-CM

## 2024-03-19 RX ORDER — CYCLOBENZAPRINE HCL 10 MG
TABLET ORAL
Qty: 30 TABLET | Refills: 0 | Status: SHIPPED | OUTPATIENT
Start: 2024-03-19

## 2024-03-19 NOTE — TELEPHONE ENCOUNTER
Patient due for a follow up appointment. Patient no showed his appointment on 3-7-2024. LM for patient to call the office and reschedule.

## 2024-03-30 DIAGNOSIS — I10 PRIMARY HYPERTENSION: ICD-10-CM

## 2024-04-01 RX ORDER — VALSARTAN 160 MG/1
160 TABLET ORAL DAILY
Qty: 90 TABLET | Refills: 0 | Status: SHIPPED | OUTPATIENT
Start: 2024-04-01

## 2024-04-04 DIAGNOSIS — F33.1 MODERATE EPISODE OF RECURRENT MAJOR DEPRESSIVE DISORDER (HCC): ICD-10-CM

## 2024-04-05 RX ORDER — VENLAFAXINE HYDROCHLORIDE 75 MG/1
150 CAPSULE, EXTENDED RELEASE ORAL DAILY
Qty: 30 CAPSULE | Refills: 3 | Status: SHIPPED | OUTPATIENT
Start: 2024-04-05

## 2024-04-18 DIAGNOSIS — M25.511 RIGHT SHOULDER PAIN, UNSPECIFIED CHRONICITY: ICD-10-CM

## 2024-04-18 RX ORDER — CYCLOBENZAPRINE HCL 10 MG
TABLET ORAL
Qty: 30 TABLET | Refills: 0 | Status: SHIPPED | OUTPATIENT
Start: 2024-04-18

## 2024-04-18 NOTE — TELEPHONE ENCOUNTER
Patient due for an appointment. Patient no showed last scheduled appointment.   Patient scheduled for 6-3-2024.

## 2024-05-18 DIAGNOSIS — M25.511 RIGHT SHOULDER PAIN, UNSPECIFIED CHRONICITY: ICD-10-CM

## 2024-05-19 DIAGNOSIS — M25.511 RIGHT SHOULDER PAIN, UNSPECIFIED CHRONICITY: ICD-10-CM

## 2024-05-20 RX ORDER — CYCLOBENZAPRINE HCL 10 MG
TABLET ORAL
Qty: 30 TABLET | Refills: 0 | OUTPATIENT
Start: 2024-05-20

## 2024-05-20 RX ORDER — CYCLOBENZAPRINE HCL 10 MG
TABLET ORAL
Qty: 30 TABLET | Refills: 0 | Status: SHIPPED | OUTPATIENT
Start: 2024-05-20

## 2024-05-29 RX ORDER — HYDROCHLOROTHIAZIDE 25 MG/1
25 TABLET ORAL EVERY MORNING
Qty: 90 TABLET | Refills: 0 | Status: SHIPPED | OUTPATIENT
Start: 2024-05-29

## 2024-05-29 RX ORDER — NIFEDIPINE 60 MG/1
60 TABLET, EXTENDED RELEASE ORAL DAILY
Qty: 90 TABLET | Refills: 0 | Status: SHIPPED | OUTPATIENT
Start: 2024-05-29

## 2024-05-31 SDOH — ECONOMIC STABILITY: FOOD INSECURITY: WITHIN THE PAST 12 MONTHS, YOU WORRIED THAT YOUR FOOD WOULD RUN OUT BEFORE YOU GOT MONEY TO BUY MORE.: NEVER TRUE

## 2024-05-31 SDOH — ECONOMIC STABILITY: FOOD INSECURITY: WITHIN THE PAST 12 MONTHS, THE FOOD YOU BOUGHT JUST DIDN'T LAST AND YOU DIDN'T HAVE MONEY TO GET MORE.: NEVER TRUE

## 2024-05-31 SDOH — ECONOMIC STABILITY: INCOME INSECURITY: HOW HARD IS IT FOR YOU TO PAY FOR THE VERY BASICS LIKE FOOD, HOUSING, MEDICAL CARE, AND HEATING?: SOMEWHAT HARD

## 2024-05-31 ASSESSMENT — PATIENT HEALTH QUESTIONNAIRE - PHQ9
7. TROUBLE CONCENTRATING ON THINGS, SUCH AS READING THE NEWSPAPER OR WATCHING TELEVISION: NOT AT ALL
2. FEELING DOWN, DEPRESSED OR HOPELESS: SEVERAL DAYS
9. THOUGHTS THAT YOU WOULD BE BETTER OFF DEAD, OR OF HURTING YOURSELF: NOT AT ALL
1. LITTLE INTEREST OR PLEASURE IN DOING THINGS: NOT AT ALL
10. IF YOU CHECKED OFF ANY PROBLEMS, HOW DIFFICULT HAVE THESE PROBLEMS MADE IT FOR YOU TO DO YOUR WORK, TAKE CARE OF THINGS AT HOME, OR GET ALONG WITH OTHER PEOPLE: SOMEWHAT DIFFICULT
4. FEELING TIRED OR HAVING LITTLE ENERGY: SEVERAL DAYS
6. FEELING BAD ABOUT YOURSELF - OR THAT YOU ARE A FAILURE OR HAVE LET YOURSELF OR YOUR FAMILY DOWN: MORE THAN HALF THE DAYS
SUM OF ALL RESPONSES TO PHQ QUESTIONS 1-9: 6
7. TROUBLE CONCENTRATING ON THINGS, SUCH AS READING THE NEWSPAPER OR WATCHING TELEVISION: NOT AT ALL
SUM OF ALL RESPONSES TO PHQ QUESTIONS 1-9: 6
4. FEELING TIRED OR HAVING LITTLE ENERGY: SEVERAL DAYS
5. POOR APPETITE OR OVEREATING: NOT AT ALL
3. TROUBLE FALLING OR STAYING ASLEEP: MORE THAN HALF THE DAYS
6. FEELING BAD ABOUT YOURSELF - OR THAT YOU ARE A FAILURE OR HAVE LET YOURSELF OR YOUR FAMILY DOWN: MORE THAN HALF THE DAYS
10. IF YOU CHECKED OFF ANY PROBLEMS, HOW DIFFICULT HAVE THESE PROBLEMS MADE IT FOR YOU TO DO YOUR WORK, TAKE CARE OF THINGS AT HOME, OR GET ALONG WITH OTHER PEOPLE: SOMEWHAT DIFFICULT
8. MOVING OR SPEAKING SO SLOWLY THAT OTHER PEOPLE COULD HAVE NOTICED. OR THE OPPOSITE - BEING SO FIDGETY OR RESTLESS THAT YOU HAVE BEEN MOVING AROUND A LOT MORE THAN USUAL: NOT AT ALL
SUM OF ALL RESPONSES TO PHQ QUESTIONS 1-9: 6
SUM OF ALL RESPONSES TO PHQ QUESTIONS 1-9: 6
SUM OF ALL RESPONSES TO PHQ9 QUESTIONS 1 & 2: 1
1. LITTLE INTEREST OR PLEASURE IN DOING THINGS: NOT AT ALL
SUM OF ALL RESPONSES TO PHQ QUESTIONS 1-9: 6
5. POOR APPETITE OR OVEREATING: NOT AT ALL
2. FEELING DOWN, DEPRESSED OR HOPELESS: SEVERAL DAYS
9. THOUGHTS THAT YOU WOULD BE BETTER OFF DEAD, OR OF HURTING YOURSELF: NOT AT ALL
8. MOVING OR SPEAKING SO SLOWLY THAT OTHER PEOPLE COULD HAVE NOTICED. OR THE OPPOSITE, BEING SO FIGETY OR RESTLESS THAT YOU HAVE BEEN MOVING AROUND A LOT MORE THAN USUAL: NOT AT ALL
3. TROUBLE FALLING OR STAYING ASLEEP: MORE THAN HALF THE DAYS

## 2024-06-03 ENCOUNTER — OFFICE VISIT (OUTPATIENT)
Dept: FAMILY MEDICINE CLINIC | Age: 59
End: 2024-06-03
Payer: COMMERCIAL

## 2024-06-03 VITALS
RESPIRATION RATE: 16 BRPM | TEMPERATURE: 98.1 F | HEART RATE: 79 BPM | SYSTOLIC BLOOD PRESSURE: 128 MMHG | BODY MASS INDEX: 30.42 KG/M2 | HEIGHT: 67 IN | DIASTOLIC BLOOD PRESSURE: 72 MMHG | OXYGEN SATURATION: 96 % | WEIGHT: 193.8 LBS

## 2024-06-03 DIAGNOSIS — R73.03 PRE-DIABETES: ICD-10-CM

## 2024-06-03 DIAGNOSIS — M79.671 RIGHT FOOT PAIN: Primary | ICD-10-CM

## 2024-06-03 DIAGNOSIS — F33.1 MODERATE EPISODE OF RECURRENT MAJOR DEPRESSIVE DISORDER (HCC): ICD-10-CM

## 2024-06-03 DIAGNOSIS — D64.9 ANEMIA, UNSPECIFIED TYPE: ICD-10-CM

## 2024-06-03 DIAGNOSIS — Z12.5 PROSTATE CANCER SCREENING: ICD-10-CM

## 2024-06-03 DIAGNOSIS — E78.00 PURE HYPERCHOLESTEROLEMIA: ICD-10-CM

## 2024-06-03 DIAGNOSIS — I10 PRIMARY HYPERTENSION: ICD-10-CM

## 2024-06-03 PROCEDURE — 3078F DIAST BP <80 MM HG: CPT | Performed by: INTERNAL MEDICINE

## 2024-06-03 PROCEDURE — 4004F PT TOBACCO SCREEN RCVD TLK: CPT | Performed by: INTERNAL MEDICINE

## 2024-06-03 PROCEDURE — G8417 CALC BMI ABV UP PARAM F/U: HCPCS | Performed by: INTERNAL MEDICINE

## 2024-06-03 PROCEDURE — G8427 DOCREV CUR MEDS BY ELIG CLIN: HCPCS | Performed by: INTERNAL MEDICINE

## 2024-06-03 PROCEDURE — 3074F SYST BP LT 130 MM HG: CPT | Performed by: INTERNAL MEDICINE

## 2024-06-03 PROCEDURE — 99213 OFFICE O/P EST LOW 20 MIN: CPT | Performed by: INTERNAL MEDICINE

## 2024-06-03 PROCEDURE — 3017F COLORECTAL CA SCREEN DOC REV: CPT | Performed by: INTERNAL MEDICINE

## 2024-06-03 RX ORDER — DICLOFENAC SODIUM 75 MG/1
75 TABLET, DELAYED RELEASE ORAL 2 TIMES DAILY
Qty: 60 TABLET | Refills: 0 | Status: SHIPPED | OUTPATIENT
Start: 2024-06-03

## 2024-06-03 RX ORDER — VENLAFAXINE HYDROCHLORIDE 150 MG/1
150 CAPSULE, EXTENDED RELEASE ORAL DAILY
Qty: 90 CAPSULE | Refills: 1 | Status: SHIPPED | OUTPATIENT
Start: 2024-06-03

## 2024-06-03 NOTE — PROGRESS NOTES
SUBJECTIVE:  Christian Crandall is a 58 y.o. male being evaluated for:    Chief Complaint   Patient presents with    Hypertension    Depression       HPI   Depression is stable   Weight is coming down and watching diet  working on it Just marijuana no other drugs  No narcotics     Pain in his right foot  through the medial lateral side of his foot  Hx of bone spurs  No trauma  Did get new shoes at work Worse with new work shoes  Has tried icy hot and ibuprofen 3 tid   Helped somewhat      No Known Allergies  Current Outpatient Medications   Medication Sig Dispense Refill    diclofenac (VOLTAREN) 75 MG EC tablet Take 1 tablet by mouth 2 times daily With food 60 tablet 0    venlafaxine (EFFEXOR XR) 150 MG extended release capsule Take 1 capsule by mouth daily 90 capsule 1    NIFEdipine (PROCARDIA XL) 60 MG extended release tablet TAKE 1 TABLET BY MOUTH DAILY 90 tablet 0    hydroCHLOROthiazide (HYDRODIURIL) 25 MG tablet TAKE 1 TABLET BY MOUTH EVERY MORNING 90 tablet 0    cyclobenzaprine (FLEXERIL) 10 MG tablet TAKE 1 TABLET BY MOUTH EVERY NIGHT AS NEEDED FOR MUSCLE SPASMS 30 tablet 0    valsartan (DIOVAN) 160 MG tablet TAKE 1 TABLET BY MOUTH DAILY 90 tablet 0    atorvastatin (LIPITOR) 20 MG tablet TAKE 1 TABLET BY MOUTH DAILY 90 tablet 1    hydrOXYzine HCl (ATARAX) 25 MG tablet TAKE 1 TABLET BY MOUTH TWICE DAILY AS NEEDED FOR ANXIETY OR SLEEP 60 tablet 2    Biotin 1 MG CAPS Take by mouth      B Complex Vitamins (VITAMIN B COMPLEX PO) Take 1 tablet by mouth daily      Glucosamine-Chondroitin (GLUCOSAMINE CHONDR COMPLEX PO) Take by mouth Take 2-3 by mouth every morning      dicyclomine (BENTYL) 10 MG capsule Take 1 capsule by mouth 3 times daily as needed (BLOATING) 270 capsule 0    gabapentin (NEURONTIN) 300 MG capsule Take 1 capsule by mouth in the morning and at bedtime for 90 days. Max Daily Amount: 600 mg (Patient taking differently: Take 1 capsule by mouth once.) 60 capsule 2     No current facility-administered

## 2024-06-03 NOTE — PATIENT INSTRUCTIONS
application instructions  Website: [email or web address]  RENT / UTILITIES AND FINANCIAL ASSISTANCE PROGRAMS    [facility]  [employer]  What they offer:  Food, rent and utility assistance     Phone Number: [phone]  Address: [address] [address]   Website: [email or web address]   What they offer:  Rent and utility assistance      Phone Number: [phone]  Address: [address] [address], [address]   Website: [email or web address]://[email or web address]     [employer] ([employer])  What they offer:  Utility assistance with heating bills       Phone Number: [phone]  Address: [address]   Website: [email or web address]    [facility]  What they offer:  Rent, utility assistance and financial assistance   Phone Number: [phone]  Address: [address] [place] [address]   Website: [email or web address]     [employer]  What they offer:  Utility assistance      Phone Number: [phone]  Address: [address], [address]   Website: http://www.[employer].org/      [facility]  [employer])  What they offer:  Food, rent and utility assistance     Phone Number: [phone]  Address: [address], [address]   Website: https://selfhelps.org/     [employer] [facility]  What they offer:  Food, rent and utility assistance     Phone Number: [phone]  Address: [address] [address]   Website: http://www.[employer].Cubie/     [employer]  What they offer: Rent and utility assistance     Phone Number:   [place]: [phone]  [place]: [phone]  Address:  [place]: [address] [address]  [place]: [address] [facility] [address]    Website: http://[email or web address]/    [facility]   What they offer:  Food, rent and

## 2024-06-04 ENCOUNTER — HOSPITAL ENCOUNTER (OUTPATIENT)
Age: 59
Discharge: HOME | End: 2024-06-04
Payer: COMMERCIAL

## 2024-06-04 ENCOUNTER — HOSPITAL ENCOUNTER (OUTPATIENT)
Dept: GENERAL RADIOLOGY | Age: 59
Discharge: HOME | End: 2024-06-04
Payer: COMMERCIAL

## 2024-06-04 DIAGNOSIS — M79.671 RIGHT FOOT PAIN: ICD-10-CM

## 2024-06-04 PROCEDURE — 73630 X-RAY EXAM OF FOOT: CPT

## 2024-06-06 ASSESSMENT — ENCOUNTER SYMPTOMS
CONSTIPATION: 0
ABDOMINAL PAIN: 0
SHORTNESS OF BREATH: 0
VOMITING: 0
NAUSEA: 0
COUGH: 0
BLOOD IN STOOL: 0
DIARRHEA: 0

## 2024-06-17 DIAGNOSIS — M25.511 RIGHT SHOULDER PAIN, UNSPECIFIED CHRONICITY: ICD-10-CM

## 2024-06-17 RX ORDER — CYCLOBENZAPRINE HCL 10 MG
TABLET ORAL
Qty: 30 TABLET | Refills: 0 | Status: SHIPPED | OUTPATIENT
Start: 2024-06-17

## 2024-06-28 DIAGNOSIS — I10 PRIMARY HYPERTENSION: ICD-10-CM

## 2024-06-28 RX ORDER — VALSARTAN 160 MG/1
160 TABLET ORAL DAILY
Qty: 90 TABLET | Refills: 0 | Status: SHIPPED | OUTPATIENT
Start: 2024-06-28

## 2024-07-12 DIAGNOSIS — E78.00 PURE HYPERCHOLESTEROLEMIA: ICD-10-CM

## 2024-07-13 DIAGNOSIS — E78.00 PURE HYPERCHOLESTEROLEMIA: ICD-10-CM

## 2024-07-15 RX ORDER — ATORVASTATIN CALCIUM 20 MG/1
20 TABLET, FILM COATED ORAL DAILY
Qty: 30 TABLET | OUTPATIENT
Start: 2024-07-15

## 2024-07-15 RX ORDER — ATORVASTATIN CALCIUM 20 MG/1
20 TABLET, FILM COATED ORAL DAILY
Qty: 90 TABLET | Refills: 0 | Status: SHIPPED | OUTPATIENT
Start: 2024-07-15

## 2024-07-17 DIAGNOSIS — M25.511 RIGHT SHOULDER PAIN, UNSPECIFIED CHRONICITY: ICD-10-CM

## 2024-07-17 RX ORDER — CYCLOBENZAPRINE HCL 10 MG
TABLET ORAL
Qty: 30 TABLET | Refills: 2 | Status: SHIPPED | OUTPATIENT
Start: 2024-07-17

## 2024-08-29 RX ORDER — HYDROCHLOROTHIAZIDE 25 MG/1
25 TABLET ORAL EVERY MORNING
Qty: 90 TABLET | Refills: 0 | Status: SHIPPED | OUTPATIENT
Start: 2024-08-29

## 2024-08-29 RX ORDER — NIFEDIPINE 60 MG/1
60 TABLET, EXTENDED RELEASE ORAL DAILY
Qty: 90 TABLET | Refills: 0 | Status: SHIPPED | OUTPATIENT
Start: 2024-08-29

## 2024-09-28 DIAGNOSIS — I10 PRIMARY HYPERTENSION: ICD-10-CM

## 2024-09-30 RX ORDER — VALSARTAN 160 MG/1
160 TABLET ORAL DAILY
Qty: 90 TABLET | Refills: 0 | Status: SHIPPED | OUTPATIENT
Start: 2024-09-30

## 2024-10-31 NOTE — PROGRESS NOTES
Jocelyn Cunningham (:  1965) is a 59 y.o. male,Established patient, here for evaluation of the following chief complaint(s):  Follow-up (Pt was in ER on 23 and yesterday at work he fainted and lost consciousness. Pt did not hit his head and feels fine. Needs a work note to return to work tomorrow for passing out yesterday. )         ASSESSMENT/PLAN:  1. Accidental overdose, subsequent encounter  Resolved. Encourage pt to not use street drugs. Pt decline rehab or treatment center numbers. 2. Primary hypertension  Not controlled. Pt states his BP is elevated due to recent events. Follow pt education included. Return if symptoms worsen or fail to improve. Subjective   SUBJECTIVE/OBJECTIVE:  HPI  Presents today for note to return to work. States he made a mistake and took too much fentyl on accident. States he normally does not do fentanyl but was very stressed with life stressors and decided to use. Needs work note to go back to work tomorrow. States he does not need referral for rehab, states he does not have addictive personality and can choose to not use. Reports he is working on his life stressors,  getting a different job to be closer to his daughter. Pt denies injury from yesterday. PO intake is good. Denies concerns or complaints. HTN-states he takes his lisinopril daily. Reports BP is elveated today with recent events. Denies special diets or exercise. Denies HA, CP, dizziness, syncope, sob, leg swelling or weakness. Current Outpatient Medications   Medication Sig Dispense Refill    hydrOXYzine HCl (ATARAX) 25 MG tablet TAKE 1 TABLET BY MOUTH TWICE DAILY AS NEEDED FOR ANXIETY OR SLEEP 60 tablet 2    gabapentin (NEURONTIN) 300 MG capsule Take 1 capsule by mouth 2 times daily for 60 days.  Max Daily Amount: 600 mg 60 capsule 1    rivaroxaban (XARELTO) 20 MG TABS tablet Take 1 tablet by mouth daily (with breakfast) 30 tablet 5    venlafaxine (EFFEXOR XR) 75 MG extended release The patient's goals for the shift include      The clinical goals for the shift include Patient will tolerate room air this shift    Patient was admitted from ED this shift. Arrived with mom. Patient has been tachycardic and tachypnic this shift. Remains on room air. Mild belly breathing. Tolerating regular diet. Mom remains at bedside and active in care. Plan of care ongoing.      capsule Take 2 capsules by mouth daily (Patient taking differently: Take 75 mg by mouth daily) 30 capsule 3    lisinopril (PRINIVIL;ZESTRIL) 10 MG tablet Take 1 tablet by mouth daily 90 tablet 1    atorvastatin (LIPITOR) 20 MG tablet Take 1 tablet by mouth daily 90 tablet 1    dicyclomine (BENTYL) 10 MG capsule TAKE ONE CAPSULE BY MOUTH THREE TIMES A DAY AS NEEDED FOR ABDOMINAL BLOATING 90 capsule 3    venlafaxine (EFFEXOR XR) 150 MG extended release capsule TAKE ONE CAPSULE BY MOUTH DAILY (Patient taking differently: Take 225 mg by mouth daily) 90 capsule 1    Biotin 1 MG CAPS Take by mouth      B Complex Vitamins (VITAMIN B COMPLEX PO) Take 1 tablet by mouth daily      Glucosamine-Chondroitin (GLUCOSAMINE CHONDR COMPLEX PO) Take by mouth Take 2-3 by mouth every morning       No current facility-administered medications for this visit. Past Medical History:   Diagnosis Date    Anxiety     Depression     Drug abuse ([facility] [address]     DVT (deep venous thrombosis) ([facility])     Hyperlipidemia     Hypertension     borderline     Osteoarthritis     knees    Torn ACL 2003    left, right    Torn meniscus     left, right        Review of Systems   Constitutional:  Negative for activity change, fatigue and fever. HENT:  Negative for congestion. Respiratory:  Negative for cough and shortness of breath. Cardiovascular:  Negative for chest pain, palpitations and leg swelling. Gastrointestinal:  Negative for abdominal pain. Musculoskeletal: Negative. Neurological:  Negative for dizziness, weakness, light-headedness and headaches. Objective   Physical Exam  Constitutional:       Appearance: He is obese. HENT:      Head: Normocephalic and atraumatic. Eyes:      Extraocular Movements: Extraocular movements intact. Conjunctiva/sclera: Conjunctivae normal.      Pupils: Pupils are equal, round, and reactive to light. Neck:      Vascular: No carotid bruit.    Cardiovascular:      Rate and Rhythm: Normal rate and regular rhythm. Heart sounds: Normal heart sounds. Pulmonary:      Effort: Pulmonary effort is normal.      Breath sounds: Normal breath sounds. Abdominal:      General: Bowel sounds are normal.      Palpations: Abdomen is soft. Tenderness: There is no abdominal tenderness. Musculoskeletal:      Right lower leg: No edema. Left lower leg: No edema. Skin:     General: Skin is warm and dry. Neurological:      Mental Status: He is alert and oriented to person, place, and time.             --ROXANA Martin - NP

## 2024-11-08 DIAGNOSIS — E78.00 PURE HYPERCHOLESTEROLEMIA: ICD-10-CM

## 2024-11-08 RX ORDER — ATORVASTATIN CALCIUM 20 MG/1
20 TABLET, FILM COATED ORAL DAILY
Qty: 90 TABLET | Refills: 0 | Status: SHIPPED | OUTPATIENT
Start: 2024-11-08

## 2024-12-02 RX ORDER — VENLAFAXINE HYDROCHLORIDE 150 MG/1
150 CAPSULE, EXTENDED RELEASE ORAL DAILY
Qty: 90 CAPSULE | Refills: 1 | Status: SHIPPED | OUTPATIENT
Start: 2024-12-02

## 2024-12-27 DIAGNOSIS — E78.00 PURE HYPERCHOLESTEROLEMIA: ICD-10-CM

## 2024-12-27 RX ORDER — ATORVASTATIN CALCIUM 20 MG/1
20 TABLET, FILM COATED ORAL DAILY
Qty: 90 TABLET | Refills: 0 | Status: SHIPPED | OUTPATIENT
Start: 2024-12-27

## 2025-01-07 RX ORDER — NIFEDIPINE 60 MG/1
60 TABLET, EXTENDED RELEASE ORAL DAILY
Qty: 90 TABLET | Refills: 0 | Status: SHIPPED | OUTPATIENT
Start: 2025-01-07

## 2025-02-01 DIAGNOSIS — I10 PRIMARY HYPERTENSION: ICD-10-CM

## 2025-02-03 RX ORDER — VALSARTAN 160 MG/1
160 TABLET ORAL DAILY
Qty: 90 TABLET | Refills: 0 | Status: SHIPPED | OUTPATIENT
Start: 2025-02-03

## 2025-02-13 ENCOUNTER — OFFICE VISIT (OUTPATIENT)
Dept: FAMILY MEDICINE CLINIC | Age: 60
End: 2025-02-13
Payer: COMMERCIAL

## 2025-02-13 VITALS
DIASTOLIC BLOOD PRESSURE: 72 MMHG | SYSTOLIC BLOOD PRESSURE: 138 MMHG | WEIGHT: 209 LBS | RESPIRATION RATE: 16 BRPM | OXYGEN SATURATION: 98 % | TEMPERATURE: 98.2 F | HEIGHT: 67 IN | HEART RATE: 78 BPM | BODY MASS INDEX: 32.8 KG/M2

## 2025-02-13 DIAGNOSIS — E78.00 PURE HYPERCHOLESTEROLEMIA: ICD-10-CM

## 2025-02-13 DIAGNOSIS — I10 PRIMARY HYPERTENSION: ICD-10-CM

## 2025-02-13 DIAGNOSIS — Z87.891 PERSONAL HISTORY OF TOBACCO USE: ICD-10-CM

## 2025-02-13 DIAGNOSIS — K58.9 IRRITABLE BOWEL SYNDROME, UNSPECIFIED TYPE: ICD-10-CM

## 2025-02-13 DIAGNOSIS — M17.11 PRIMARY OSTEOARTHRITIS OF RIGHT KNEE: ICD-10-CM

## 2025-02-13 DIAGNOSIS — Z12.5 PROSTATE CANCER SCREENING: ICD-10-CM

## 2025-02-13 DIAGNOSIS — Z00.00 PHYSICAL EXAM: Primary | ICD-10-CM

## 2025-02-13 DIAGNOSIS — Z51.81 MEDICATION MONITORING ENCOUNTER: ICD-10-CM

## 2025-02-13 DIAGNOSIS — R73.03 PRE-DIABETES: ICD-10-CM

## 2025-02-13 PROCEDURE — 3075F SYST BP GE 130 - 139MM HG: CPT | Performed by: INTERNAL MEDICINE

## 2025-02-13 PROCEDURE — 99396 PREV VISIT EST AGE 40-64: CPT | Performed by: INTERNAL MEDICINE

## 2025-02-13 PROCEDURE — G0296 VISIT TO DETERM LDCT ELIG: HCPCS | Performed by: INTERNAL MEDICINE

## 2025-02-13 PROCEDURE — 3078F DIAST BP <80 MM HG: CPT | Performed by: INTERNAL MEDICINE

## 2025-02-13 RX ORDER — DICYCLOMINE HYDROCHLORIDE 10 MG/1
10 CAPSULE ORAL 3 TIMES DAILY PRN
Qty: 270 CAPSULE | Refills: 0 | Status: SHIPPED | OUTPATIENT
Start: 2025-02-13

## 2025-02-13 RX ORDER — SENNOSIDES 8.6 MG
1300 CAPSULE ORAL
COMMUNITY

## 2025-02-13 RX ORDER — MULTIVITAMIN WITH IRON
1 TABLET ORAL DAILY
COMMUNITY

## 2025-02-13 RX ORDER — IBUPROFEN 200 MG
600 TABLET ORAL 3 TIMES DAILY
COMMUNITY
End: 2025-02-13 | Stop reason: ALTCHOICE

## 2025-02-13 RX ORDER — HYDROCHLOROTHIAZIDE 25 MG/1
25 TABLET ORAL EVERY MORNING
Qty: 90 TABLET | Refills: 1 | Status: SHIPPED | OUTPATIENT
Start: 2025-02-13

## 2025-02-13 RX ORDER — DICLOFENAC SODIUM 75 MG/1
75 TABLET, DELAYED RELEASE ORAL 2 TIMES DAILY
Qty: 60 TABLET | Refills: 2 | Status: SHIPPED | OUTPATIENT
Start: 2025-02-13

## 2025-02-13 SDOH — ECONOMIC STABILITY: FOOD INSECURITY: WITHIN THE PAST 12 MONTHS, THE FOOD YOU BOUGHT JUST DIDN'T LAST AND YOU DIDN'T HAVE MONEY TO GET MORE.: NEVER TRUE

## 2025-02-13 SDOH — ECONOMIC STABILITY: FOOD INSECURITY: WITHIN THE PAST 12 MONTHS, YOU WORRIED THAT YOUR FOOD WOULD RUN OUT BEFORE YOU GOT MONEY TO BUY MORE.: NEVER TRUE

## 2025-02-13 SDOH — ECONOMIC STABILITY: INCOME INSECURITY: IN THE LAST 12 MONTHS, WAS THERE A TIME WHEN YOU WERE NOT ABLE TO PAY THE MORTGAGE OR RENT ON TIME?: NO

## 2025-02-13 ASSESSMENT — PATIENT HEALTH QUESTIONNAIRE - PHQ9
SUM OF ALL RESPONSES TO PHQ9 QUESTIONS 1 & 2: 0
8. MOVING OR SPEAKING SO SLOWLY THAT OTHER PEOPLE COULD HAVE NOTICED. OR THE OPPOSITE, BEING SO FIGETY OR RESTLESS THAT YOU HAVE BEEN MOVING AROUND A LOT MORE THAN USUAL: NOT AT ALL
6. FEELING BAD ABOUT YOURSELF - OR THAT YOU ARE A FAILURE OR HAVE LET YOURSELF OR YOUR FAMILY DOWN: NOT AT ALL
8. MOVING OR SPEAKING SO SLOWLY THAT OTHER PEOPLE COULD HAVE NOTICED. OR THE OPPOSITE - BEING SO FIDGETY OR RESTLESS THAT YOU HAVE BEEN MOVING AROUND A LOT MORE THAN USUAL: NOT AT ALL
2. FEELING DOWN, DEPRESSED OR HOPELESS: NOT AT ALL
2. FEELING DOWN, DEPRESSED OR HOPELESS: NOT AT ALL
SUM OF ALL RESPONSES TO PHQ QUESTIONS 1-9: 1
SUM OF ALL RESPONSES TO PHQ QUESTIONS 1-9: 1
3. TROUBLE FALLING OR STAYING ASLEEP: SEVERAL DAYS
SUM OF ALL RESPONSES TO PHQ QUESTIONS 1-9: 1
5. POOR APPETITE OR OVEREATING: NOT AT ALL
10. IF YOU CHECKED OFF ANY PROBLEMS, HOW DIFFICULT HAVE THESE PROBLEMS MADE IT FOR YOU TO DO YOUR WORK, TAKE CARE OF THINGS AT HOME, OR GET ALONG WITH OTHER PEOPLE: NOT DIFFICULT AT ALL
6. FEELING BAD ABOUT YOURSELF - OR THAT YOU ARE A FAILURE OR HAVE LET YOURSELF OR YOUR FAMILY DOWN: NOT AT ALL
10. IF YOU CHECKED OFF ANY PROBLEMS, HOW DIFFICULT HAVE THESE PROBLEMS MADE IT FOR YOU TO DO YOUR WORK, TAKE CARE OF THINGS AT HOME, OR GET ALONG WITH OTHER PEOPLE: NOT DIFFICULT AT ALL
7. TROUBLE CONCENTRATING ON THINGS, SUCH AS READING THE NEWSPAPER OR WATCHING TELEVISION: NOT AT ALL
SUM OF ALL RESPONSES TO PHQ QUESTIONS 1-9: 1
3. TROUBLE FALLING OR STAYING ASLEEP: SEVERAL DAYS
9. THOUGHTS THAT YOU WOULD BE BETTER OFF DEAD, OR OF HURTING YOURSELF: NOT AT ALL
SUM OF ALL RESPONSES TO PHQ QUESTIONS 1-9: 1
4. FEELING TIRED OR HAVING LITTLE ENERGY: NOT AT ALL
1. LITTLE INTEREST OR PLEASURE IN DOING THINGS: NOT AT ALL
1. LITTLE INTEREST OR PLEASURE IN DOING THINGS: NOT AT ALL
7. TROUBLE CONCENTRATING ON THINGS, SUCH AS READING THE NEWSPAPER OR WATCHING TELEVISION: NOT AT ALL
9. THOUGHTS THAT YOU WOULD BE BETTER OFF DEAD, OR OF HURTING YOURSELF: NOT AT ALL
5. POOR APPETITE OR OVEREATING: NOT AT ALL
4. FEELING TIRED OR HAVING LITTLE ENERGY: NOT AT ALL

## 2025-02-13 ASSESSMENT — ENCOUNTER SYMPTOMS
ABDOMINAL PAIN: 0
CONSTIPATION: 0
SHORTNESS OF BREATH: 0
BLOOD IN STOOL: 0
ROS SKIN COMMENTS: NO CONCERNING SKIN LESION
DIARRHEA: 0
SINUS PRESSURE: 0
VOMITING: 0
TROUBLE SWALLOWING: 0
NAUSEA: 0
BACK PAIN: 1
COUGH: 0

## 2025-02-13 NOTE — PATIENT INSTRUCTIONS
recommend medicines or other ways to help.  Follow-up care is a key part of your treatment and safety. Be sure to make and go to all appointments, and call your doctor if you are having problems. It's also a good idea to know your test results and keep a list of the medicines you take.  Where can you learn more?  Go to https://www.Equipboard.net/patientEd and enter Q940 to learn more about \"Learning About Lung Cancer Screening.\"  Current as of: October 25, 2023  Content Version: 14.3  © 2024 [employer].   Care instructions adapted under license by [employer]. If you have questions about a medical condition or this instruction, always ask your healthcare professional. for; to (do) Centers, [employer] disclaims any warranty or liability for your use of this information.

## 2025-02-13 NOTE — PROGRESS NOTES
part of your treatment and safety. Be sure to make and go to all appointments, and call your doctor if you are having problems. It's also a good idea to know your test results and keep a list of the medicines you take.  Where can you learn more?  Go to https://www.[employer].net/patientEd and enter Q940 to learn more about \"Learning About Lung Cancer Screening.\"  Current as of: October 25, 2023  Content Version: 14.3  © 2024 [employer].   Care instructions adapted under license by [employer]. If you have questions about a medical condition or this instruction, always ask your healthcare professional. [employer], [employer], disclaims any warranty or liability for your use of this information.        Discussed with the patient the current USPSTF guidelines released March 9, 2021 for screening for lung cancer.    For adults aged 50 to 80 years who have a 20 pack-year smoking history and currently smoke or have quit within the past 15 years the grade B recommendation is to:  Screen for lung cancer with low-dose computed tomography (LDCT) every year.  Stop screening once a person has not smoked for 15 years or has a health problem that limits life expectancy or the ability to have lung surgery.    The patient  reports that he has been smoking cigarettes. He started smoking about 44 years ago. He has a 41 pack-year smoking history. He has never used smokeless tobacco.. Discussed with patient the risks and benefits of screening, including over-diagnosis, false positive rate, and total radiation exposure.  The patient currently exhibits no signs or symptoms suggestive of lung cancer.  Discussed with patient the importance of compliance with yearly annual lung cancer screenings and willingness to undergo diagnosis and treatment if screening scan is positive.  In addition, the patient was counseled regarding the importance of remaining smoke free and/or total smoking cessation.    Also reviewed the following if

## 2025-02-27 ENCOUNTER — HOSPITAL ENCOUNTER (OUTPATIENT)
Dept: CT IMAGING | Age: 60
Discharge: HOME | End: 2025-02-27
Attending: INTERNAL MEDICINE
Payer: COMMERCIAL

## 2025-02-27 DIAGNOSIS — Z87.891 PERSONAL HISTORY OF TOBACCO USE: ICD-10-CM

## 2025-02-27 PROCEDURE — 71271 CT THORAX LUNG CANCER SCR C-: CPT

## 2025-03-27 DIAGNOSIS — R73.03 PRE-DIABETES: ICD-10-CM

## 2025-03-27 DIAGNOSIS — E78.00 PURE HYPERCHOLESTEROLEMIA: ICD-10-CM

## 2025-03-27 DIAGNOSIS — M17.11 PRIMARY OSTEOARTHRITIS OF RIGHT KNEE: ICD-10-CM

## 2025-03-27 DIAGNOSIS — I10 PRIMARY HYPERTENSION: ICD-10-CM

## 2025-03-27 LAB
ALBUMIN SERPL-MCNC: 4.3 G/DL (ref 3.4–5)
ALBUMIN/GLOB SERPL: 1.8 {RATIO} (ref 1.1–2.2)
ALP SERPL-CCNC: 119 U/L (ref 40–129)
ALT SERPL-CCNC: 24 U/L (ref 10–40)
ANION GAP SERPL CALCULATED.3IONS-SCNC: 11 MMOL/L (ref 3–16)
AST SERPL-CCNC: 22 U/L (ref 15–37)
BASOPHILS # BLD: 0 K/UL (ref 0–0.2)
BASOPHILS NFR BLD: 0.7 %
BILIRUB SERPL-MCNC: 0.4 MG/DL (ref 0–1)
BUN SERPL-MCNC: 19 MG/DL (ref 7–20)
CALCIUM SERPL-MCNC: 9.5 MG/DL (ref 8.3–10.6)
CHLORIDE SERPL-SCNC: 102 MMOL/L (ref 99–110)
CHOLEST SERPL-MCNC: 174 MG/DL (ref 0–199)
CO2 SERPL-SCNC: 28 MMOL/L (ref 21–32)
CREAT SERPL-MCNC: 0.8 MG/DL (ref 0.8–1.3)
DEPRECATED RDW RBC AUTO: 15.2 % (ref 12.4–15.4)
EOSINOPHIL # BLD: 0.3 K/UL (ref 0–0.6)
EOSINOPHIL NFR BLD: 5.3 %
GFR SERPLBLD CREATININE-BSD FMLA CKD-EPI: >90 ML/MIN/{1.73_M2}
GLUCOSE SERPL-MCNC: 98 MG/DL (ref 70–99)
HCT VFR BLD AUTO: 39.6 % (ref 40.5–52.5)
HDLC SERPL-MCNC: 57 MG/DL (ref 40–60)
HGB BLD-MCNC: 13.2 G/DL (ref 13.5–17.5)
LDLC SERPL CALC-MCNC: 97 MG/DL
LYMPHOCYTES # BLD: 1.4 K/UL (ref 1–5.1)
LYMPHOCYTES NFR BLD: 24.5 %
MCH RBC QN AUTO: 30.6 PG (ref 26–34)
MCHC RBC AUTO-ENTMCNC: 33.3 G/DL (ref 31–36)
MCV RBC AUTO: 91.7 FL (ref 80–100)
MONOCYTES # BLD: 0.4 K/UL (ref 0–1.3)
MONOCYTES NFR BLD: 7.6 %
NEUTROPHILS # BLD: 3.6 K/UL (ref 1.7–7.7)
NEUTROPHILS NFR BLD: 61.9 %
PLATELET # BLD AUTO: 316 K/UL (ref 135–450)
PMV BLD AUTO: 7.7 FL (ref 5–10.5)
POTASSIUM SERPL-SCNC: 4.2 MMOL/L (ref 3.5–5.1)
PROT SERPL-MCNC: 6.7 G/DL (ref 6.4–8.2)
RBC # BLD AUTO: 4.32 M/UL (ref 4.2–5.9)
SODIUM SERPL-SCNC: 141 MMOL/L (ref 136–145)
TRIGL SERPL-MCNC: 99 MG/DL (ref 0–150)
VLDLC SERPL CALC-MCNC: 20 MG/DL
WBC # BLD AUTO: 5.8 K/UL (ref 4–11)

## 2025-03-28 LAB
EST. AVERAGE GLUCOSE BLD GHB EST-MCNC: 125.5 MG/DL
HBA1C MFR BLD: 6 %

## 2025-03-30 ENCOUNTER — RESULTS FOLLOW-UP (OUTPATIENT)
Dept: FAMILY MEDICINE CLINIC | Age: 60
End: 2025-03-30

## 2025-03-31 ENCOUNTER — RESULTS FOLLOW-UP (OUTPATIENT)
Dept: FAMILY MEDICINE CLINIC | Age: 60
End: 2025-03-31

## 2025-03-31 DIAGNOSIS — D64.9 ANEMIA, UNSPECIFIED TYPE: Primary | ICD-10-CM

## 2025-03-31 DIAGNOSIS — D64.9 ANEMIA, UNSPECIFIED TYPE: ICD-10-CM

## 2025-03-31 LAB
FERRITIN SERPL IA-MCNC: 200 NG/ML (ref 30–400)
IRON SATN MFR SERPL: 26 % (ref 20–50)
IRON SERPL-MCNC: 72 UG/DL (ref 59–158)
TIBC SERPL-MCNC: 277 UG/DL (ref 260–445)

## 2025-04-02 DIAGNOSIS — E78.00 PURE HYPERCHOLESTEROLEMIA: ICD-10-CM

## 2025-04-02 RX ORDER — ATORVASTATIN CALCIUM 20 MG/1
20 TABLET, FILM COATED ORAL DAILY
Qty: 90 TABLET | Refills: 0 | Status: SHIPPED | OUTPATIENT
Start: 2025-04-02

## 2025-04-28 RX ORDER — NIFEDIPINE 60 MG/1
60 TABLET, EXTENDED RELEASE ORAL DAILY
Qty: 90 TABLET | Refills: 0 | Status: SHIPPED | OUTPATIENT
Start: 2025-04-28

## 2025-05-02 DIAGNOSIS — I10 PRIMARY HYPERTENSION: ICD-10-CM

## 2025-05-02 RX ORDER — VALSARTAN 160 MG/1
160 TABLET ORAL DAILY
Qty: 90 TABLET | Refills: 0 | Status: SHIPPED | OUTPATIENT
Start: 2025-05-02

## 2025-07-02 DIAGNOSIS — E78.00 PURE HYPERCHOLESTEROLEMIA: ICD-10-CM

## 2025-07-02 RX ORDER — VENLAFAXINE HYDROCHLORIDE 150 MG/1
150 CAPSULE, EXTENDED RELEASE ORAL DAILY
Qty: 90 CAPSULE | Refills: 0 | Status: SHIPPED | OUTPATIENT
Start: 2025-07-02

## 2025-07-02 RX ORDER — ATORVASTATIN CALCIUM 20 MG/1
20 TABLET, FILM COATED ORAL DAILY
Qty: 90 TABLET | Refills: 0 | Status: SHIPPED | OUTPATIENT
Start: 2025-07-02

## 2025-08-02 DIAGNOSIS — I10 PRIMARY HYPERTENSION: ICD-10-CM

## 2025-08-04 RX ORDER — VALSARTAN 160 MG/1
160 TABLET ORAL DAILY
Qty: 90 TABLET | Refills: 0 | Status: SHIPPED | OUTPATIENT
Start: 2025-08-04

## 2025-08-14 ENCOUNTER — OFFICE VISIT (OUTPATIENT)
Dept: FAMILY MEDICINE CLINIC | Age: 60
End: 2025-08-14
Payer: COMMERCIAL

## 2025-08-14 VITALS
SYSTOLIC BLOOD PRESSURE: 138 MMHG | HEART RATE: 64 BPM | TEMPERATURE: 97.7 F | WEIGHT: 206 LBS | DIASTOLIC BLOOD PRESSURE: 80 MMHG | RESPIRATION RATE: 16 BRPM | BODY MASS INDEX: 32.26 KG/M2 | OXYGEN SATURATION: 98 %

## 2025-08-14 DIAGNOSIS — I10 PRIMARY HYPERTENSION: ICD-10-CM

## 2025-08-14 DIAGNOSIS — M15.0 PRIMARY OSTEOARTHRITIS INVOLVING MULTIPLE JOINTS: ICD-10-CM

## 2025-08-14 DIAGNOSIS — R63.5 WEIGHT GAIN: ICD-10-CM

## 2025-08-14 DIAGNOSIS — Z51.81 MEDICATION MONITORING ENCOUNTER: ICD-10-CM

## 2025-08-14 DIAGNOSIS — E78.00 PURE HYPERCHOLESTEROLEMIA: Primary | ICD-10-CM

## 2025-08-14 DIAGNOSIS — R73.03 PRE-DIABETES: ICD-10-CM

## 2025-08-14 PROCEDURE — 99213 OFFICE O/P EST LOW 20 MIN: CPT | Performed by: INTERNAL MEDICINE

## 2025-08-14 PROCEDURE — 3075F SYST BP GE 130 - 139MM HG: CPT | Performed by: INTERNAL MEDICINE

## 2025-08-14 PROCEDURE — 3079F DIAST BP 80-89 MM HG: CPT | Performed by: INTERNAL MEDICINE

## 2025-08-14 RX ORDER — HYDROCHLOROTHIAZIDE 25 MG/1
25 TABLET ORAL EVERY MORNING
Qty: 90 TABLET | Refills: 1 | Status: SHIPPED | OUTPATIENT
Start: 2025-08-14

## 2025-08-14 RX ORDER — SENNOSIDES 8.6 MG
1300 CAPSULE ORAL
COMMUNITY
Start: 2025-08-14

## 2025-08-14 RX ORDER — NIFEDIPINE 30 MG/1
30 TABLET, EXTENDED RELEASE ORAL DAILY
Qty: 90 TABLET | Refills: 1 | Status: SHIPPED | OUTPATIENT
Start: 2025-08-14

## 2025-08-14 ASSESSMENT — PATIENT HEALTH QUESTIONNAIRE - PHQ9
5. POOR APPETITE OR OVEREATING: NOT AT ALL
6. FEELING BAD ABOUT YOURSELF - OR THAT YOU ARE A FAILURE OR HAVE LET YOURSELF OR YOUR FAMILY DOWN: SEVERAL DAYS
2. FEELING DOWN, DEPRESSED OR HOPELESS: MORE THAN HALF THE DAYS
1. LITTLE INTEREST OR PLEASURE IN DOING THINGS: NOT AT ALL
8. MOVING OR SPEAKING SO SLOWLY THAT OTHER PEOPLE COULD HAVE NOTICED. OR THE OPPOSITE, BEING SO FIGETY OR RESTLESS THAT YOU HAVE BEEN MOVING AROUND A LOT MORE THAN USUAL: NOT AT ALL
3. TROUBLE FALLING OR STAYING ASLEEP: SEVERAL DAYS
SUM OF ALL RESPONSES TO PHQ QUESTIONS 1-9: 4
7. TROUBLE CONCENTRATING ON THINGS, SUCH AS READING THE NEWSPAPER OR WATCHING TELEVISION: NOT AT ALL
SUM OF ALL RESPONSES TO PHQ QUESTIONS 1-9: 4
4. FEELING TIRED OR HAVING LITTLE ENERGY: NOT AT ALL
9. THOUGHTS THAT YOU WOULD BE BETTER OFF DEAD, OR OF HURTING YOURSELF: NOT AT ALL
10. IF YOU CHECKED OFF ANY PROBLEMS, HOW DIFFICULT HAVE THESE PROBLEMS MADE IT FOR YOU TO DO YOUR WORK, TAKE CARE OF THINGS AT HOME, OR GET ALONG WITH OTHER PEOPLE: NOT DIFFICULT AT ALL
SUM OF ALL RESPONSES TO PHQ QUESTIONS 1-9: 4
SUM OF ALL RESPONSES TO PHQ QUESTIONS 1-9: 4

## (undated) DEVICE — SOLUTION IV IRRIG 250ML ST LF 0.9% SODIUM 2F7122

## (undated) DEVICE — COVER LT HNDL BLU PLAS

## (undated) DEVICE — SUTURE CHROMIC GUT SZ 4-0 L27IN ABSRB BRN FS-2 L19MM 3/8 635H

## (undated) DEVICE — WRAP COHESIVE W2INXL5YD TAN SELF ADH BNDG HND NON STERILE TEAR CARING

## (undated) DEVICE — WILLIS PACK: Brand: MEDLINE INDUSTRIES, INC.